# Patient Record
Sex: MALE | Race: WHITE | NOT HISPANIC OR LATINO | Employment: OTHER | URBAN - METROPOLITAN AREA
[De-identification: names, ages, dates, MRNs, and addresses within clinical notes are randomized per-mention and may not be internally consistent; named-entity substitution may affect disease eponyms.]

---

## 2017-10-30 ENCOUNTER — OFFICE VISIT (OUTPATIENT)
Dept: URGENT CARE | Facility: RETAIL CLINIC | Age: 62
End: 2017-10-30
Payer: COMMERCIAL

## 2017-10-30 VITALS
DIASTOLIC BLOOD PRESSURE: 77 MMHG | OXYGEN SATURATION: 97 % | SYSTOLIC BLOOD PRESSURE: 119 MMHG | TEMPERATURE: 97.6 F | HEART RATE: 67 BPM
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
## 2017-10-30 DIAGNOSIS — L08.9 LOCAL SKIN INFECTION: Primary | ICD-10-CM

## 2017-10-30 PROCEDURE — 99203 OFFICE O/P NEW LOW 30 MIN: CPT | Performed by: NURSE PRACTITIONER

## 2017-10-30 NOTE — MR AVS SNAPSHOT
"              After Visit Summary   10/30/2017    Kumar Payne    MRN: [id number]           Patient Information     Date Of Birth          1955        Visit Information        Provider Department      10/30/2017 3:40 PM Willy Brush APRN [facility]        Today's Diagnoses     Local skin infection    -  1    Bug bite, initial encounter           Follow-ups after your visit        Who to contact     You can reach your care team any time of the day by calling [phone].  Notification of test results:  If you have an abnormal lab result, we will notify you by phone as soon as possible.         Additional Information About Your Visit        MyChart Information     [employer] lets you send messages to your doctor, view your test results, renew your prescriptions, schedule appointments and more. To sign up, go to [email or web address] . Click on \"Log in\" on the left side of the screen, which will take you to the Welcome page. Then click on \"Sign up Now\" on the right side of the page.     You will be asked to enter the access code listed below, as well as some personal information. Please follow the directions to create your username and password.     Your access code is: [email or web address]  Expires: 2018  4:29 PM     Your access code will  in 90 days. If you need help or a new code, please call your [place] clinic or [phone].        Care EveryWhere ID     This is your [facility] EveryWhere ID. This could be used by other organizations to access your [place] medical records  [id number]        Your Vitals Were     Pulse Temperature Pulse Oximetry             67 97.6  F (36.4  C) (Oral) 97%          Blood Pressure from Last 3 Encounters:   10/30/17 119/77   16 112/42   14 114/67    Weight from Last 3 Encounters:   16 254 lb 14.4 oz (115.6 kg)   14 241 lb (109.3 kg)   14 247 lb 4.8 oz (112.2 kg)              Today, you had the following     No " orders found for display         Today's Medication Changes          These changes are accurate as of: 10/30/17  4:29 PM.  If you have any questions, ask your nurse or doctor.               Start taking these medicines.        Dose/Directions    cephalexin 250 MG capsule   Commonly known as:  KEFLEX   Used for:  Bug bite, initial encounter, Local skin infection   Started by:  Willy Brush APRN CNP        Dose:  250 mg   Take 1 capsule (250 mg) by mouth 4 times daily for 14 days   Quantity:  56 capsule   Refills:  0            Where to get your medicines      These medications were sent to [address]  [address], [place] [address]     Phone:  [phone]     cephalexin 250 MG capsule                Primary Care Provider Office Phone # Fax #    [facility] [phone]       [address] [facility] [address]        Equal Access to Services     KIRIT MUNOZ : Hadii aad ku hadasho Somitchel, waaxda luqadaha, qaybta kaalmada adeegyada, karley stephens . So [facility] [phone].    ATENCIÓN: Si habla español, tiene a de oliveira disposición servicios gratuitos de asistencia lingüística. Llame al [phone].    We comply with applicable federal civil rights laws and [place] laws. We do not discriminate on the basis of race, color, national origin, age, disability, sex, sexual orientation, or gender identity.            Thank you!     Thank you for choosing [facility]  for your care. Our goal is always to provide you with excellent care. Hearing back from our patients is one way we can continue to improve our services. Please take a few minutes to complete the written survey that you may receive in the mail after your visit with us. Thank you!             Your Updated Medication List - Protect others around you: Learn how to safely use, store and throw away your medicines at www.disposemymeds.org.           This list is accurate as of: 10/30/17  4:29 PM.  Always use your most recent med list.                   Brand Name Dispense Instructions for use Diagnosis    ASPIRIN PO      Unsure of dosage        cephalexin 250 MG capsule    KEFLEX    56 capsule    Take 1 capsule (250 mg) by mouth 4 times daily for 14 days    Bug bite, initial encounter, Local skin infection       divalproex 250 MG 24 hr tablet    DEPAKOTE ER     Take 500 mg by mouth daily.        HYDROcodone-acetaminophen 5-325 MG per tablet    NORCO    20 tablet    Take 1 tablet by mouth every 6 hours as needed for moderate to severe pain    Herpes zoster with ophthalmic complication, unspecified herpes zoster eye disease       IBUPROFEN PO           prednisoLONE acetate 1 % ophthalmic susp    PRED FORTE    1 Bottle    Place 2 drops into the right eye 4 times daily    Herpes zoster with ophthalmic complication, unspecified herpes zoster eye disease       valACYclovir 1000 mg tablet    VALTREX    20 tablet    Take 1 tablet (1,000 mg) by mouth 2 times daily    Herpes zoster with ophthalmic complication, unspecified herpes zoster eye disease

## 2017-10-30 NOTE — NURSING NOTE
"Chief Complaint   Patient presents with     Derm Problem     noticed it yesterday, looks like a bug bite       Initial /77  Pulse 67  Temp 97.6  F (36.4  C) (Oral)  SpO2 97% Estimated body mass index is 35.3 kg/(m^2) as calculated from the following:    Height as of 7/26/16: 5' 11.25\" (1.81 m).    Weight as of 7/26/16: 254 lb 14.4 oz (115.6 kg).  Medication Reconciliation: complete   Chantelle Echeverria      "

## 2017-10-30 NOTE — PROGRESS NOTES
[facility] clinic note    SUBJECTIVE:  Kumar Payne is a 62 year old male who presents to [facility] with chief complaint of a rash.  Onset of rash was 1 day(s) ago.   Rash is sudden onset.  Location of the rash: back.  Quality/symptoms of rash: itching and redness   Symptoms are mild and rash seems to be worsening.  Previous history of a similar rash? No  Recent exposure history: is in the woods a lot & suspects bug bite or tick.    Associated symptoms include: nothing.    Current Outpatient Prescriptions   Medication     cephalexin (KEFLEX) 250 MG capsule     divalproex (DEPAKOTE) 250 MG 24 hr tablet     prednisoLONE acetate (PRED FORTE) 1 % ophthalmic suspension     IBUPROFEN PO     ASPIRIN PO     valACYclovir (VALTREX) 1000 mg tablet     HYDROcodone-acetaminophen (NORCO) 5-325 MG per tablet     No current facility-administered medications for this visit.        PAST MEDICAL HISTORY:   Past Medical History:   Diagnosis Date     Bipolar disorder (H)     on Depakote       PAST SURGICAL HISTORY: No past surgical history on file.    FAMILY HISTORY: No family history on file.    SOCIAL HISTORY:   Social History   Substance Use Topics     Smoking status: Never Smoker     Smokeless tobacco: Never Used     Alcohol use 0.0 oz/week     0 Standard drinks or equivalent per week      Comment: socially         ROS:  Review of systems negative except as stated above.    EXAM:   Vitals:    10/30/17 1617   BP: 119/77   Pulse: 67   Temp: 97.6  F (36.4  C)   TempSrc: Oral   SpO2: 97%     GENERAL: alert, no acute distress.  SKIN: Rash description:    Distribution: localized  Location: back    Color: red,  Lesion type: macular, blotchy with a 3 mm hard scab.  GENERAL APPEARANCE: healthy, alert and no distress  EYES: EOMI,  PERRL, conjunctiva clear  NECK: supple, non-tender to palpation, no adenopathy noted  GENERAL APPEARANCE: healthy, alert and no distress,EYES: EOMI,  PERRL,  conjunctiva clear,NECK: supple, non-tender to palpation, no adenopathy noted,RESP: lungs clear to auscultation - no rales, rhonchi or wheezes,CV: regular rates and rhythm, normal S1 S2, no murmur noted    ASSESSMENT:     Local skin infection  Bug bite, initial encounter      PLAN:  Current Outpatient Prescriptions   Medication     cephalexin (KEFLEX) 250 MG capsule     divalproex (DEPAKOTE) 250 MG 24 hr tablet     prednisoLONE acetate (PRED FORTE) 1 % ophthalmic suspension     IBUPROFEN PO     ASPIRIN PO     valACYclovir (VALTREX) 1000 mg tablet     HYDROcodone-acetaminophen (NORCO) 5-325 MG per tablet     No current facility-administered medications for this visit.        Treatment of pruritus can be difficult and often frustrating. Specific treatments exist for some, but not all.  Antihistamines are the most widely utilized agents for pruritus. (such as Benadryl & Zyrtec).  Appropriate skin care is imperative.  Avoidance of scratching, and therefore secondary skin irritation and perpetuation of the itch-scratch cycle, is also important.    Avoidance of contact irritants such as wool clothing, cleansing agents, and pet dander may be helpful.    Many forms of pruritus can be worsened by dry skin and may improve with treatment for dry skin, including use of a humidifier, maintaining a cool environment, avoiding hot baths/showers, and using only mild soaps.      Lubrication options discussed.    Topical antipruritics such as a camphor-based lotion or oatmeal baths may offer temporary relief.  OTC Treatments were reviewed with Kumar Payne  Patient informed to F/U with PCP if symptoms worsen or do not resolve.    Aveeno baths  Information on the above diagnosis was given to the patient.  Observe for signs of superimposed infection and systemic symptoms  Watch for signs of fever or worsening of the rash.    If not improving Follow up at:  [facility] [phone]    Willy Brush MSN,  APRN, Family NP-C  [facility]

## 2018-08-27 ENCOUNTER — OFFICE VISIT (OUTPATIENT)
Dept: FAMILY MEDICINE | Facility: CLINIC | Age: 63
End: 2018-08-27
Payer: COMMERCIAL

## 2018-08-27 VITALS
TEMPERATURE: 97.3 F | DIASTOLIC BLOOD PRESSURE: 76 MMHG | RESPIRATION RATE: 14 BRPM | HEIGHT: 72 IN | HEART RATE: 85 BPM | OXYGEN SATURATION: 98 % | SYSTOLIC BLOOD PRESSURE: 122 MMHG | BODY MASS INDEX: 33.31 KG/M2 | WEIGHT: 245.9 LBS

## 2018-08-27 DIAGNOSIS — R51.9 SINUS HEADACHE: Primary | ICD-10-CM

## 2018-08-27 PROCEDURE — 99214 OFFICE O/P EST MOD 30 MIN: CPT | Performed by: FAMILY MEDICINE

## 2018-08-27 NOTE — MR AVS SNAPSHOT
"              After Visit Summary   2018    Kumar Payne    MRN: [id number]           Patient Information     Date Of Birth          1955        Visit Information        Provider Department      2018 11:00 AM Sebastián Downey MD [facility]        Today's Diagnoses     Sinus headache    -  1       Follow-ups after your visit        Who to contact     If you have questions or need follow up information about today's clinic visit or your schedule please contact [facility] directly at [phone].  Normal or non-critical lab and imaging results will be communicated to you by MyChart, letter or phone within 4 business days after the clinic has received the results. If you do not hear from us within 7 days, please contact the clinic through [employer] or phone. If you have a critical or abnormal lab result, we will notify you by phone as soon as possible.  Submit refill requests through [employer] or call your pharmacy and they will forward the refill request to us. Please allow 3 business days for your refill to be completed.          Additional Information About Your Visit        MyChart Information     [employer] lets you send messages to your doctor, view your test results, renew your prescriptions, schedule appointments and more. To sign up, go to [email or web address] . Click on \"Log in\" on the left side of the screen, which will take you to the Welcome page. Then click on \"Sign up Now\" on the right side of the page.     You will be asked to enter the access code listed below, as well as some personal information. Please follow the directions to create your username and password.     Your access code is: [email or web address]  Expires: 2018 11:43 AM     Your access code will  in 90 days. If you need help or a new code, please call your [facility] or [phone].        Care EveryWhere ID     This is your Care EveryWhere ID. This could be used by other " "organizations to access your [place] medical records  [id number]        Your Vitals Were     Pulse Temperature Respirations Height Pulse Oximetry BMI (Body Mass Index)    85 97.3  F (36.3  C) (Temporal) 14 6' 0.24\" (1.835 m) 98% 33.13 kg/m2       Blood Pressure from Last 3 Encounters:   08/27/18 122/76   10/30/17 119/77   07/26/16 112/42    Weight from Last 3 Encounters:   08/27/18 245 lb 14.4 oz (111.5 kg)   07/26/16 254 lb 14.4 oz (115.6 kg)   05/19/14 241 lb (109.3 kg)              Today, you had the following     No orders found for display         Today's Medication Changes          These changes are accurate as of 8/27/18 11:43 AM.  If you have any questions, ask your nurse or doctor.               Start taking these medicines.        Dose/Directions    amoxicillin-clavulanate 875-125 MG per tablet   Commonly known as:  AUGMENTIN   Used for:  Sinus headache   Started by:  Sebastián Downey MD        Dose:  1 tablet   Take 1 tablet by mouth 2 times daily   Quantity:  20 tablet   Refills:  0            Where to get your medicines      These medications were sent to [address]  [address] [facility] [address]     Phone:  [phone]     amoxicillin-clavulanate 875-125 MG per tablet                Primary Care Provider Office Phone # Fax #    [facility] [phone]       [address] [facility] [address]        Equal Access to Services     YVON MUNOZ AH: Hadii dalia ku hadasho Soomaali, waaxda luqadaha, qaybta kaalmada adeegyada, waxay christy romero. So [facility] [phone].    ATENCIÓN: Si habla español, tiene a de oliveira disposición servicios gratuitos de asistencia lingüística. Llbrandan al [phone].    We comply with applicable federal civil rights laws and [place] laws. We do not discriminate on the basis of race, color, national origin, age, disability, sex, sexual orientation, or gender identity.            Thank you!  "    Thank you for choosing [facility]  for your care. Our goal is always to provide you with excellent care. Hearing back from our patients is one way we can continue to improve our services. Please take a few minutes to complete the written survey that you may receive in the mail after your visit with us. Thank you!             Your Updated Medication List - Protect others around you: Learn how to safely use, store and throw away your medicines at www.disposemymeds.org.          This list is accurate as of 8/27/18 11:43 AM.  Always use your most recent med list.                   Brand Name Dispense Instructions for use Diagnosis    amoxicillin-clavulanate 875-125 MG per tablet    AUGMENTIN    20 tablet    Take 1 tablet by mouth 2 times daily    Sinus headache       ASPIRIN PO      Unsure of dosage        divalproex sodium extended-release 250 MG 24 hr tablet    DEPAKOTE ER     Take 500 mg by mouth daily.        IBUPROFEN PO

## 2018-08-27 NOTE — PROGRESS NOTES
SUBJECTIVE:   Kumar Payne is a 62 year old male who presents to clinic today for the following health issues:      Headache  Onset: x 2 weeks ago    Description:   Location: unilateral in the left temporal area   Character: feels like a migraine   Frequency:  Couple times a month  Duration:  day    Intensity: 7 /10    Progression of Symptoms:  same    Accompanying Signs & Symptoms:  Stiff neck: YES  Neck or upper back pain: YES  Fever: no  Sinus pressure: YES  Nausea or vomiting: no  Dizziness: no  Numbness: no  Weakness: YES  Visual changes: YES- left more blurry than usual     History:   Head trauma: no  Family history of migraines: YES  Previous tests for headaches: no  Neurologist evaluations: no  Able to do daily activities: YES  Wake with a headaches: YES  Do headaches wake you up: no   Daily pain medication use: no   Work/school stressors/changes: no     Precipitating factors:   Does light make it worse: no  Does sound make it worse: YES    Alleviating factors:  Does sleep help: YES    Therapies Tried and outcome: Ibuprofen (Advil, Motrin) and Tylenol        Problem list and histories reviewed & adjusted, as indicated.  Additional history: as documented        Reviewed and updated as needed this visit by clinical staff       Reviewed and updated as needed this visit by Provider        SUBJECTIVE:  Kumar  is a 62 year old male who presents for: Symptoms as noted above.  History of migraines which are usually couple times a month and he treats with over-the-counter medication successfully.  This headache has been with him for about 2 weeks.  Left side left forehead into the temporal area and some discomfort into his cheek and his teeth on the left as well.  Feels a little congested.  He has had no fever.  No injuries.  His eye has been a bit mattery sometimes in the morning.  And a little blurry but then it clears.  He has had no other neurological complaints.  No ataxia.  Over-the-counter medications do  "dull the headache.    Past Medical History:   Diagnosis Date     Bipolar disorder (H)     on Depakote     History reviewed. No pertinent surgical history.  Social History   Substance Use Topics     Smoking status: Never Smoker     Smokeless tobacco: Never Used     Alcohol use 0.0 oz/week     0 Standard drinks or equivalent per week      Comment: socially     Current Outpatient Prescriptions   Medication Sig Dispense Refill     amoxicillin-clavulanate (AUGMENTIN) 875-125 MG per tablet Take 1 tablet by mouth 2 times daily 20 tablet 0     ASPIRIN PO Unsure of dosage       divalproex (DEPAKOTE) 250 MG 24 hr tablet Take 500 mg by mouth daily.       IBUPROFEN PO          REVIEW OF SYSTEMS:   5 point ROS negative except as noted above in HPI, including Gen., Resp, CV, GI &  system review.     OBJECTIVE:  Vitals: /76  Pulse 85  Temp 97.3  F (36.3  C) (Temporal)  Resp 14  Ht 6' 0.24\" (1.835 m)  Wt 245 lb 14.4 oz (111.5 kg)  SpO2 98%  BMI 33.13 kg/m2  BMI= Body mass index is 33.13 kg/(m^2).  He is alert appears in no distress.  Eyes PERRLA.  EOMs full.  No double vision no nystagmus.  Head is normocephalic.  He is little tender over the temporal area on the left but not in the forehead .  Slightly tender to percussion over the sinuses on the left maxillary area.  Throat is clear.  Neck is supple good range of motion no adenopathy.  Speech is regular gait is regular.  Skin is clear of any rashes.    ASSESSMENT:  Headache  #2sinusitis    PLAN:  Discussed with him a differential here.  High and it would possibly be a sinus infection just exacerbating his migraines.  We will go with an Augmentin dose twice a day and some Mucinex D.  He will also continue on ibuprofen or Tylenol.  We will give it 36-48 hours if not showing some sign of improvement we will proceed with an MRI of the brain.  They are okay with that approach.        Sebastián Downey MD  [facility]            "

## 2020-01-17 ENCOUNTER — OFFICE VISIT (OUTPATIENT)
Dept: FAMILY MEDICINE | Facility: CLINIC | Age: 65
End: 2020-01-17
Payer: COMMERCIAL

## 2020-01-17 VITALS
SYSTOLIC BLOOD PRESSURE: 110 MMHG | HEIGHT: 72 IN | TEMPERATURE: 95 F | DIASTOLIC BLOOD PRESSURE: 68 MMHG | BODY MASS INDEX: 31.15 KG/M2 | WEIGHT: 230 LBS | HEART RATE: 70 BPM | OXYGEN SATURATION: 99 %

## 2020-01-17 DIAGNOSIS — R19.7 DIARRHEA, UNSPECIFIED TYPE: Primary | ICD-10-CM

## 2020-01-17 DIAGNOSIS — Z12.10 ENCOUNTER FOR SCREENING FOR MALIGNANT NEOPLASM OF INTESTINAL TRACT, UNSPECIFIED: ICD-10-CM

## 2020-01-17 DIAGNOSIS — R19.7 DIARRHEA, UNSPECIFIED TYPE: ICD-10-CM

## 2020-01-17 LAB
C DIFF TOX B STL QL: NEGATIVE
CRP SERPL-MCNC: 3.1 MG/L (ref 0–8)
ERYTHROCYTE [DISTWIDTH] IN BLOOD BY AUTOMATED COUNT: 12.5 % (ref 10–15)
HCT VFR BLD AUTO: 43.3 % (ref 40–53)
HGB BLD-MCNC: 14.8 G/DL (ref 13.3–17.7)
MCH RBC QN AUTO: 30 PG (ref 26.5–33)
MCHC RBC AUTO-ENTMCNC: 34.2 G/DL (ref 31.5–36.5)
MCV RBC AUTO: 88 FL (ref 78–100)
PLATELET # BLD AUTO: 262 10E9/L (ref 150–450)
RBC # BLD AUTO: 4.94 10E12/L (ref 4.4–5.9)
SPECIMEN SOURCE: NORMAL
WBC # BLD AUTO: 9 10E9/L (ref 4–11)

## 2020-01-17 PROCEDURE — 87177 OVA AND PARASITES SMEARS: CPT | Performed by: NURSE PRACTITIONER

## 2020-01-17 PROCEDURE — 36415 COLL VENOUS BLD VENIPUNCTURE: CPT | Performed by: NURSE PRACTITIONER

## 2020-01-17 PROCEDURE — 87209 SMEAR COMPLEX STAIN: CPT | Performed by: NURSE PRACTITIONER

## 2020-01-17 PROCEDURE — 87493 C DIFF AMPLIFIED PROBE: CPT | Performed by: NURSE PRACTITIONER

## 2020-01-17 PROCEDURE — 87506 IADNA-DNA/RNA PROBE TQ 6-11: CPT | Performed by: NURSE PRACTITIONER

## 2020-01-17 PROCEDURE — 86140 C-REACTIVE PROTEIN: CPT | Performed by: NURSE PRACTITIONER

## 2020-01-17 PROCEDURE — 85027 COMPLETE CBC AUTOMATED: CPT | Performed by: NURSE PRACTITIONER

## 2020-01-17 PROCEDURE — 99213 OFFICE O/P EST LOW 20 MIN: CPT | Performed by: NURSE PRACTITIONER

## 2020-01-17 ASSESSMENT — MIFFLIN-ST. JEOR: SCORE: 1861.27

## 2020-01-17 ASSESSMENT — PAIN SCALES - GENERAL: PAINLEVEL: MILD PAIN (2)

## 2020-01-17 NOTE — PROGRESS NOTES
Subjective     Kumar Payne is a 64 year old male who presents to clinic today for the following health issues:    Patient presents today for diarrhea that has been on going for the last 2 weeks. He is having 2-3 loose stools daily. No painful cramping, no hematochezia, no mucous or foul smelling diarrhea.  He is negative for fevers chills or body aches.  He does eat out at fast food restaurants occasionally and has in the last couple weeks.  He has not been out of the country or been exposed to anybody with any illness.  There is no abdominal pain bloating nausea or vomiting.  He has tried Imodium taken up to 4 tablets a day did not improve the diarrhea.  He did start eating bananas, and rice to help thicken his stool which has helped.  Energy is good he is eating and drinking well.  He has lost 10 pounds but still able to do his normal daily activities to include work.    HPI   Diarrhea  Onset: 3 weeks     Description:   Consistency of stool: watery and loose  Blood in stool: YES- little but on toilet paper  Number of loose stools in past 24 hours: 3    Progression of Symptoms:  same    Accompanying Signs & Symptoms:  Fever: no   Nausea or vomiting; no   Abdominal pain: YES  Episodes of constipation: YES- prior   Weight loss: YES    History:   Ill contacts: no   Recent use of antibiotics: no    Recent travels: no          Recent medication-new or changes(Rx or OTC): YES- Melatonin     Precipitating factors:   None     Alleviating factors:   None     Therapies Tried and outcome:  banana with rice; Outcome: no help      Patient Active Problem List   Diagnosis     Bipolar disorder (H)     No past surgical history on file.    Social History     Tobacco Use     Smoking status: Never Smoker     Smokeless tobacco: Never Used   Substance Use Topics     Alcohol use: Yes     Alcohol/week: 0.0 standard drinks     Comment: socially     No family history on file.      Current Outpatient Medications   Medication Sig Dispense  Refill     divalproex (DEPAKOTE) 250 MG 24 hr tablet Take 500 mg by mouth daily.       amoxicillin-clavulanate (AUGMENTIN) 875-125 MG per tablet Take 1 tablet by mouth 2 times daily (Patient not taking: Reported on 1/17/2020) 20 tablet 0     ASPIRIN PO Unsure of dosage       IBUPROFEN PO        No Known Allergies      Reviewed and updated as needed this visit by Provider         Review of Systems   ROS COMP: Constitutional, HEENT, cardiovascular, pulmonary, gi and gu systems are negative, except as otherwise noted.      Objective    /68   Pulse 70   Temp 95  F (35  C) (Temporal)   Ht 1.829 m (6')   Wt 104.3 kg (230 lb)   SpO2 99%   BMI 31.19 kg/m    Body mass index is 31.19 kg/m .  Physical Exam    GENERAL: healthy, alert and no distress  EYES: Eyes grossly normal to inspection  NECK: no adenopathy, no asymmetry, masses, or scars and thyroid normal to palpation  RESP: lungs clear to auscultation - no rales, rhonchi or wheezes  CV: regular rates and rhythm, normal S1 S2, no S3 or S4, no murmur, click or rub and no peripheral edema  ABDOMEN: soft, nontender and with hyperactive bowel sounds  MS: no gross musculoskeletal defects noted, no edema  SKIN: no suspicious lesions or rashes  NEURO: Normal strength and tone, mentation intact and speech normal  BACK: no CVA tenderness, no paralumbar tenderness    Diagnostic Test Results:  Labs reviewed in Epic        Assessment & Plan     1. Diarrhea, unspecified type  -Ongoing diarrhea for over 2 weeks he does not look infected, no fevers, no chills no body aches of any kind he continues to eat and drink with his normal diet.  He has switched to a bland diet however which is been helpful.  -Would hold off on instructing him to take any loperamide or drugs to decrease the motility of the bowels until I make sure there is no infective or parasitic infection in the bowels.  I do get a CBC to make sure his hemoglobin is not dropping we will get a CRP as well  -Been over  10 years since his last colonoscopy had some blood on the toilet paper most likely related to fissures or hemorrhoids secondary to the 2 weeks of ongoing diarrhea but I will try and get his occult stool sample done as well.  -He is willing to be set up for another colonoscopy but at this point with the diarrhea we need to wait till he get the diarrhea resolved.  - Clostridium difficile Toxin B PCR; Future  - Enteric Bacteria and Virus Panel by [facility] Stool; Future  - Ova and Parasite Exam Routine; Future  - CBC with platelets  - CRP inflammation    -Patient will be called with all lab results and will be notified of any further follow-up or change in plan of care is depending upon results.    -Patient was instructed to present to the ER with any worsening symptoms to include high fever, bright red blood in his stool, intractable diarrhea with 10-12 stools a day, or any severe weakness or fatigue.    -Patient verbalized understanding of plan of care and is in agreement with current plan of care.      Return if symptoms worsen or fail to improve.    Sergo Camp NP  [facility]

## 2020-01-17 NOTE — PATIENT INSTRUCTIONS
Patient Education     Treating Diarrhea    Diarrhea happens when you have loose, watery, or frequent bowel movements. It is a common problem with many causes. Most cases of diarrhea clear up on their own. But certain cases may need treatment. Be sure to see your healthcare provider if your symptoms do not improve within a few days.  Getting relief  Treatment of diarrhea depends on its cause. Diarrhea caused by bacterial or parasite infection is often treated with antibiotics. Diarrhea caused by other factors, such as a stomach virus, often improves with simple home treatment. The tips below may also help relieve your symptoms.    Drink plenty of fluids. This helps prevent too much fluid loss (dehydration). Water, clear soups, and electrolyte solutions are good choices. Avoid alcohol, coffee, tea, and milk. These can irritate your intestines and make symptoms worse.    Suck on ice chips if drinking makes you queasy.    Return to your normal diet slowly. You may want to eat bland foods at first, such as rice and toast. Also, you may need to avoid certain foods for a while, such as dairy products. These can make symptoms worse. Ask your healthcare provider if there are any other foods you should avoid.    If you were prescribed antibiotics, take them as directed.    Do not take anti-diarrhea medicines without asking your healthcare provider first.  Call your healthcare provider   Call your healthcare provider if you have any of the following:     A fever of 100.4 F (38.0 C) or higher, or as directed by your healthcare provider    Severe pain    Worsening diarrhea or diarrhea for more than 2 days    Bloody vomit or stool    Signs of dehydration (dizziness, dry mouth and tongue, rapid pulse, dark urine)  Date Last Reviewed: 7/1/2016 2000-2019 [employer]. [address] [place] [address], [place] [address]. All rights reserved. This information is not intended as a substitute for professional medical care.  Always follow your healthcare professional's instructions.           Patient Education     Low-Fiber Diet     Eggs are high in protein and easy to digest.   Eating a low-fiber diet means eating foods that don t have much fiber. These foods are easy to digest.  Most of the fiber that you eat passes undigested through your bowel. This is what forms stool. Low-fiber foods can help to slow down your bowel movements. When you eat a low-fiber diet, you have fewer stools. This lets your intestine rest.  Your healthcare provider will tell you how long you need to be on this diet. It may only be for a short time. Low-fiber foods often don t give you all the nutrients you need to stay healthy. Your healthcare provider may have you take certain vitamins while you are on this diet.  Reasons to eat a low-fiber diet  The goal of a low-fiber diet is to limit the size and number of your stools. It may be prescribed if you:    Are going through chemotherapy or radiation treatments    Have had intestinal surgery    Have a condition that affects your intestine, such as irritable bowel syndrome, Crohn s disease, ulcerative colitis, or diverticulitis  General guidelines for a low-fiber diet  In general, a low-fiber diet means having fewer than 13 grams of fiber a day. Your healthcare provider may give you a list of things you can and can t eat or drink. Read food labels. Choose foods and drinks that have as close to zero grams of fiber as possible. Here are general guidelines to follow:  Breads, pasta, cereal, rice, and other starches (6 to 11 servings daily)    What to choose: white bread, biscuits, muffins, and white rolls; plain crackers; waffles; white pasta; white rice; cream of wheat; grits; white pancakes; corn flakes; cooked potatoes without skin. Fiber content of these foods should be less than 0.5 (1/2) gram per serving.    What to avoid: whole-wheat or whole-grain breads, crackers, and pasta; breads with seeds or nuts; wheat  germ; fernanad crackers; cornbread; wild or brown rice; cereals with whole-grain, bran, and granola; cereals with seeds, nuts, coconut, or dried fruit; potatoes with skin  Milk and dairy (2 servings daily)    What to choose: milk, buttermilk; yogurt or ice cream without seeds or nuts; custard or pudding; sour cream; cheese and cottage cheese    What to avoid: ice cream and yogurt with seeds, nuts, or fruit chunks  Fruit (2 to 4 servings daily)    What to choose: ripe banana; ripe nectarine, peach, apricot, papaya, and plum; soft honeydew melon and cantaloupe; cooked or canned fruit without skin or seeds (not sweetened with sorbitol); applesauce; strained fruit juice (without pulp)    What to avoid: raw or dried fruit; all berries; raisins; canned and raw pineapple; prunes and prune juice; fruit juice with pulp  Vegetables (3 to 5 servings daily)    What to choose: well-cooked or canned vegetables without seeds, such as spinach, eggplant, green and wax beans, carrots, yellow squash, pumpkin; lettuce on a sandwich    What to avoid: all raw or steamed vegetables; vegetables with seeds, such as unstrained tomato sauce; green peas; lima beans; broccoli; corn; parsnips  Meats and protein (4 to 6 ounces daily)    What to choose: tender, well-cooked meat, including ground meat, poultry, and fish; eggs; tofu; creamy peanut butter    What to avoid: tough, chewy meat with gristle; peas, including split, yellow, and black-eyed; beans, including navy, lima, black, garbanzo, soy, marina, and lentil; peanuts and crunchy peanut butter   Fats, oils, sauces, condiments (fewer than 8 teaspoons daily)    What to choose: butter, margarine, oils, whipped cream, sour cream, mayonnaise, smooth dressings and sauces; plain gravy; smooth condiments    What to avoid: dressing with seeds or fruit chunks; pickles and relishes  Other foods and drinks    What to choose: water; plain gelatin; plain puddings; pretzels; plain cookies and cakes; honey,  syrup; decaffeinated drinks, including tea and coffee      What to avoid: popcorn; potato chips; spicy foods; fried, greasy foods; alcohol (ask your healthcare provider); marmalade, jam, and preserves; desserts that have seeds, nuts, coconut, dried fruit, whole grains, or bran; candy that has seeds or nuts; drinks sweetened with sorbitol or other sugar substitutes; caffeinated drinks, including tea, coffee, soda, and energy drinks    Date Last Reviewed: 6/1/2017 2000-2019 [employer]. [address] [place] [address]. All rights reserved. This information is not intended as a substitute for professional medical care. Always follow your healthcare professional's instructions.    Drink lots of fluid to include 1-2 Gatorade a day.     Hold off on the imodium for now.    I will call you Monday with lab results. We will determine any treatment necessary or further follow up required.   Present to ER with any blood in the stool, fever over 100.4, more than 10-12 stools a day, or significant pain or fatigue.     Sergo Camp CNP

## 2020-01-17 NOTE — LETTER
January 17, 2020      Kumar Payne  [address] [place] [id number]        Dear ,    We are writing to inform you of your test results.    Please ensure that the blood work we completed today was completely normal.  No signs of inflammation, white count which would be elevated for significant infection is within normal range, and hemoglobin is normal supporting that there is no significant bleeding.  Let patient know will notify him when we get the stool screening and testing back.     Resulted Orders   CBC with platelets   Result Value Ref Range    WBC 9.0 4.0 - 11.0 10e9/L    RBC Count 4.94 4.4 - 5.9 10e12/L    Hemoglobin 14.8 13.3 - 17.7 g/dL    Hematocrit 43.3 40.0 - 53.0 %    MCV 88 78 - 100 fl    MCH 30.0 26.5 - 33.0 pg    MCHC 34.2 31.5 - 36.5 g/dL    RDW 12.5 10.0 - 15.0 %    Platelet Count 262 150 - 450 10e9/L   CRP inflammation   Result Value Ref Range    CRP Inflammation 3.1 0.0 - 8.0 mg/L       If you have any questions or concerns, please call the clinic at the number listed above.       Sincerely,        Sergo Camp NP

## 2020-01-18 LAB
C COLI+JEJUNI+LARI FUSA STL QL NAA+PROBE: ABNORMAL
EC STX1 GENE STL QL NAA+PROBE: ABNORMAL
EC STX2 GENE STL QL NAA+PROBE: ABNORMAL
ENTERIC PATHOGEN COMMENT: ABNORMAL
NOROV GI+II ORF1-ORF2 JNC STL QL NAA+PR: ABNORMAL
RVA NSP5 STL QL NAA+PROBE: ABNORMAL
SALMONELLA SP RPOD STL QL NAA+PROBE: ABNORMAL
SHIGELLA SP+EIEC IPAH STL QL NAA+PROBE: ABNORMAL
V CHOL+PARA RFBL+TRKH+TNAA STL QL NAA+PR: ABNORMAL
Y ENTERO RECN STL QL NAA+PROBE: ABNORMAL

## 2020-01-20 LAB
O+P STL MICRO: NORMAL
SPECIMEN SOURCE: NORMAL

## 2020-01-21 ENCOUNTER — TELEPHONE (OUTPATIENT)
Dept: FAMILY MEDICINE | Facility: CLINIC | Age: 65
End: 2020-01-21

## 2020-01-21 DIAGNOSIS — R19.7 DIARRHEA, UNSPECIFIED TYPE: Primary | ICD-10-CM

## 2020-01-21 PROCEDURE — 82272 OCCULT BLD FECES 1-3 TESTS: CPT | Performed by: NURSE PRACTITIONER

## 2020-01-21 NOTE — TELEPHONE ENCOUNTER
Tried to reach patient, left message for patient to call the clinic back.  Patient can  test at the lab.     Tavo Coyne CMA

## 2020-01-21 NOTE — TELEPHONE ENCOUNTER
Tried to reach patient, left message for patient to call the clinic back.    Tavo Coyne, [facility]

## 2020-01-21 NOTE — TELEPHONE ENCOUNTER
Patient called back. Is still feeling systematic. Does he just come back in for the lab work? Please call patient back.

## 2020-01-21 NOTE — TELEPHONE ENCOUNTER
----- Message from Sergo Camp NP sent at 1/21/2020  7:03 AM CST -----  Please call with results. Please ensure that the parasite evaluation was negative. However the Bacteria and Virus panel was not interpretable, meaning they need a new sample. If he remains systematic please have him complete a new stool stample and repeat the Bacteria virus panel. Dx: Diarrhea.    Thank you,      Sergo Camp NP on 1/21/2020 at 7:01 AM

## 2020-01-22 DIAGNOSIS — R19.7 DIARRHEA, UNSPECIFIED TYPE: Primary | ICD-10-CM

## 2020-01-22 DIAGNOSIS — Z12.10 ENCOUNTER FOR SCREENING FOR MALIGNANT NEOPLASM OF INTESTINAL TRACT, UNSPECIFIED: ICD-10-CM

## 2020-01-22 LAB — HEMOCCULT STL QL: NEGATIVE

## 2020-01-22 NOTE — TELEPHONE ENCOUNTER
Tried to reach patient, left message for patient to call the clinic back.    Patient can  test from the lab.  Tavo Coyne CMA

## 2020-01-23 DIAGNOSIS — R19.7 DIARRHEA, UNSPECIFIED TYPE: ICD-10-CM

## 2020-01-23 PROCEDURE — 87506 IADNA-DNA/RNA PROBE TQ 6-11: CPT | Performed by: NURSE PRACTITIONER

## 2020-01-27 DIAGNOSIS — R19.7 DIARRHEA, UNSPECIFIED TYPE: ICD-10-CM

## 2020-01-27 DIAGNOSIS — Z12.10 ENCOUNTER FOR SCREENING FOR MALIGNANT NEOPLASM OF INTESTINAL TRACT, UNSPECIFIED: ICD-10-CM

## 2020-01-27 LAB
COLLECT DATE STL: NORMAL
HEMOCCULT SP1 STL QL: NEGATIVE

## 2020-01-27 PROCEDURE — 82270 OCCULT BLOOD FECES: CPT | Performed by: NURSE PRACTITIONER

## 2020-02-20 NOTE — PROGRESS NOTES
Subjective     Kumar Payne is a 64 year old male who presents to clinic today for the following health issues:    HPI   Diarrhea  Onset: After Matheus, resolved for a week and has been back for about 2 weeks    Description:   Consistency of stool: watery and loose  Blood in stool: no   Number of loose stools in past 24 hours: 2    Progression of Symptoms:  same    Accompanying Signs & Symptoms:  Fever: no   Nausea or vomiting; no   Abdominal pain: no   Episodes of constipation: YES- Prior to diarrhea starting in December  Weight loss: YES- Patient reports 10lbs    History:   Ill contacts: no   Recent use of antibiotics: no    Recent travels: no          Recent medication-new or changes(Rx or OTC): no     Precipitating factors:   Drinking milk    Alleviating factors:   none    Therapies Tried and outcome:  Imodium AD; Outcome: no immprovement    Patient Active Problem List   Diagnosis     Bipolar disorder (H)     Hyperlipidemia LDL goal <100     History reviewed. No pertinent surgical history.    Social History     Tobacco Use     Smoking status: Never Smoker     Smokeless tobacco: Never Used   Substance Use Topics     Alcohol use: Yes     Alcohol/week: 0.0 standard drinks     Comment: socially     History reviewed. No pertinent family history.      Current Outpatient Medications   Medication Sig Dispense Refill     ASPIRIN PO Unsure of dosage       divalproex (DEPAKOTE) 250 MG 24 hr tablet Take 500 mg by mouth daily.       IBUPROFEN PO        No Known Allergies  Recent Labs   Lab Test 03/12/13  1820   CR 0.88   GFRESTIMATED 89   GFRESTBLACK >90   POTASSIUM 3.9   TSH 1.42      BP Readings from Last 3 Encounters:   02/21/20 114/68   01/17/20 110/68   08/27/18 122/76    Wt Readings from Last 3 Encounters:   02/21/20 104.1 kg (229 lb 6.4 oz)   01/17/20 104.3 kg (230 lb)   08/27/18 111.5 kg (245 lb 14.4 oz)                    Reviewed and updated as needed this visit by Provider         Review of Systems   ROS COMP:  Constitutional, HEENT, cardiovascular, pulmonary, GI, , musculoskeletal, neuro, skin, endocrine and psych systems are negative, except as otherwise noted.      Objective    /68   Pulse 80   Temp 99.4  F (37.4  C) (Temporal)   Resp 16   Ht 1.829 m (6')   Wt 104.1 kg (229 lb 6.4 oz)   SpO2 99%   BMI 31.11 kg/m    Body mass index is 31.11 kg/m .  Physical Exam   GENERAL: healthy, alert and no distress  HENT: normal cephalic/atraumatic, ear canals and TM's normal, nose and mouth without ulcers or lesions, rhinorrhea clear, oropharynx clear, oral mucous membranes moist, tonsillar hypertrophy and tonsillar erythema  NECK: no adenopathy, no asymmetry, masses, or scars and trachea midline and normal to palpation  RESP: lungs clear to auscultation - no rales, rhonchi or wheezes  CV: regular rates and rhythm, normal S1 S2, no S3 or S4, grade 2/6 holosystolic murmur heard best over the left sternal border at approximately ICM 3, peripheral pulses strong and no peripheral edema  ABDOMEN: soft, nontender, no hepatosplenomegaly, no masses and bowel sounds normal  MS: no gross musculoskeletal defects noted, no edema  SKIN: no suspicious lesions or rashes to visible skin todayDiagnostic Test Results:  Labs reviewed in Epic  NEURO: Normal strength and tone, mentation intact and speech normal  PSYCH: mentation appears normal, affect normal/bright    Diagnostic Test Results:  Labs reviewed in Epic  No results found for this or any previous visit (from the past 24 hour(s)).        Assessment & Plan     1. Throat pain  Negative strep screen today we will call in antibiotics if it becomes positive on the culture.  Over-the-counter treatments encourage plenty of fluids and rest as well.  - Streptococcus A Rapid Scr w Reflx to PCR    2. Diarrhea, unspecified type  Uncertain etiology of this but one test that I can think of that may indeed be causing him some diarrhea is Giardia infection.  We will have him complete this ASAP  and follow-up with GI for colonoscopy and consult regarding what I would consider more of a chronic diarrhea.  We do discuss the fact that with a recent dental extraction there may be an underlying infection giving him his DR diarrhea from oral cavity as well.  - GASTROENTEROLOGY ADULT REF CONSULT ONLY  - Colonoscopy [[id number]]; Future  - Cryptosporidium/Giardia Immunoassay; Future  - Echocardiogram Complete; Future    3. Undiagnosed cardiac murmurs  Uncertain etiology of this at this point time I suspect more of a mitral valve insufficiency versus stress of being sick increasing cardiovascular output and thereby causing the valve to vibrate that we are hearing today.  I certainly think about oral cavity infectious process causing vegetation on heart valve and thereby causing the murmur as well.  - GASTROENTEROLOGY ADULT REF CONSULT ONLY  - Cryptosporidium/Giardia Immunoassay; Future  - Echocardiogram Complete; Future     BMI:   Estimated body mass index is 31.11 kg/m  as calculated from the following:    Height as of this encounter: 1.829 m (6').    Weight as of this encounter: 104.1 kg (229 lb 6.4 oz).   Weight management plan: Patient was referred to their PCP to discuss a diet and exercise plan.        Work on weight loss  Regular exercise  Return in about 2 weeks (around 3/6/2020) for recheck of current condition, if symptoms do not improve.    Noah Michaud PA-C  [facility]

## 2020-02-21 ENCOUNTER — OFFICE VISIT (OUTPATIENT)
Dept: FAMILY MEDICINE | Facility: OTHER | Age: 65
End: 2020-02-21
Payer: COMMERCIAL

## 2020-02-21 VITALS
OXYGEN SATURATION: 99 % | WEIGHT: 229.4 LBS | TEMPERATURE: 99.4 F | RESPIRATION RATE: 16 BRPM | HEIGHT: 72 IN | BODY MASS INDEX: 31.07 KG/M2 | HEART RATE: 80 BPM | SYSTOLIC BLOOD PRESSURE: 114 MMHG | DIASTOLIC BLOOD PRESSURE: 68 MMHG

## 2020-02-21 DIAGNOSIS — R07.0 THROAT PAIN: Primary | ICD-10-CM

## 2020-02-21 DIAGNOSIS — R01.1 UNDIAGNOSED CARDIAC MURMURS: ICD-10-CM

## 2020-02-21 DIAGNOSIS — R19.7 DIARRHEA, UNSPECIFIED TYPE: ICD-10-CM

## 2020-02-21 PROBLEM — E78.5 HYPERLIPIDEMIA LDL GOAL <100: Status: ACTIVE | Noted: 2020-02-21

## 2020-02-21 LAB
DEPRECATED S PYO AG THROAT QL EIA: NEGATIVE
SPECIMEN SOURCE: NORMAL
SPECIMEN SOURCE: NORMAL
STREP GROUP A PCR: NOT DETECTED

## 2020-02-21 PROCEDURE — 87329 GIARDIA AG IA: CPT | Performed by: PHYSICIAN ASSISTANT

## 2020-02-21 PROCEDURE — 87328 CRYPTOSPORIDIUM AG IA: CPT | Performed by: PHYSICIAN ASSISTANT

## 2020-02-21 PROCEDURE — 99214 OFFICE O/P EST MOD 30 MIN: CPT | Performed by: PHYSICIAN ASSISTANT

## 2020-02-21 PROCEDURE — 36415 COLL VENOUS BLD VENIPUNCTURE: CPT | Performed by: PHYSICIAN ASSISTANT

## 2020-02-21 PROCEDURE — 87651 STREP A DNA AMP PROBE: CPT | Performed by: PHYSICIAN ASSISTANT

## 2020-02-21 PROCEDURE — 40001204 ZZHCL STATISTIC STREP A RAPID: Performed by: PHYSICIAN ASSISTANT

## 2020-02-21 ASSESSMENT — MIFFLIN-ST. JEOR: SCORE: 1858.55

## 2020-02-21 ASSESSMENT — PAIN SCALES - GENERAL: PAINLEVEL: SEVERE PAIN (7)

## 2020-02-24 ENCOUNTER — TELEPHONE (OUTPATIENT)
Dept: FAMILY MEDICINE | Facility: OTHER | Age: 65
End: 2020-02-24

## 2020-02-24 LAB
C PARVUM AG STL QL IA: NEGATIVE
G LAMBLIA AG STL QL IA: NEGATIVE
SPECIMEN SOURCE: NORMAL

## 2020-02-25 ENCOUNTER — TELEPHONE (OUTPATIENT)
Dept: FAMILY MEDICINE | Facility: OTHER | Age: 65
End: 2020-02-25

## 2020-02-25 ENCOUNTER — HOSPITAL ENCOUNTER (OUTPATIENT)
Dept: CARDIOLOGY | Facility: CLINIC | Age: 65
Discharge: HOME | End: 2020-02-25
Attending: PHYSICIAN ASSISTANT | Admitting: PHYSICIAN ASSISTANT
Payer: COMMERCIAL

## 2020-02-25 DIAGNOSIS — I38 HEART VALVE DISEASE: Primary | ICD-10-CM

## 2020-02-25 DIAGNOSIS — R19.7 DIARRHEA, UNSPECIFIED TYPE: ICD-10-CM

## 2020-02-25 DIAGNOSIS — R01.1 UNDIAGNOSED CARDIAC MURMURS: ICD-10-CM

## 2020-02-25 PROCEDURE — 93306 TTE W/DOPPLER COMPLETE: CPT

## 2020-02-25 PROCEDURE — 93306 TTE W/DOPPLER COMPLETE: CPT | Mod: 26 | Performed by: INTERNAL MEDICINE

## 2020-02-25 NOTE — TELEPHONE ENCOUNTER
With C2C patients wife was given the message below. Transferred to speciality for scheduling.   Jennifer Alexander [facility] ([facility])    Notes recorded by Naoh Rangel PA-C on 2/25/2020 at 12:56 PM CST  I will place a referral for the patient to see cardiology.  I would like them to review his echocardiogram with him and formulate a plan for going forward.  It would appear that he has some valvular abnormalities that need to be further evaluated prior to giving better advice.  Electronically signed:    Noah Rangel PA-C

## 2020-02-26 ENCOUNTER — APPOINTMENT (OUTPATIENT)
Dept: LAB | Facility: CLINIC | Age: 65
End: 2020-02-26
Payer: COMMERCIAL

## 2020-02-26 ENCOUNTER — OFFICE VISIT (OUTPATIENT)
Dept: CARDIOLOGY | Facility: CLINIC | Age: 65
End: 2020-02-26
Payer: COMMERCIAL

## 2020-02-26 ENCOUNTER — HOSPITAL ENCOUNTER (OUTPATIENT)
Dept: CARDIOLOGY | Facility: CLINIC | Age: 65
Discharge: HOME | End: 2020-02-26
Admitting: INTERNAL MEDICINE
Payer: COMMERCIAL

## 2020-02-26 VITALS
WEIGHT: 229.9 LBS | HEIGHT: 72 IN | HEART RATE: 60 BPM | DIASTOLIC BLOOD PRESSURE: 76 MMHG | BODY MASS INDEX: 31.14 KG/M2 | SYSTOLIC BLOOD PRESSURE: 116 MMHG | OXYGEN SATURATION: 96 %

## 2020-02-26 DIAGNOSIS — I38 HEART VALVE DISEASE: ICD-10-CM

## 2020-02-26 LAB — CRP SERPL-MCNC: 7.7 MG/L (ref 0–8)

## 2020-02-26 PROCEDURE — 93010 ELECTROCARDIOGRAM REPORT: CPT | Performed by: INTERNAL MEDICINE

## 2020-02-26 PROCEDURE — 86140 C-REACTIVE PROTEIN: CPT | Performed by: INTERNAL MEDICINE

## 2020-02-26 PROCEDURE — 99205 OFFICE O/P NEW HI 60 MIN: CPT | Performed by: INTERNAL MEDICINE

## 2020-02-26 PROCEDURE — 36415 COLL VENOUS BLD VENIPUNCTURE: CPT | Performed by: INTERNAL MEDICINE

## 2020-02-26 PROCEDURE — 93005 ELECTROCARDIOGRAM TRACING: CPT | Performed by: REHABILITATION PRACTITIONER

## 2020-02-26 PROCEDURE — 87040 BLOOD CULTURE FOR BACTERIA: CPT | Performed by: INTERNAL MEDICINE

## 2020-02-26 ASSESSMENT — MIFFLIN-ST. JEOR: SCORE: 1860.82

## 2020-02-26 NOTE — LETTER
2/26/2020    Physician No Ref-Primary  No address on file    RE: Kumar Payne       Dear Colleague,    I had the pleasure of seeing Kumar Payne in the [facility].    CARDIOLOGY CONSULT    REASON FOR CONSULT: Mitral regurgitation    PRIMARY CARE PHYSICIAN:  Physician No Ref-Primary    HISTORY OF PRESENT ILLNESS: Mr. Payne is a pleasant 64-year-old gentleman with past medical history significant for a bipolar disorder who presents for the evaluation of mitral regurgitation noted on recent echocardiogram obtained for new heart murmur.    K[facility] and [facility].  Starting about 5 months ago, he noted significant tooth pain.  Around Matheus time, he began developing diffuse watery diarrhea.  He had frequent episodes throughout the day.  He denied any other infectious  symptoms including fevers chills nausea vomiting or abdominal pain.  There was no blood in his stools.  He was eating and drinking per usual.  The diarrhea resolved for about a week, however Kumar states that that has subsequently returned.  He is going to be seen by GI however not until March.  Kumar was also seen by the dentist last week and the tooth pain he had been experiencing and was in fact an infected tooth.  This was subsequently pulled.  Following his dental work, Kumar states that he developed a cold with upper respiratory symptoms including congestion and cough and this has persisted, although improving.  Kumar otherwise reports  Feeling well.  He is active at his job as a .  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  He denies any exertional chest pain, chest discomfort or shortness of breath.  He denies any orthopnea, PND or lower extremity edema.  He denies any heart palpitations or racing heart.  He is otherwise without cardiovascular complaints.  When he was seen by his primary care physician for diarrhea, heart murmur was noted.  An  echocardiogram was obtained which demonstrated thickened mitral valve leaflets with very eccentric mitral regurgitation at least moderate if not more in severity.      PAST MEDICAL HISTORY:  1.  Bipolar disorder: On Depakote      MEDICATIONS:  Current Outpatient Medications   Medication     divalproex (DEPAKOTE) 250 MG 24 hr tablet     ASPIRIN PO     IBUPROFEN PO     No current facility-administered medications for this visit.        ALLERGIES:  No Known Allergies    SOCIAL HISTORY:  Still works as a .  He is a non-smoker.  No significant alcohol.    FAMILY HISTORY:  I have reviewed this patient's family history and updated it with pertinent information if needed.   No family history on file.    REVIEW OF SYSTEMS:  A complete ROS was obtained and the pertinent positives are outlined in the history of present illness above.  The remainder of systems is negative.    PHYSICAL EXAM:      BP: 116/76 Pulse: 60     SpO2: 96 %      Vital Signs with Ranges  Pulse:  [60] 60  BP: (116)/(76) 116/76  SpO2:  [96 %] 96 %  229 lbs 14.4 oz    Constitutional: awake, alert, no distress  Eyes: PERRL, sclera nonicteric  ENT: trachea midline  Respiratory: CTAB  Cardiovascular: RRR, II/VI holosystolic murmur heard best at the base  GI: nondistended, nontender, bowel sounds present  Lymph/Hematologic: no lymphadenopathy  Skin: dry, no rash  Musculoskeletal: good muscle tone, no edema bilaterally  Neurologic: no focal deficits  Neuropsychiatric: appropriate affact    DATA:  Labs:   Reviewed in EPIC    EKG:  Dated 2/26/2020 reviewed personally.  Normal sinus rhythm without ST segment changes    TTE:  The mitral valve leaflets are mildly thickened.  Probable prolapse of the middle scallop of the posterior mitral leaflet.  The mitral regurgitant jet is eccentrically directed.  Evaluation of regurgitation is inadequate.  There is at least moderate to mod-severe (2-3+) mitral regurgitation.  Consider PHILLIP for further evaluation if  clinically appropriate. There is no  comparison study available.    ASSESSMENT:  1.  Mitral regurgitation: I reviewed the transthoracic images personally.  He has normal left ventricular size and function.  The mitral valve leaflet appears thickened, possibly myxomatous.  There is very eccentric mitral regurgitation that is difficult to quantify, at least moderate in severity.  No evidence evidence for vegetation identified.  He is not symptomatic related to valve disease.    2.  Diarrheal illness occurring since December: Infectio he does not demonstrate us work-up to date has been negative.  He is scheduled to follow-up with GI for further evaluation.  3.  Recent tooth infection: Status post tooth extraction.  At high risk for bacteremia and seeding of abnormal mitral valve.  However, no clear evidence for infectious symptoms suggesting endocarditis.    RECOMMENDATIONS:  1. Reviewed the pathophysiology of mitral regurgitation, mitral valve disease and indications for intervention.  Transthoracic imaging is somewhat difficult and the severity of MR is difficult to assess.  He has had a several month history of tooth pain along with more recent diarrheal illness and we will need to make sure that bacteremia and vegetation is not .  This was discussed with Kumar and he is agreeable to the following:  -CRP, blood cultures today  -Schedule a PHILLIP for further evaluation of the mitral valve anatomy and severity of regurgitation  -Follow-up with me after PHILLIP to discuss results.    Sonia Kim MD  Cardiology - [facility]  Pager:  [phone]  February 26, 2020    Thank you for allowing me to participate in the care of your patient.    Sincerely,     Sonia Kim MD     [facility]

## 2020-02-26 NOTE — PROGRESS NOTES
CARDIOLOGY CONSULT    REASON FOR CONSULT: Mitral regurgitation    PRIMARY CARE PHYSICIAN:  Physician No Ref-Primary    HISTORY OF PRESENT ILLNESS: Mr. Payne is a pleasant 64-year-old gentleman with past medical history significant for a bipolar disorder who presents for the evaluation of mitral regurgitation noted on recent echocardiogram obtained for new heart murmur.    Kumar [facility] and overall has been [facility].  Starting about 5 months ago, he noted significant tooth pain.  Around Matheus time, he began developing diffuse watery diarrhea.  He had frequent episodes throughout the day.  He denied any other infectious  symptoms including fevers chills nausea vomiting or abdominal pain.  There was no blood in his stools.  He was eating and drinking per usual.  The diarrhea resolved for about a week, however Kumar states that that has subsequently returned.  He is going to be seen by GI however not until March.  Kumar was also seen by the dentist last week and the tooth pain he had been experiencing and was in fact an infected tooth.  This was subsequently pulled.  Following his dental work, Kumar states that he developed a cold with upper respiratory symptoms including congestion and cough and this has persisted, although improving.  Kumar otherwise reports  Feeling well.  He is active at his job as a .  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  He denies any exertional chest pain, chest discomfort or shortness of breath.  He denies any orthopnea, PND or lower extremity edema.  He denies any heart palpitations or racing heart.  He is otherwise without cardiovascular complaints.  When he was seen by his primary care physician for diarrhea, heart murmur was noted.  An echocardiogram was obtained which demonstrated thickened mitral valve leaflets with very eccentric mitral regurgitation at least moderate if not more in severity.      PAST MEDICAL HISTORY:  1.  Bipolar  disorder: On Depakote      MEDICATIONS:  Current Outpatient Medications   Medication     divalproex (DEPAKOTE) 250 MG 24 hr tablet     ASPIRIN PO     IBUPROFEN PO     No current facility-administered medications for this visit.        ALLERGIES:  No Known Allergies    SOCIAL HISTORY:  Still works as a .  He is a non-smoker.  No significant alcohol.    FAMILY HISTORY:  I have reviewed this patient's family history and updated it with pertinent information if needed.   No family history on file.    REVIEW OF SYSTEMS:  A complete ROS was obtained and the pertinent positives are outlined in the history of present illness above.  The remainder of systems is negative.    PHYSICAL EXAM:      BP: 116/76 Pulse: 60     SpO2: 96 %      Vital Signs with Ranges  Pulse:  [60] 60  BP: (116)/(76) 116/76  SpO2:  [96 %] 96 %  229 lbs 14.4 oz    Constitutional: awake, alert, no distress  Eyes: PERRL, sclera nonicteric  ENT: trachea midline  Respiratory: CTAB  Cardiovascular: RRR, II/VI holosystolic murmur heard best at the base  GI: nondistended, nontender, bowel sounds present  Lymph/Hematologic: no lymphadenopathy  Skin: dry, no rash  Musculoskeletal: good muscle tone, no edema bilaterally  Neurologic: no focal deficits  Neuropsychiatric: appropriate affact    DATA:  Labs:   Reviewed in EPIC    EKG:  Dated 2/26/2020 reviewed personally.  Normal sinus rhythm without ST segment changes    TTE:  The mitral valve leaflets are mildly thickened.  Probable prolapse of the middle scallop of the posterior mitral leaflet.  The mitral regurgitant jet is eccentrically directed.  Evaluation of regurgitation is inadequate.  There is at least moderate to mod-severe (2-3+) mitral regurgitation.  Consider PHILLIP for further evaluation if clinically appropriate. There is no  comparison study available.    ASSESSMENT:  1.  Mitral regurgitation: I reviewed the transthoracic images personally.  He has normal left ventricular size and function.   The mitral valve leaflet appears thickened, possibly myxomatous.  There is very eccentric mitral regurgitation that is difficult to quantify, at least moderate in severity.  No evidence evidence for vegetation identified.  He is not symptomatic related to valve disease.    2.  Diarrheal illness occurring since December: Infectio he does not demonstrate us work-up to date has been negative.  He is scheduled to follow-up with GI for further evaluation.  3.  Recent tooth infection: Status post tooth extraction.  At high risk for bacteremia and seeding of abnormal mitral valve.  However, no clear evidence for infectious symptoms suggesting endocarditis.    RECOMMENDATIONS:  1. Reviewed the pathophysiology of mitral regurgitation, mitral valve disease and indications for intervention.  Transthoracic imaging is somewhat difficult and the severity of MR is difficult to assess.  He has had a several month history of tooth pain along with more recent diarrheal illness and we will need to make sure that bacteremia and vegetation is not .  This was discussed with Kumar and he is agreeable to the following:  -CRP, blood cultures today  -Schedule a PHILLIP for further evaluation of the mitral valve anatomy and severity of regurgitation  -Follow-up with me after PHILLIP to discuss results.    Sonia Kim MD  Cardiology - [facility]  Pager:  [phone]  February 26, 2020

## 2020-02-26 NOTE — PATIENT INSTRUCTIONS
-Blood work today   -Schedule PHILLIP (transesophageal echocardiogram) at [facility]   -Follow up after PHILLIP with Dr. Kim in [place]

## 2020-02-27 ENCOUNTER — TELEPHONE (OUTPATIENT)
Dept: CARDIOLOGY | Facility: CLINIC | Age: 65
End: 2020-02-27

## 2020-02-27 NOTE — TELEPHONE ENCOUNTER
Called patient to review PHILLIP Check List:      Therapeutic INR >2.0 x 4 weeks:N/A  Insulin/metformin/glipazide: NO  Digoxin/Lanoxin: NO  Patient aware to be NPO x 6hr except for medications.  Transportation home: Yes  H&P + consent + risk&benefit within 30 days: yes 2/26/20  Labwork ordered DAY OF procedure only: n/a    Patient aware of time, date, and location.

## 2020-02-28 ENCOUNTER — HOSPITAL ENCOUNTER (OUTPATIENT)
Facility: CLINIC | Age: 65
Discharge: HOME | End: 2020-02-28
Attending: INTERNAL MEDICINE | Admitting: INTERNAL MEDICINE
Payer: COMMERCIAL

## 2020-02-28 ENCOUNTER — HOSPITAL ENCOUNTER (OUTPATIENT)
Dept: CARDIOLOGY | Facility: CLINIC | Age: 65
End: 2020-02-28
Attending: INTERNAL MEDICINE | Admitting: INTERNAL MEDICINE
Payer: COMMERCIAL

## 2020-02-28 ENCOUNTER — CARE COORDINATION (OUTPATIENT)
Dept: CARDIOLOGY | Facility: CLINIC | Age: 65
End: 2020-02-28

## 2020-02-28 VITALS
HEIGHT: 72 IN | BODY MASS INDEX: 30.75 KG/M2 | OXYGEN SATURATION: 96 % | WEIGHT: 227 LBS | DIASTOLIC BLOOD PRESSURE: 78 MMHG | RESPIRATION RATE: 11 BRPM | SYSTOLIC BLOOD PRESSURE: 114 MMHG | TEMPERATURE: 98.4 F | HEART RATE: 60 BPM

## 2020-02-28 DIAGNOSIS — I38 HEART VALVE DISEASE: ICD-10-CM

## 2020-02-28 PROCEDURE — 93320 DOPPLER ECHO COMPLETE: CPT

## 2020-02-28 PROCEDURE — 93325 DOPPLER ECHO COLOR FLOW MAPG: CPT | Mod: 26 | Performed by: INTERNAL MEDICINE

## 2020-02-28 PROCEDURE — 93320 DOPPLER ECHO COMPLETE: CPT | Mod: 26 | Performed by: INTERNAL MEDICINE

## 2020-02-28 PROCEDURE — 25800030 ZZH RX IP 258 OP 636: Performed by: INTERNAL MEDICINE

## 2020-02-28 PROCEDURE — 40000235 ZZH STATISTIC TELEMETRY

## 2020-02-28 PROCEDURE — 40000857 ZZH STATISTIC TEE INCLUDES SEDATION

## 2020-02-28 PROCEDURE — 93312 ECHO TRANSESOPHAGEAL: CPT | Mod: 26 | Performed by: INTERNAL MEDICINE

## 2020-02-28 PROCEDURE — 25000125 ZZHC RX 250: Performed by: INTERNAL MEDICINE

## 2020-02-28 PROCEDURE — 99152 MOD SED SAME PHYS/QHP 5/>YRS: CPT | Performed by: INTERNAL MEDICINE

## 2020-02-28 PROCEDURE — 25000128 H RX IP 250 OP 636: Performed by: INTERNAL MEDICINE

## 2020-02-28 RX ORDER — GLYCOPYRROLATE 0.2 MG/ML
0.1 INJECTION, SOLUTION INTRAMUSCULAR; INTRAVENOUS ONCE
Status: COMPLETED | OUTPATIENT
Start: 2020-02-28 | End: 2020-02-28

## 2020-02-28 RX ORDER — LIDOCAINE HYDROCHLORIDE 40 MG/ML
1.5 SOLUTION TOPICAL ONCE
Status: COMPLETED | OUTPATIENT
Start: 2020-02-28 | End: 2020-02-28

## 2020-02-28 RX ORDER — LIDOCAINE 50 MG/G
OINTMENT TOPICAL ONCE
Status: COMPLETED | OUTPATIENT
Start: 2020-02-28 | End: 2020-02-28

## 2020-02-28 RX ORDER — NALOXONE HYDROCHLORIDE 0.4 MG/ML
.1-.4 INJECTION, SOLUTION INTRAMUSCULAR; INTRAVENOUS; SUBCUTANEOUS
Status: DISCONTINUED | OUTPATIENT
Start: 2020-02-28 | End: 2020-02-28 | Stop reason: HOSPADM

## 2020-02-28 RX ORDER — FLUMAZENIL 0.1 MG/ML
0.2 INJECTION, SOLUTION INTRAVENOUS
Status: DISCONTINUED | OUTPATIENT
Start: 2020-02-28 | End: 2020-02-28 | Stop reason: HOSPADM

## 2020-02-28 RX ORDER — LIDOCAINE 40 MG/G
CREAM TOPICAL
Status: DISCONTINUED | OUTPATIENT
Start: 2020-02-28 | End: 2020-02-28 | Stop reason: HOSPADM

## 2020-02-28 RX ORDER — SODIUM CHLORIDE 9 MG/ML
INJECTION, SOLUTION INTRAVENOUS CONTINUOUS PRN
Status: DISCONTINUED | OUTPATIENT
Start: 2020-02-28 | End: 2020-02-28 | Stop reason: HOSPADM

## 2020-02-28 RX ORDER — FENTANYL CITRATE 50 UG/ML
25 INJECTION, SOLUTION INTRAMUSCULAR; INTRAVENOUS
Status: DISCONTINUED | OUTPATIENT
Start: 2020-02-28 | End: 2020-02-28 | Stop reason: HOSPADM

## 2020-02-28 RX ORDER — DEXTROSE MONOHYDRATE 25 G/50ML
9.5 INJECTION, SOLUTION INTRAVENOUS
Status: DISCONTINUED | OUTPATIENT
Start: 2020-02-28 | End: 2020-02-28 | Stop reason: HOSPADM

## 2020-02-28 RX ADMIN — MIDAZOLAM HYDROCHLORIDE 0.5 MG: 1 INJECTION, SOLUTION INTRAMUSCULAR; INTRAVENOUS at 13:58

## 2020-02-28 RX ADMIN — FENTANYL CITRATE 25 MCG: 50 INJECTION, SOLUTION INTRAMUSCULAR; INTRAVENOUS at 13:31

## 2020-02-28 RX ADMIN — LIDOCAINE HYDROCHLORIDE 1.5 ML: 40 SOLUTION TOPICAL at 13:14

## 2020-02-28 RX ADMIN — MIDAZOLAM HYDROCHLORIDE 0.5 MG: 1 INJECTION, SOLUTION INTRAMUSCULAR; INTRAVENOUS at 13:42

## 2020-02-28 RX ADMIN — FENTANYL CITRATE 25 MCG: 50 INJECTION, SOLUTION INTRAMUSCULAR; INTRAVENOUS at 13:57

## 2020-02-28 RX ADMIN — MIDAZOLAM HYDROCHLORIDE 0.5 MG: 1 INJECTION, SOLUTION INTRAMUSCULAR; INTRAVENOUS at 13:34

## 2020-02-28 RX ADMIN — TOPICAL ANESTHETIC 0.5 ML: 200 SPRAY DENTAL; PERIODONTAL at 13:18

## 2020-02-28 RX ADMIN — GLYCOPYRROLATE 0.1 MG: 0.2 INJECTION, SOLUTION INTRAMUSCULAR; INTRAVENOUS at 13:11

## 2020-02-28 RX ADMIN — FENTANYL CITRATE 50 MCG: 50 INJECTION, SOLUTION INTRAMUSCULAR; INTRAVENOUS at 13:29

## 2020-02-28 RX ADMIN — SODIUM CHLORIDE: 9 INJECTION, SOLUTION INTRAVENOUS at 13:08

## 2020-02-28 RX ADMIN — MIDAZOLAM HYDROCHLORIDE 0.5 MG: 1 INJECTION, SOLUTION INTRAMUSCULAR; INTRAVENOUS at 13:31

## 2020-02-28 RX ADMIN — MIDAZOLAM HYDROCHLORIDE 1 MG: 1 INJECTION, SOLUTION INTRAMUSCULAR; INTRAVENOUS at 13:28

## 2020-02-28 RX ADMIN — FENTANYL CITRATE 25 MCG: 50 INJECTION, SOLUTION INTRAMUSCULAR; INTRAVENOUS at 13:34

## 2020-02-28 ASSESSMENT — MIFFLIN-ST. JEOR: SCORE: 1847.67

## 2020-02-28 NOTE — DISCHARGE INSTRUCTIONS
PHILLIP  (Transesophageal Echocardiogram)  Discharge Instructions    After you go home:      Have an adult stay with you for 6 hours.       For 24 hours - due to the sedation you received:    Relax and take it easy.    Do NOT make any important or legal decisions.    Do NOT drive or operate machines at home or at work.    Do NOT drink alcohol.    Diet:    You may resume your normal diet, but no scratchy foods for two days.    If your throat is sore, eat cold, bland or soft foods.    You may have heartburn if the tube used in the exam entered your stomach.  If so:   - Do not eat acidic and spicy foods.   - Do not eat three hours before bedtime. Clear liquids are okay.   - When lying down, use two pillows to raise your head.    Medicines:      Take your medications, including blood thinners, unless your provider tells you not to.    If you have stopped any medicines, check with your provider about when to restart them.    You may take Tylenol (Acetaminophen) if your throat is sore.    You may take antacids if you have heartburn.      Follow Up Appointments:      Follow up with your cardiologist at [facility] of patient preference as instructed.    Follow up with your primary care provider as needed.    Call the clinic if:      You have heartburn that is severe or lasts more than 72 hours.    You have a sore throat that feels worse after 72 hours.    You have shortness of breath, neck pain, chest pain, fever, chills, coughing up blood, or other unusual signs.    Questions or concerns      [facility] [place]:    [phone] [facility] (7 days a week)

## 2020-02-28 NOTE — PROGRESS NOTES
Care Suites Discharge Nursing Note    Patient Information  Name: Kumar Payne  Age: 64 year old    Discharge Education:  Discharge instructions reviewed: Yes    Patient/patient representative verbalizes understanding: Yes  Patient discharging on new medications: No  Medication education completed: N/A    Discharge Plans:   Discharge location: home  Discharge ride contacted: Yes  Approximate discharge time: 1500      Discharge Criteria:  Discharge criteria met and vital signs stable: Yes    Patient Belongs:  Patient belongings returned to patient: Yes    Lili Hernandez RN

## 2020-02-28 NOTE — PROGRESS NOTES
Labs reviewed; within normal limits.    Sonia Kim MD      Called patient to review lab work. Spoke with wife as patient was on his way to Echo/PHILLIP appointment currently.     DEDE Sands February 28, 2020 10:36 AM

## 2020-02-28 NOTE — PROGRESS NOTES
"PHILLIP images reviewed.  Demonstrates mitral regurgitation (\"leaky\" heart valve) is in the severe range.  Please have him follow up with me in [place] to discuss (I believe I have openings next week).     Thanks,   Sonia Kim MD       Called patient to discuss results from today. Patient confirmed understanding. Patient is scheduled for a follow up appointment this upcoming Monday with Dr. Kim.     Future Appointments   Date Time Provider Department Center   3/2/2020 11:00 AM Sonia Kim MD [facility]   3/17/2020  9:00 AM Sebastián Nam MD [facility]          DEDE Sands February 28, 2020 4:19 PM     "

## 2020-02-28 NOTE — PRE-PROCEDURE
GENERAL PRE-PROCEDURE:   Procedure:  PHILLIP  Date/Time:  2/28/2020 1:27 PM    Written consent obtained?: Yes    Risks and benefits: Risks, benefits and alternatives were discussed    Consent given by:  Patient  Patient states understanding of procedure being performed: Yes    Patient's understanding of procedure matches consent: Yes    Procedure consent matches procedure scheduled: Yes    Expected level of sedation:  Moderate  Appropriately NPO:  Yes  ASA Class:  Class 2- mild systemic disease, no acute problems, no functional limitations  Mallampati  :  Grade 2- soft palate, base of uvula, tonsillar pillars, and portion of posterior pharyngeal wall visible  Lungs:  Lungs clear with good breath sounds bilaterally  Heart:  Normal heart sounds and rate  History & Physical reviewed:  History and physical reviewed and no updates needed  Statement of review:  I have reviewed the lab findings, diagnostic data, medications, and the plan for sedation    The risks and benefits of PHILLIP have been discussed with the patient and/or relatives/ family in detail including the risks of serious complications including rare risk of death, esophageal tear or trauma, emergent surgery, pharyngeal hematoma/ trauma, methemoglobinemia, gastrointestinal bleeding etc. Patient denies any active upper GI issues or bleeding or difficult swallowing. Denies prior sedation related problems. The patient understands the above mentioned risks and is willing to proceed with the PHILLIP.

## 2020-02-28 NOTE — PROGRESS NOTES
Care Suites Admission Nursing Note    Patient Information  Name: Kumar Payne  Age: 64 year old  Reason for admission: PHILLIP.  Explained pre procedure to pt and daughter and gave discharge instructions with verbalized understanding.  Resting on cart with call light in reach.  Care Suites arrival time: ~1140    Patient Admission/Assessment   Pre-procedure assessment complete: Yes  If abnormal assessment/labs, provider notified: N/A  NPO: Yes  Medications held per instructions/orders: N/A  Consent: deferred  Patient oriented to room: Yes  Education/questions answered: Yes  Plan/other: Plan PHILLIP at 1330    Discharge Planning  Accompanied by: daughter  Discharge name/phone number: Christina [phone]  6 hr post sedation caregiver: yes  Discharge location: home    Lili Hernandez RN

## 2020-02-28 NOTE — PROGRESS NOTES
Care Suites Procedure Nursing Note    Patient Information  Name: Kumar Payne  Age: 64 year old    Procedure  Procedure: PHILLIP by Dr Farr.  Couple attempts to place probe and then down with IV sedation of Versed 3 mg and Fentanyl 125 mcg.  Awoke quickly. Daughter is here.  See flow sheet  Procedure start time: 1328  Procedure complete time: 1410  Concerns/abnormal assessment: no  If abnormal assessment, provider notified: N/A  Plan/Other: Home about 1500    Lili Hernandez RN

## 2020-03-02 ENCOUNTER — OFFICE VISIT (OUTPATIENT)
Dept: CARDIOLOGY | Facility: CLINIC | Age: 65
End: 2020-03-02
Payer: COMMERCIAL

## 2020-03-02 VITALS
SYSTOLIC BLOOD PRESSURE: 102 MMHG | OXYGEN SATURATION: 96 % | DIASTOLIC BLOOD PRESSURE: 70 MMHG | HEART RATE: 90 BPM | BODY MASS INDEX: 30.48 KG/M2 | HEIGHT: 72 IN | WEIGHT: 225 LBS

## 2020-03-02 DIAGNOSIS — I38 HEART VALVE DISEASE: ICD-10-CM

## 2020-03-02 PROCEDURE — 99214 OFFICE O/P EST MOD 30 MIN: CPT | Performed by: INTERNAL MEDICINE

## 2020-03-02 ASSESSMENT — MIFFLIN-ST. JEOR: SCORE: 1838.59

## 2020-03-02 NOTE — PATIENT INSTRUCTIONS
-GI work up as scheduled  -Coronary angiogram  -CV surgery consult with Dr. Coronel             Coronary Angiogram    Scheduled: TBD  Location: [facility]  Check-in time: TBD  Procedure time: TBD      Prep instructions were reviewed with patient in clinic. Patient had no questions.    See instructions below    If procedure is before 12 noon  NPO after midnight, the night before the procedure.     If procedure is after 12 noon   Clear liquid breakfast before 8:00 am. NPO after 8:00 am.       Patient will take 325 mg of Aspirin on the day before the procedure, and 325 mg of Aspirin on the morning of the procedure.     All other medications can be taken on the morning of the procedure (with small sips of water).      Patient is aware they will need a ride home, and a person to stay with him for 24 hours after the procedure.       Eveline Pierre RN

## 2020-03-02 NOTE — LETTER
3/2/2020    Physician No Ref-Primary  No address on file    RE: Kumar Payne       Dear Colleague,    I had the pleasure of seeing Kumar Payne in the [facility].    CARDIOLOGY CLINIC VISIT  DATE OF SERVICE:  March 2, 2020    PRIMARY CARE PHYSICIAN:  Physician No Ref-Primary    HISTORY OF PRESENT ILLNESS: Mr. Payne is a pleasant 64-year-old gentleman with past medical history significant for a bipolar disorder, recent issues with diarrhea and weight loss of unclear etiology, severe mitral regurgitation who presents to clinic today for follow up. He was last seen by me in February 2020.  In brief review, around Matheus time, he began developing diffuse watery diarrhea.  He had frequent episodes throughout the day.  He denied any other infectious  symptoms including fevers chills nausea vomiting or abdominal pain.  There was no blood in his stools.  He was eating and drinking per usual.  The diarrhea resolved for about a week, however Kumar states that that has subsequently returned.  He is going to be seen by GI however not until March.  He has had a 20 pound weight loss as a result.  Kumar also noted tooth pain about 5 months ago.  He was  seen by the dentist in mid February and the tooth pain he had been experiencing and was in fact an infected tooth.  This was subsequently pulled.  Following his dental work, Kumar states that he developed a cold with upper respiratory symptoms including congestion and cough and this has persisted, although improving.    He was seen by his primary care physician for diarrhea, heart murmur was noted.  An echocardiogram was obtained which demonstrated thickened mitral valve leaflets with very eccentric mitral regurgitation at least moderate if not more in severity.  A PHILLIP was subsequently performed which demonstrates severe eccentric MR secondary to partial flail of the P2 segment.  LV function was normal.  There was no evidence for vegetation.   Blood cultures were also drawn which were negative.  A CRP was not elevated.  In follow up today, Kumar continues to have issues with diarrhea.  Stool analysis has been negative for infectious source.  He will be seeing GI later this month.  He otherwise denies chest pain, chest discomfort or shortness of breath.  He denies orthopnea, PND or lower extremity edema.  He denies any heart palpitations        PAST MEDICAL HISTORY:  1.  Bipolar disorder: On Depakote  2.  Chronic diarrhea  3.  Severe mitral regurgitation:  Partial flail of P2 segment    MEDICATIONS:  Current Outpatient Medications   Medication     ASPIRIN PO     divalproex (DEPAKOTE) 250 MG 24 hr tablet     IBUPROFEN PO     No current facility-administered medications for this visit.        ALLERGIES:  No Known Allergies    SOCIAL HISTORY:  Still works as a .  He is a non-smoker.  No significant alcohol.    FAMILY HISTORY:  I have reviewed this patient's family history and updated it with pertinent information if needed.   No family history on file.    REVIEW OF SYSTEMS:  A complete ROS was obtained and the pertinent positives are outlined in the history of present illness above.  The remainder of systems is negative.    PHYSICAL EXAM:                     Vital Signs with Ranges     225 lbs 0 oz    Constitutional: awake, alert, no distress  Eyes: PERRL, sclera nonicteric  ENT: trachea midline  Respiratory: CTAB  Cardiovascular: RRR, II/VI holosystolic murmur heard best at the base  GI: nondistended, nontender, bowel sounds present  Lymph/Hematologic: no lymphadenopathy  Skin: dry, no rash  Musculoskeletal: good muscle tone, no edema bilaterally  Neurologic: no focal deficits  Neuropsychiatric: appropriate affact    DATA:  Labs:   Reviewed in EPIC    EKG:  Dated 2/26/2020 reviewed personally.  Normal sinus rhythm without ST segment changes    TTE:  The mitral valve leaflets are mildly thickened.  Probable prolapse of the middle scallop of the  posterior mitral leaflet.  The mitral regurgitant jet is eccentrically directed.  Evaluation of regurgitation is inadequate.  There is at least moderate to mod-severe (2-3+) mitral regurgitation.  Consider PHILLIP for further evaluation if clinically appropriate. There is no  comparison study available.    ASSESSMENT:  1.  Severe mitral regurgitation:  Secondary to partial flail of P2 segment.  Normal LV size and function.  No overt symptoms.    2.  Diarrheal illness occurring since December: Infectious work up to date has been negative.  .  He is scheduled to follow-up with GI for further evaluation.  3.  Recent tooth infection: Status post tooth extraction.  Blood cultures negative, CRP not elevated.  No evidence for vegetation on recent HPILLIP.      RECOMMENDATIONS:  1. Reviewed the pathophysiology of severe mitral regurgitation secondary to partial flail P2 segment.  While he is not overtly symptomatic, posterior leaflet shows partial flail and could likely be addressed with mitral valve repair (rather than replacement) and proceeding with early surgery would be reasonable.  Discussed with patient and his wife and he is also agreeable.  Will plan for diagnostic coronary angiogram followed by a CV surgery consult with Dr. Coronel for consideration of mitral valve repair.  2.  Of note, patient has been dealing with chronic diarrhea since December and has had a 20 pound weight loss.  He is seeing GI later this month.  His GI issues will need to be sorted out before considering valve surgery.      Sonia Kim MD  Cardiology - [facility]  Pager:  [phone]  March 2, 2020      Thank you for allowing me to participate in the care of your patient.      Sincerely,     Sonia Kim MD     [facility]    cc:   Sonia Kim MD  [facility] [place]  [address] [place] [id number]

## 2020-03-03 LAB
BACTERIA SPEC CULT: NO GROWTH
SPECIMEN SOURCE: NORMAL

## 2020-03-04 NOTE — PROGRESS NOTES
CARDIOLOGY CLINIC VISIT  DATE OF SERVICE:  March 2, 2020    PRIMARY CARE PHYSICIAN:  Physician No Ref-Primary    HISTORY OF PRESENT ILLNESS: Mr. Payne is a pleasant 64-year-old gentleman with past medical history significant for a bipolar disorder, recent issues with diarrhea and weight loss of unclear etiology, severe mitral regurgitation who presents to clinic today for follow up. He was last seen by me in February 2020.  In brief review, around Matheus time, he began developing diffuse watery diarrhea.  He had frequent episodes throughout the day.  He denied any other infectious  symptoms including fevers chills nausea vomiting or abdominal pain.  There was no blood in his stools.  He was eating and drinking per usual.  The diarrhea resolved for about a week, however Kumar states that that has subsequently returned.  He is going to be seen by GI however not until March.  He has had a 20 pound weight loss as a result.  Kumar also noted tooth pain about 5 months ago.  He was  seen by the dentist in mid February and the tooth pain he had been experiencing and was in fact an infected tooth.  This was subsequently pulled.  Following his dental work, Kumar states that he developed a cold with upper respiratory symptoms including congestion and cough and this has persisted, although improving.    He was seen by his primary care physician for diarrhea, heart murmur was noted.  An echocardiogram was obtained which demonstrated thickened mitral valve leaflets with very eccentric mitral regurgitation at least moderate if not more in severity.  A PHILLIP was subsequently performed which demonstrates severe eccentric MR secondary to partial flail of the P2 segment.  LV function was normal.  There was no evidence for vegetation.  Blood cultures were also drawn which were negative.  A CRP was not elevated.  In follow up today, Kumar continues to have issues with diarrhea.  Stool analysis has been negative for infectious source.  He  will be seeing GI later this month.  He otherwise denies chest pain, chest discomfort or shortness of breath.  He denies orthopnea, PND or lower extremity edema.  He denies any heart palpitations        PAST MEDICAL HISTORY:  1.  Bipolar disorder: On Depakote  2.  Chronic diarrhea  3.  Severe mitral regurgitation:  Partial flail of P2 segment    MEDICATIONS:  Current Outpatient Medications   Medication     ASPIRIN PO     divalproex (DEPAKOTE) 250 MG 24 hr tablet     IBUPROFEN PO     No current facility-administered medications for this visit.        ALLERGIES:  No Known Allergies    SOCIAL HISTORY:  Still works as a .  He is a non-smoker.  No significant alcohol.    FAMILY HISTORY:  I have reviewed this patient's family history and updated it with pertinent information if needed.   No family history on file.    REVIEW OF SYSTEMS:  A complete ROS was obtained and the pertinent positives are outlined in the history of present illness above.  The remainder of systems is negative.    PHYSICAL EXAM:                     Vital Signs with Ranges     225 lbs 0 oz    Constitutional: awake, alert, no distress  Eyes: PERRL, sclera nonicteric  ENT: trachea midline  Respiratory: CTAB  Cardiovascular: RRR, II/VI holosystolic murmur heard best at the base  GI: nondistended, nontender, bowel sounds present  Lymph/Hematologic: no lymphadenopathy  Skin: dry, no rash  Musculoskeletal: good muscle tone, no edema bilaterally  Neurologic: no focal deficits  Neuropsychiatric: appropriate affact    DATA:  Labs:   Reviewed in EPIC    EKG:  Dated 2/26/2020 reviewed personally.  Normal sinus rhythm without ST segment changes    TTE:  The mitral valve leaflets are mildly thickened.  Probable prolapse of the middle scallop of the posterior mitral leaflet.  The mitral regurgitant jet is eccentrically directed.  Evaluation of regurgitation is inadequate.  There is at least moderate to mod-severe (2-3+) mitral regurgitation.  Consider PHILLIP  for further evaluation if clinically appropriate. There is no  comparison study available.    ASSESSMENT:  1.  Severe mitral regurgitation:  Secondary to partial flail of P2 segment.  Normal LV size and function.  No overt symptoms.    2.  Diarrheal illness occurring since December: Infectious work up to date has been negative.  .  He is scheduled to follow-up with GI for further evaluation.  3.  Recent tooth infection: Status post tooth extraction.  Blood cultures negative, CRP not elevated.  No evidence for vegetation on recent PHILLIP.      RECOMMENDATIONS:  1. Reviewed the pathophysiology of severe mitral regurgitation secondary to partial flail P2 segment.  While he is not overtly symptomatic, posterior leaflet shows partial flail and could likely be addressed with mitral valve repair (rather than replacement) and proceeding with early surgery would be reasonable.  Discussed with patient and his wife and he is also agreeable.  Will plan for diagnostic coronary angiogram followed by a CV surgery consult with Dr. Coronel for consideration of mitral valve repair.  2.  Of note, patient has been dealing with chronic diarrhea since December and has had a 20 pound weight loss.  He is seeing GI later this month.  His GI issues will need to be sorted out before considering valve surgery.      Sonia Kim MD  Cardiology - [facility]  Pager:  [phone]  March 2, 2020

## 2020-03-13 ENCOUNTER — TELEPHONE (OUTPATIENT)
Dept: CARDIOLOGY | Facility: CLINIC | Age: 65
End: 2020-03-13

## 2020-03-13 DIAGNOSIS — I38 HEART VALVE DISEASE: Primary | ICD-10-CM

## 2020-03-13 RX ORDER — POTASSIUM CHLORIDE 1500 MG/1
20 TABLET, EXTENDED RELEASE ORAL
Status: CANCELLED | OUTPATIENT
Start: 2020-03-13

## 2020-03-13 RX ORDER — SODIUM CHLORIDE 9 MG/ML
INJECTION, SOLUTION INTRAVENOUS CONTINUOUS
Status: CANCELLED | OUTPATIENT
Start: 2020-03-13

## 2020-03-13 RX ORDER — LIDOCAINE 40 MG/G
CREAM TOPICAL
Status: CANCELLED | OUTPATIENT
Start: 2020-03-13

## 2020-03-13 NOTE — TELEPHONE ENCOUNTER
Called patient with Pre cath instructions:     Contrast allergy: no  Anticoagulation: no   Metformin: no  Oral DM meds: no  Insulin: no  Diuretic: no  Use of phosphodiesterase type 5 inhibitor: no  Aspirin: yes 325mg   Pt informed to be NPO at midnight  Renal issues no  Pt has transportation and 24 hours post procedure monitoring set up.   Pt aware of no driving for 24 hours post procedure.     Pt aware of arrival time and location. Pt verbalized understanding of instructions.         Travel Screen:  Travel out of the country within the last 30 days-no  Fever-no

## 2020-03-16 ENCOUNTER — OFFICE VISIT (OUTPATIENT)
Dept: CARDIOLOGY | Facility: CLINIC | Age: 65
End: 2020-03-16
Attending: INTERNAL MEDICINE
Payer: COMMERCIAL

## 2020-03-16 ENCOUNTER — HOSPITAL ENCOUNTER (OUTPATIENT)
Facility: CLINIC | Age: 65
Discharge: HOME | End: 2020-03-16
Admitting: INTERNAL MEDICINE
Payer: COMMERCIAL

## 2020-03-16 ENCOUNTER — SURGERY (OUTPATIENT)
Age: 65
End: 2020-03-16
Payer: COMMERCIAL

## 2020-03-16 VITALS
HEART RATE: 64 BPM | BODY MASS INDEX: 30.61 KG/M2 | DIASTOLIC BLOOD PRESSURE: 75 MMHG | HEIGHT: 72 IN | WEIGHT: 226 LBS | SYSTOLIC BLOOD PRESSURE: 119 MMHG

## 2020-03-16 VITALS
HEIGHT: 72 IN | HEART RATE: 68 BPM | TEMPERATURE: 97.8 F | BODY MASS INDEX: 30.61 KG/M2 | WEIGHT: 226 LBS | SYSTOLIC BLOOD PRESSURE: 113 MMHG | DIASTOLIC BLOOD PRESSURE: 71 MMHG | RESPIRATION RATE: 16 BRPM | OXYGEN SATURATION: 97 %

## 2020-03-16 DIAGNOSIS — I38 HEART VALVE DISEASE: ICD-10-CM

## 2020-03-16 PROBLEM — Z98.890 STATUS POST CORONARY ANGIOGRAM: Status: ACTIVE | Noted: 2020-03-16

## 2020-03-16 LAB
ANION GAP SERPL CALCULATED.3IONS-SCNC: 5 MMOL/L (ref 3–14)
APTT PPP: 27 SEC (ref 22–37)
BUN SERPL-MCNC: 10 MG/DL (ref 7–30)
CALCIUM SERPL-MCNC: 8.8 MG/DL (ref 8.5–10.1)
CHLORIDE SERPL-SCNC: 110 MMOL/L (ref 94–109)
CO2 SERPL-SCNC: 25 MMOL/L (ref 20–32)
CREAT SERPL-MCNC: 0.83 MG/DL (ref 0.66–1.25)
ERYTHROCYTE [DISTWIDTH] IN BLOOD BY AUTOMATED COUNT: 13.3 % (ref 10–15)
GFR SERPL CREATININE-BSD FRML MDRD: >90 ML/MIN/{1.73_M2}
GLUCOSE SERPL-MCNC: 102 MG/DL (ref 70–99)
HCT VFR BLD AUTO: 43.1 % (ref 40–53)
HGB BLD-MCNC: 15 G/DL (ref 13.3–17.7)
INR PPP: 1.09 (ref 0.86–1.14)
MCH RBC QN AUTO: 29.8 PG (ref 26.5–33)
MCHC RBC AUTO-ENTMCNC: 34.8 G/DL (ref 31.5–36.5)
MCV RBC AUTO: 86 FL (ref 78–100)
PLATELET # BLD AUTO: 291 10E9/L (ref 150–450)
POTASSIUM SERPL-SCNC: 4 MMOL/L (ref 3.4–5.3)
RBC # BLD AUTO: 5.04 10E12/L (ref 4.4–5.9)
SODIUM SERPL-SCNC: 140 MMOL/L (ref 133–144)
WBC # BLD AUTO: 10.3 10E9/L (ref 4–11)

## 2020-03-16 PROCEDURE — 25000125 ZZHC RX 250: Performed by: INTERNAL MEDICINE

## 2020-03-16 PROCEDURE — 99152 MOD SED SAME PHYS/QHP 5/>YRS: CPT | Performed by: INTERNAL MEDICINE

## 2020-03-16 PROCEDURE — 40000235 ZZH STATISTIC TELEMETRY

## 2020-03-16 PROCEDURE — 27210794 ZZH OR GENERAL SUPPLY STERILE: Performed by: INTERNAL MEDICINE

## 2020-03-16 PROCEDURE — C1894 INTRO/SHEATH, NON-LASER: HCPCS | Performed by: INTERNAL MEDICINE

## 2020-03-16 PROCEDURE — 93010 ELECTROCARDIOGRAM REPORT: CPT | Performed by: INTERNAL MEDICINE

## 2020-03-16 PROCEDURE — C1769 GUIDE WIRE: HCPCS | Performed by: INTERNAL MEDICINE

## 2020-03-16 PROCEDURE — 85730 THROMBOPLASTIN TIME PARTIAL: CPT

## 2020-03-16 PROCEDURE — 36415 COLL VENOUS BLD VENIPUNCTURE: CPT

## 2020-03-16 PROCEDURE — 99153 MOD SED SAME PHYS/QHP EA: CPT | Performed by: INTERNAL MEDICINE

## 2020-03-16 PROCEDURE — 85027 COMPLETE CBC AUTOMATED: CPT

## 2020-03-16 PROCEDURE — 93005 ELECTROCARDIOGRAM TRACING: CPT

## 2020-03-16 PROCEDURE — C1887 CATHETER, GUIDING: HCPCS | Performed by: INTERNAL MEDICINE

## 2020-03-16 PROCEDURE — 93454 CORONARY ARTERY ANGIO S&I: CPT | Performed by: INTERNAL MEDICINE

## 2020-03-16 PROCEDURE — 25000128 H RX IP 250 OP 636: Performed by: INTERNAL MEDICINE

## 2020-03-16 PROCEDURE — 40000065 ZZH STATISTIC EKG NON-CHARGEABLE

## 2020-03-16 PROCEDURE — 40000852 ZZH STATISTIC HEART CATH LAB OR EP LAB

## 2020-03-16 PROCEDURE — 80048 BASIC METABOLIC PNL TOTAL CA: CPT

## 2020-03-16 PROCEDURE — 25000132 ZZH RX MED GY IP 250 OP 250 PS 637: Performed by: INTERNAL MEDICINE

## 2020-03-16 PROCEDURE — 25800030 ZZH RX IP 258 OP 636: Performed by: INTERNAL MEDICINE

## 2020-03-16 PROCEDURE — 93454 CORONARY ARTERY ANGIO S&I: CPT | Mod: 26 | Performed by: INTERNAL MEDICINE

## 2020-03-16 PROCEDURE — 85610 PROTHROMBIN TIME: CPT

## 2020-03-16 RX ORDER — ARGATROBAN 1 MG/ML
150 INJECTION, SOLUTION INTRAVENOUS
Status: DISCONTINUED | OUTPATIENT
Start: 2020-03-16 | End: 2020-03-16 | Stop reason: HOSPADM

## 2020-03-16 RX ORDER — FENTANYL CITRATE 50 UG/ML
25-50 INJECTION, SOLUTION INTRAMUSCULAR; INTRAVENOUS
Status: DISCONTINUED | OUTPATIENT
Start: 2020-03-16 | End: 2020-03-16 | Stop reason: HOSPADM

## 2020-03-16 RX ORDER — NITROGLYCERIN 5 MG/ML
VIAL (ML) INTRAVENOUS
Status: DISCONTINUED | OUTPATIENT
Start: 2020-03-16 | End: 2020-03-16 | Stop reason: HOSPADM

## 2020-03-16 RX ORDER — IOPAMIDOL 755 MG/ML
INJECTION, SOLUTION INTRAVASCULAR
Status: DISCONTINUED | OUTPATIENT
Start: 2020-03-16 | End: 2020-03-16 | Stop reason: HOSPADM

## 2020-03-16 RX ORDER — DOPAMINE HYDROCHLORIDE 160 MG/100ML
2-20 INJECTION, SOLUTION INTRAVENOUS CONTINUOUS PRN
Status: DISCONTINUED | OUTPATIENT
Start: 2020-03-16 | End: 2020-03-16 | Stop reason: HOSPADM

## 2020-03-16 RX ORDER — FLUMAZENIL 0.1 MG/ML
0.2 INJECTION, SOLUTION INTRAVENOUS
Status: DISCONTINUED | OUTPATIENT
Start: 2020-03-16 | End: 2020-03-16 | Stop reason: HOSPADM

## 2020-03-16 RX ORDER — SODIUM CHLORIDE 9 MG/ML
INJECTION, SOLUTION INTRAVENOUS CONTINUOUS
Status: DISCONTINUED | OUTPATIENT
Start: 2020-03-16 | End: 2020-03-16 | Stop reason: HOSPADM

## 2020-03-16 RX ORDER — LIDOCAINE 40 MG/G
CREAM TOPICAL
Status: DISCONTINUED | OUTPATIENT
Start: 2020-03-16 | End: 2020-03-16 | Stop reason: HOSPADM

## 2020-03-16 RX ORDER — ARGATROBAN 1 MG/ML
350 INJECTION, SOLUTION INTRAVENOUS
Status: DISCONTINUED | OUTPATIENT
Start: 2020-03-16 | End: 2020-03-16 | Stop reason: HOSPADM

## 2020-03-16 RX ORDER — NALOXONE HYDROCHLORIDE 0.4 MG/ML
.1-.4 INJECTION, SOLUTION INTRAMUSCULAR; INTRAVENOUS; SUBCUTANEOUS
Status: DISCONTINUED | OUTPATIENT
Start: 2020-03-16 | End: 2020-03-16 | Stop reason: HOSPADM

## 2020-03-16 RX ORDER — POTASSIUM CHLORIDE 1500 MG/1
20 TABLET, EXTENDED RELEASE ORAL
Status: DISCONTINUED | OUTPATIENT
Start: 2020-03-16 | End: 2020-03-16 | Stop reason: HOSPADM

## 2020-03-16 RX ORDER — EPTIFIBATIDE 2 MG/ML
2 INJECTION, SOLUTION INTRAVENOUS CONTINUOUS PRN
Status: DISCONTINUED | OUTPATIENT
Start: 2020-03-16 | End: 2020-03-16 | Stop reason: HOSPADM

## 2020-03-16 RX ORDER — NITROGLYCERIN 20 MG/100ML
.07-2 INJECTION INTRAVENOUS CONTINUOUS PRN
Status: DISCONTINUED | OUTPATIENT
Start: 2020-03-16 | End: 2020-03-16 | Stop reason: HOSPADM

## 2020-03-16 RX ORDER — EPTIFIBATIDE 2 MG/ML
180 INJECTION, SOLUTION INTRAVENOUS EVERY 10 MIN PRN
Status: DISCONTINUED | OUTPATIENT
Start: 2020-03-16 | End: 2020-03-16 | Stop reason: HOSPADM

## 2020-03-16 RX ORDER — VERAPAMIL HYDROCHLORIDE 2.5 MG/ML
INJECTION, SOLUTION INTRAVENOUS
Status: DISCONTINUED | OUTPATIENT
Start: 2020-03-16 | End: 2020-03-16 | Stop reason: HOSPADM

## 2020-03-16 RX ORDER — HEPARIN SODIUM 10000 [USP'U]/100ML
100-1000 INJECTION, SOLUTION INTRAVENOUS CONTINUOUS PRN
Status: DISCONTINUED | OUTPATIENT
Start: 2020-03-16 | End: 2020-03-16 | Stop reason: HOSPADM

## 2020-03-16 RX ORDER — DOBUTAMINE HYDROCHLORIDE 200 MG/100ML
2-20 INJECTION INTRAVENOUS CONTINUOUS PRN
Status: DISCONTINUED | OUTPATIENT
Start: 2020-03-16 | End: 2020-03-16 | Stop reason: HOSPADM

## 2020-03-16 RX ORDER — NALOXONE HYDROCHLORIDE 0.4 MG/ML
.2-.4 INJECTION, SOLUTION INTRAMUSCULAR; INTRAVENOUS; SUBCUTANEOUS
Status: DISCONTINUED | OUTPATIENT
Start: 2020-03-16 | End: 2020-03-16 | Stop reason: HOSPADM

## 2020-03-16 RX ORDER — FENTANYL CITRATE 50 UG/ML
INJECTION, SOLUTION INTRAMUSCULAR; INTRAVENOUS
Status: DISCONTINUED | OUTPATIENT
Start: 2020-03-16 | End: 2020-03-16 | Stop reason: HOSPADM

## 2020-03-16 RX ORDER — ACETAMINOPHEN 325 MG/1
650 TABLET ORAL EVERY 4 HOURS PRN
Status: DISCONTINUED | OUTPATIENT
Start: 2020-03-16 | End: 2020-03-16 | Stop reason: HOSPADM

## 2020-03-16 RX ORDER — ATROPINE SULFATE 0.1 MG/ML
0.5 INJECTION INTRAVENOUS EVERY 5 MIN PRN
Status: DISCONTINUED | OUTPATIENT
Start: 2020-03-16 | End: 2020-03-16 | Stop reason: HOSPADM

## 2020-03-16 RX ADMIN — ASPIRIN 325 MG: 325 TABLET, DELAYED RELEASE ORAL at 08:11

## 2020-03-16 RX ADMIN — NITROGLYCERIN 200 MCG: 5 INJECTION, SOLUTION INTRAVENOUS at 11:27

## 2020-03-16 RX ADMIN — MIDAZOLAM 1 MG: 1 INJECTION INTRAMUSCULAR; INTRAVENOUS at 11:13

## 2020-03-16 RX ADMIN — SODIUM CHLORIDE: 9 INJECTION, SOLUTION INTRAVENOUS at 08:13

## 2020-03-16 RX ADMIN — FENTANYL CITRATE 50 MCG: 50 INJECTION, SOLUTION INTRAMUSCULAR; INTRAVENOUS at 11:12

## 2020-03-16 RX ADMIN — IOPAMIDOL 45 ML: 755 INJECTION, SOLUTION INTRAVENOUS at 11:35

## 2020-03-16 RX ADMIN — MIDAZOLAM 1 MG: 1 INJECTION INTRAMUSCULAR; INTRAVENOUS at 11:26

## 2020-03-16 RX ADMIN — LIDOCAINE HYDROCHLORIDE 1 ML: 10 INJECTION, SOLUTION EPIDURAL; INFILTRATION; INTRACAUDAL; PERINEURAL at 11:26

## 2020-03-16 RX ADMIN — MIDAZOLAM 1 MG: 1 INJECTION INTRAMUSCULAR; INTRAVENOUS at 11:08

## 2020-03-16 RX ADMIN — FENTANYL CITRATE 50 MCG: 50 INJECTION, SOLUTION INTRAMUSCULAR; INTRAVENOUS at 11:08

## 2020-03-16 RX ADMIN — VERAPAMIL HYDROCHLORIDE 2.5 MG: 2.5 INJECTION, SOLUTION INTRAVENOUS at 11:27

## 2020-03-16 ASSESSMENT — MIFFLIN-ST. JEOR
SCORE: 1843.13
SCORE: 1843.13

## 2020-03-16 NOTE — DISCHARGE INSTRUCTIONS
Cardiac Angiogram Discharge Instructions - Radial    After you go home:      Have an adult stay with you until tomorrow.    Drink extra fluids for 2 days.    You may resume your normal diet.    No smoking       For 24 hours - due to the sedation you received:    Relax and take it easy.    Do NOT make any important or legal decisions.    Do NOT drive or operate machines at home or at work.    Do NOT drink alcohol.    Care of Wrist Puncture Site:      For the first 24 hrs - check the puncture site every 1-2 hours while awake.    It is normal to have soreness at the puncture site and mild tingling in your hand for up to 3 days.    Remove the bandaid after 24 hours. If there is minor oozing, apply another bandaid and remove it after 12 hours.    You may shower tomorrow.  Do NOT take a bath, or use a hot tub or pool for at least 3 days. Do NOT scrub the site. Do not use lotion or powder near the puncture site.           Activity:        For 2 days:     do not use your hand or arm to support your weight (such as rising from a chair)     do not bend your wrist (such as lifting a garage door).    do not lift more than 5 pounds or exercise your arm (such as tennis, golf or bowling).    Do NOT do any heavy activity such as exercise, lifting, or straining.     Bleeding:      If you start bleeding from the site in your wrist, sit down and press firmly on/above the site for 10 minutes.     Once bleeding stops, keep arm still for 2 hours.     Call [facility] as soon as you can.       Call 911 right away if you have heavy bleeding or bleeding that does not stop.      Medicines:      If you are taking an antiplatelet medication such as Plavix, Brilinta or Effient, do not stop taking it until you talk to your cardiologist.        If you are on Metformin (Glucophage), do not restart it until you have blood tests (within 2 to 3 days after discharge).  After you have your blood drawn, you may restart the Metformin.     Take your  medications, including blood thinners, unless your provider tells you not to.  If you take Coumadin (Warfarin), have your INR checked by your provider in  3-5 days. Call your clinic to schedule this.    If you have stopped any medicines, check with your provider about when to restart them.    Follow Up Appointments:      Follow up with [facility] Nurse Practitioner at [facility] of patient preference in 7-10 days.    Call the clinic if:      You have a large or growing hard lump around the site.    The site is red, swollen, hot or tender.    Blood or fluid is draining from the site.    You have chills or a fever greater than 101 F (38 C).    Your arm feels numb, cool or changes color.    You have hives, a rash or unusual itching.    Any questions or concerns.          [facility] [place]:    [phone] [facility] (7 days a week)

## 2020-03-16 NOTE — LETTER
RE: Kumar Payne  [address] [facility] [address]     CV Surgery    Patient seen, clinic note dictated #[id number].  Will await GI consult. TAVR protocol CT scan, will ask all PHILLIP images to be uploaded to review.  Evelin Coronel MD    Service Date: 03/16/2020      REFERRING CARDIOLOGIST:  Sonia Kim MD      REASON FOR CONSULTATION:  Evaluation for severe mitral valve regurgitation.      HISTORY OF PRESENT ILLNESS:  Mr. Payne is a very pleasant 64-year-old previously healthy gentleman who was recently discovered to have severe mitral valve regurgitation.  His pertinent medical history dates back to several months ago when he started having unexplained GI symptoms with diffuse watery diarrhea.  This finally resolved but he does have a GI Clinic visit this week.  During a workup, he had an echocardiogram that demonstrated significant mitral valve regurgitation.  This was confirmed on PHILLIP.  The PHILLIP also demonstrated a very eccentric anteriorly directed MR with a flail P2 segment.  His LV function was preserved without any other valvular abnormalities.  Given this finding, the patient was referred to me for evaluation for mitral valve repair.  He also had a coronary angiogram performed this morning that demonstrated normal coronary arteries.      PAST MEDICAL/SURGICAL HISTORY:  Recent chronic diarrhea that is resolving, bipolar disorder, on Depakote.      ALLERGIES:  No known drug allergies.      CURRENT OUTPATIENT MEDICATIONS:   1.  Aspirin.   2.  Depakote.   3.  Ibuprofen as needed.      FAMILY HISTORY:  Noncontributory.      SOCIAL HISTORY:  He works as a .  He is a nonsmoker, does not drink alcohol either.      REVIEW OF SYSTEMS:  As per HPI.  All other 10-point review of systems are completed and were otherwise negative unless stated above.      PHYSICAL EXAMINATION:   VITAL SIGNS:  Blood pressure is 119/75, pulse is 64.  He is 102 kg, 6 feet tall, BMI of 30, BSA 2.28 m2.   GENERAL:  He  appears well, in no acute distress.   HEENT:  Within normal limits.   NECK:  Supple, no lymphadenopathy.   CARDIOVASCULAR:  Regular rate and rhythm, normal S1, S2.  Grade 3/6 systolic murmur at the apex.   LUNGS:  Clear bilaterally.   ABDOMEN:  Soft, nontender, nondistended.   EXTREMITIES:  Negative for edema, cyanosis or clubbing.   NEUROLOGIC:  A&O x3 with no focal deficits.      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  There is trivial TR, normal aortic valve, normal LV systolic function.  No PFO.  Coronary angiogram performed today by Dr. Fournier demonstrates no coronary artery disease.      IMPRESSION AND PLAN:  Mr. Payne is a very pleasant 64-year-old previously healthy gentleman with a newly diagnosed severe mitral valve regurgitation from what appears to be a flail P2 segment.  I reviewed the PHILLIP images, but unfortunately, not all the images are uploaded and I cannot review all the windows including the 3D imaging.  We are trying to obtain this actively.  Even with limited PHILLIP views, however, the mitral valve appears to be from a flail P2 segment and this would most likely be a repairable valve.  I explained to the patient the mitral valve pathology and the reason for recommending a mitral valve repair.  I also think that he would be an excellent minimally invasive mitral valve repair candidate via right mini thoracotomy approach.  There is a greater than 95% chance that the valve could be repaired successfully.  In the unlikely event, I did discuss prosthetic mitral valve replacement with bioprosthetic versus mechanical and explained the pros and cons of both types.  He will think about this and decide prior to surgery which prosthetic valve he would like in the very unlikely event of the valve not being able to be repaired.  At age  64, I think we could go either way.  We will also await until he gets cleared from GI.  We will get a TAVR protocol CT scan in the interim to make sure that he is a right mini thoracotomy mitral valve repair candidate.  I explained to the patient the diagnosis in detail, the reason for recommending the operation and the potential complications associated with the surgery.  These complications include but are not strictly limited to infection, bleeding, stroke, postop MI, postop ___, pulmonary or renal complications.  I think overall he is an excellent surgical candidate and I quoted an operative mortality risk of around 1%.  The patient understands and agrees to proceed.  We will wait for the TAVR protocol CT scan, GI consult and reviewing all the PHILLIP windows and schedule for a right mini thoracotomy mitral valve repair and possible replacement in the near future.  Thank you for this referral.      THERON ABEBE MD     cc:   Sonia Kim MD   [facility]   [address]   [place]  [address]      D: 2020   T: 2020   MT: aldo    Name:     MUSHTAQ CLARKE   MRN:      [id number]        Account:      [id number]   :      1955           Service Date: 2020    Document: [id number]

## 2020-03-16 NOTE — LETTER
3/16/2020       RE: Kumar Payne  [address] [facility] [address]     Dear Colleague,    Thank you for referring your patient, Kumar Payne, to the [facility] CARDIOTHORACIC at [facility]. Please see a copy of my visit note below.    CV Surgery    Will await GI consult. TAVR protocol CT scan, will ask all PHILLIP images to be uploaded to review.    Evelin Coronel MD    Service Date: 03/16/2020      REFERRING CARDIOLOGIST:  Sonia Kim MD      REASON FOR CONSULTATION:  Evaluation for severe mitral valve regurgitation.      HISTORY OF PRESENT ILLNESS:  Mr. Payne is a very pleasant 64-year-old previously healthy gentleman who was recently discovered to have severe mitral valve regurgitation.  His pertinent medical history dates back to several months ago when he started having unexplained GI symptoms with diffuse watery diarrhea.  This finally resolved but he does have a GI Clinic visit this week.  During a workup, he had an echocardiogram that demonstrated significant mitral valve regurgitation.  This was confirmed on PHILLIP.  The PHILLIP also demonstrated a very eccentric anteriorly directed MR with a flail P2 segment.  His LV function was preserved without any other valvular abnormalities.  Given this finding, the patient was referred to me for evaluation for mitral valve repair.  He also had a coronary angiogram performed this morning that demonstrated normal coronary arteries.      PAST MEDICAL/SURGICAL HISTORY:  Recent chronic diarrhea that is resolving, bipolar disorder, on Depakote.      ALLERGIES:  No known drug allergies.      CURRENT OUTPATIENT MEDICATIONS:   1.  Aspirin.   2.  Depakote.   3.  Ibuprofen as needed.      FAMILY HISTORY:  Noncontributory.      SOCIAL HISTORY:  He works as a .  He is a nonsmoker, does not drink alcohol either.      REVIEW OF SYSTEMS:  As per HPI.  All other 10-point review of systems are completed and were otherwise negative unless stated  above.      PHYSICAL EXAMINATION:   VITAL SIGNS:  Blood pressure is 119/75, pulse is 64.  He is 102 kg, 6 feet tall, BMI of 30, BSA 2.28 m2.   GENERAL:  He appears well, in no acute distress.   HEENT:  Within normal limits.   NECK:  Supple, no lymphadenopathy.   CARDIOVASCULAR:  Regular rate and rhythm, normal S1, S2.  Grade 3/6 systolic murmur at the apex.   LUNGS:  Clear bilaterally.   ABDOMEN:  Soft, nontender, nondistended.   EXTREMITIES:  Negative for edema, cyanosis or clubbing.   NEUROLOGIC:  A&O x3 with no focal deficits.      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  There is trivial TR, normal aortic valve, normal LV systolic function.  No PFO.  Coronary angiogram performed today by Dr. Fournier demonstrates no coronary artery disease.      IMPRESSION AND PLAN:  Mr. Payne is a very pleasant 64-year-old previously healthy gentleman with a newly diagnosed severe mitral valve regurgitation from what appears to be a flail P2 segment.  I reviewed the PHILLIP images, but unfortunately, not all the images are uploaded and I cannot review all the windows including the 3D imaging.  We are trying to obtain this actively.  Even with limited PHILLIP views, however, the mitral valve appears to be from a flail P2 segment and this would most likely be a repairable valve.  I explained to the patient the mitral valve pathology and the reason for recommending a mitral valve repair.  I also think that he would be an excellent minimally invasive mitral valve repair candidate via right mini thoracotomy approach.  There is a greater than 95% chance that the valve could be repaired successfully.  In the unlikely event, I did discuss prosthetic mitral valve replacement with bioprosthetic versus mechanical and explained the pros and cons of both types.  He will think  about this and decide prior to surgery which prosthetic valve he would like in the very unlikely event of the valve not being able to be repaired.  At age 64, I think we could go either way.  We will also await until he gets cleared from GI.  We will get a TAVR protocol CT scan in the interim to make sure that he is a right mini thoracotomy mitral valve repair candidate.  I explained to the patient the diagnosis in detail, the reason for recommending the operation and the potential complications associated with the surgery.  These complications include but are not strictly limited to infection, bleeding, stroke, postop MI, postop ***, pulmonary or renal complications.  I think overall he is an excellent surgical candidate and I quoted an operative mortality risk of around 1%.  The patient understands and agrees to proceed.  We will wait for the TAVR protocol CT scan, GI consult and reviewing all the PHILLIP windows and schedule for a right mini thoracotomy mitral valve repair and possible replacement in the near future.  Thank you for this referral.      cc:   Sonia Kim MD   [address]   [place]  [address]         THERON ABEBE MD             D: 2020   T: 2020   MT: aldo      Name:     MUSHTAQ CLARKE   MRN:      [id number]        Account:      [id number]   :      1955           Service Date: 2020      Document: [id number]

## 2020-03-16 NOTE — PROGRESS NOTES
PATIENT WELLNESS SCREENING    Step 1: Answer all screening questions 1-3.    1. In the last month, have you been in contact with someone who was confirmed or suspected to have Coronavirus/COVID-19? No,      2. Do you have the following symptoms?   Fever? No   Cough? No   Shortness of breath? No   Skin rash? No    3. Have you traveled internationally in the last month? No, explain     If so, where?     [place]  (Level 3, avoid all non-essential travel)  [place]  (Level 3, avoid all non-essential travel)  [place]  (Level 3, avoid all non-essential travel)  [place]  (Level 3, avoid all non-essential travel)    [place], [place], [place], [place], [place], [place], [place], [place], [place], [place], [place], [place], [place], [place], [place], [place], [place], [place], [place], [place], [place], [place], [place], [place], [place], [place], [facility], [place], [facility]    Step 2: Refer to logic grid below for actions    SYMPTOM(S) ONLY  (no travel or exposure)  *Fever  *Rash  *Cough  *Shortness of Breath    ACTIONS  1. Mask patient  2. Standard rooming process  3. Provider to assess per normal protocol    SUSPECT  (symptoms with travel and/or exposure)  *At least 1 symptom AND travel OR exposure  *At least 1 symptom AND travel AND exposure    ACTIONS  1. Mask patient  2. Room patient as soon as possible  3. Provider evaluation  4. Consult Infection Prevention as needed

## 2020-03-16 NOTE — PROGRESS NOTES
Care Suites Discharge Nursing Note    Patient Information  Name: Kumar Payne  Age: 64 year old    Discharge Education:  Discharge instructions reviewed: Yes  Additional education/resources provided:   Patient/patient representative verbalizes understanding: Yes  Patient discharging on new medications: No  Medication education completed: Yes    Discharge Plans:   Discharge location: home  Discharge ride contacted: N/A  Approximate discharge time: 1330 to the heart clinic for an appointment    Discharge Criteria:  Discharge criteria met and vital signs stable: Yes    Patient Belongs:  Patient belongings returned to patient: Yes    Suresh Coronel RN

## 2020-03-16 NOTE — PROCEDURES
[facility]    Procedure: *Cath without PCI    Date/Time: 3/16/2020 11:38 AM  Performed by: Tyrone Fournier MD  Authorized by: Tyrone Fournier MD     UNIVERSAL PROTOCOL   Site Marked: Yes  Prior Images Obtained and Reviewed:  Yes  Required items: Required blood products, implants, devices and special equipment available    Patient identity confirmed:  Verbally with patient  Patient was reevaluated immediately before administering moderate or deep sedation or anesthesia  Confirmation Checklist:  Patient's identity using two indicators  Time out: Immediately prior to the procedure a time out was called    Universal Protocol: the Joint Commission Universal Protocol was followed    Preparation: Patient was prepped and draped in usual sterile fashion           ANESTHESIA    Local Anesthetic: Lidocaine 1% without epinephrine      SEDATION    Patient Sedated: Yes    Vital signs: Vital signs monitored during sedation    PROCEDURE   Patient Tolerance:  Patient tolerated the procedure well with no immediate complications    Length of time physician/provider present for 1:1 monitoring during sedation: 25      Procedure  CAG  Approach RTR  Findings   Normal coronaries RCA dominant

## 2020-03-16 NOTE — LETTER
3/16/2020      RE: Kumar Payne  [address] [facility] [address]       CV Surgery    Patient seen, clinic note dictated #[id number].  Will await GI consult. TAVR protocol CT scan, will ask all PHILLIP images to be uploaded to review.    Evelin Coronel MD    Service Date: 03/16/2020      REFERRING CARDIOLOGIST:  Sonia Kim MD      REASON FOR CONSULTATION:  Evaluation for severe mitral valve regurgitation.      HISTORY OF PRESENT ILLNESS:  Mr. Payne is a very pleasant 64-year-old previously healthy gentleman who was recently discovered to have severe mitral valve regurgitation.  His pertinent medical history dates back to several months ago when he started having unexplained GI symptoms with diffuse watery diarrhea.  This finally resolved but he does have a GI Clinic visit this week.  During a workup, he had an echocardiogram that demonstrated significant mitral valve regurgitation.  This was confirmed on PHILLIP.  The PHILLIP also demonstrated a very eccentric anteriorly directed MR with a flail P2 segment.  His LV function was preserved without any other valvular abnormalities.  Given this finding, the patient was referred to me for evaluation for mitral valve repair.  He also had a coronary angiogram performed this morning that demonstrated normal coronary arteries.      PAST MEDICAL/SURGICAL HISTORY:  Recent chronic diarrhea that is resolving, bipolar disorder, on Depakote.      ALLERGIES:  No known drug allergies.      CURRENT OUTPATIENT MEDICATIONS:   1.  Aspirin.   2.  Depakote.   3.  Ibuprofen as needed.      FAMILY HISTORY:  Noncontributory.      SOCIAL HISTORY:  He works as a .  He is a nonsmoker, does not drink alcohol either.      REVIEW OF SYSTEMS:  As per HPI.  All other 10-point review of systems are completed and were otherwise negative unless stated above.      PHYSICAL EXAMINATION:   VITAL SIGNS:  Blood pressure is 119/75, pulse is 64.  He is 102 kg, 6 feet tall, BMI of 30, BSA 2.28 m2.    GENERAL:  He appears well, in no acute distress.   HEENT:  Within normal limits.   NECK:  Supple, no lymphadenopathy.   CARDIOVASCULAR:  Regular rate and rhythm, normal S1, S2.  Grade 3/6 systolic murmur at the apex.   LUNGS:  Clear bilaterally.   ABDOMEN:  Soft, nontender, nondistended.   EXTREMITIES:  Negative for edema, cyanosis or clubbing.   NEUROLOGIC:  A&O x3 with no focal deficits.      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  There is trivial TR, normal aortic valve, normal LV systolic function.  No PFO.  Coronary angiogram performed today by Dr. Fournier demonstrates no coronary artery disease.      IMPRESSION AND PLAN:  Mr. Payne is a very pleasant 64-year-old previously healthy gentleman with a newly diagnosed severe mitral valve regurgitation from what appears to be a flail P2 segment.  I reviewed the PHILLIP images, but unfortunately, not all the images are uploaded and I cannot review all the windows including the 3D imaging.  We are trying to obtain this actively.  Even with limited PHILLIP views, however, the mitral valve appears to be from a flail P2 segment and this would most likely be a repairable valve.  I explained to the patient the mitral valve pathology and the reason for recommending a mitral valve repair.  I also think that he would be an excellent minimally invasive mitral valve repair candidate via right mini thoracotomy approach.  There is a greater than 95% chance that the valve could be repaired successfully.  In the unlikely event, I did discuss prosthetic mitral valve replacement with bioprosthetic versus mechanical and explained the pros and cons of both types.  He will think about this and decide prior to surgery which prosthetic valve he would like in the very unlikely event of the valve not being able to be  repaired.  At age 64, I think we could go either way.  We will also await until he gets cleared from GI.  We will get a TAVR protocol CT scan in the interim to make sure that he is a right mini thoracotomy mitral valve repair candidate.  I explained to the patient the diagnosis in detail, the reason for recommending the operation and the potential complications associated with the surgery.  These complications include but are not strictly limited to infection, bleeding, stroke, postop MI, postop ***, pulmonary or renal complications.  I think overall he is an excellent surgical candidate and I quoted an operative mortality risk of around 1%.  The patient understands and agrees to proceed.  We will wait for the TAVR protocol CT scan, GI consult and reviewing all the PHILLIP windows and schedule for a right mini thoracotomy mitral valve repair and possible replacement in the near future.  Thank you for this referral.      cc:   Sonia Kim MD   [address] [facility] [address]   [place]  [address]         THERON ABEBE MD             D: 2020   T: 2020   MT: aldo      Name:     MUSHTAQ CLARKE   MRN:      [id number]        Account:      [id number]   :      1955           Service Date: 2020      Document: [id number]

## 2020-03-16 NOTE — PRE-PROCEDURE
GENERAL PRE-PROCEDURE:   Procedure:  Coronary angiogram  Date/Time:  3/16/2020 10:57 AM    Verbal consent obtained?: Yes    Written consent obtained?: Yes    Risks and benefits: Risks, benefits and alternatives were discussed    DC Plan: Appropriate discharge home plan in place for patients who are going home after procedure   Consent given by:  Patient  Patient states understanding of procedure being performed: Yes    Patient's understanding of procedure matches consent: Yes    Procedure consent matches procedure scheduled: Yes    Expected level of sedation:  Moderate  Appropriately NPO:  Yes  ASA Class:  Class 3- Severe systemic disease, definite functional limitations  Mallampati  :  Grade 2- soft palate, base of uvula, tonsillar pillars, and portion of posterior pharyngeal wall visible  Lungs:  Lungs clear with good breath sounds bilaterally  Heart:  Systolic murmur  History & Physical reviewed:  History and physical reviewed and no updates needed  I have examined the patient, reviewed the history, medications and pre procedural tests. He has severe MR secondary to P2 rupture and is felt to be a candidate for MV surgery.  I have explained to the patient the risks of death, MI, stroke, hematoma, possible peripheral vascular complications, arrhythmia, the use of FFR in clinical decision-making and alternative of medical therapy alone in regards to  coronary angiography. The patient voiced understanding and wishes to proceed. The patient has a good right radial pulse, normal ulnar pulse and a normal Bakari's sign.

## 2020-03-16 NOTE — LETTER
3/16/2020      RE: Kumar Payne  [address] [facility] [address]       CV Surgery      Will await GI consult. TAVR protocol CT scan, will ask all PHILLIP images to be uploaded to review.    Evelin Coronel MD    Service Date: 03/16/2020      REFERRING CARDIOLOGIST:  Sonia Kim MD      REASON FOR CONSULTATION:  Evaluation for severe mitral valve regurgitation.      HISTORY OF PRESENT ILLNESS:  Mr. Payne is a very pleasant 64-year-old previously healthy gentleman who was recently discovered to have severe mitral valve regurgitation.  His pertinent medical history dates back to several months ago when he started having unexplained GI symptoms with diffuse watery diarrhea.  This finally resolved but he does have a GI Clinic visit this week.  During a workup, he had an echocardiogram that demonstrated significant mitral valve regurgitation.  This was confirmed on PHILLIP.  The PHILLIP also demonstrated a very eccentric anteriorly directed MR with a flail P2 segment.  His LV function was preserved without any other valvular abnormalities.  Given this finding, the patient was referred to me for evaluation for mitral valve repair.  He also had a coronary angiogram performed this morning that demonstrated normal coronary arteries.      PAST MEDICAL/SURGICAL HISTORY:  Recent chronic diarrhea that is resolving, bipolar disorder, on Depakote.      ALLERGIES:  No known drug allergies.      CURRENT OUTPATIENT MEDICATIONS:   1.  Aspirin.   2.  Depakote.   3.  Ibuprofen as needed.      FAMILY HISTORY:  Noncontributory.      SOCIAL HISTORY:  He works as a .  He is a nonsmoker, does not drink alcohol either.      REVIEW OF SYSTEMS:  As per HPI.  All other 10-point review of systems are completed and were otherwise negative unless stated above.      PHYSICAL EXAMINATION:   VITAL SIGNS:  Blood pressure is 119/75, pulse is 64.  He is 102 kg, 6 feet tall, BMI of 30, BSA 2.28 m2.   GENERAL:  He appears well, in no acute distress.    HEENT:  Within normal limits.   NECK:  Supple, no lymphadenopathy.   CARDIOVASCULAR:  Regular rate and rhythm, normal S1, S2.  Grade 3/6 systolic murmur at the apex.   LUNGS:  Clear bilaterally.   ABDOMEN:  Soft, nontender, nondistended.   EXTREMITIES:  Negative for edema, cyanosis or clubbing.   NEUROLOGIC:  A&O x3 with no focal deficits.      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  There is trivial TR, normal aortic valve, normal LV systolic function.  No PFO.  Coronary angiogram performed today by Dr. Fournier demonstrates no coronary artery disease.      IMPRESSION AND PLAN:  Mr. Payne is a very pleasant 64-year-old previously healthy gentleman with a newly diagnosed severe mitral valve regurgitation from what appears to be a flail P2 segment.  I reviewed the PHILLIP images, but unfortunately, not all the images are uploaded and I cannot review all the windows including the 3D imaging.  We are trying to obtain this actively.  Even with limited PHILLIP views, however, the mitral valve appears to be from a flail P2 segment and this would most likely be a repairable valve.  I explained to the patient the mitral valve pathology and the reason for recommending a mitral valve repair.  I also think that he would be an excellent minimally invasive mitral valve repair candidate via right mini thoracotomy approach.  There is a greater than 95% chance that the valve could be repaired successfully.  In the unlikely event, I did discuss prosthetic mitral valve replacement with bioprosthetic versus mechanical and explained the pros and cons of both types.  He will think about this and decide prior to surgery which prosthetic valve he would like in the very unlikely event of the valve not being able to be repaired.  At age 64, I think we could go either way.   We will also await until he gets cleared from GI.  We will get a TAVR protocol CT scan in the interim to make sure that he is a right mini thoracotomy mitral valve repair candidate.  I explained to the patient the diagnosis in detail, the reason for recommending the operation and the potential complications associated with the surgery.  These complications include but are not strictly limited to infection, bleeding, stroke, postop MI, postop ***, pulmonary or renal complications.  I think overall he is an excellent surgical candidate and I quoted an operative mortality risk of around 1%.  The patient understands and agrees to proceed.  We will wait for the TAVR protocol CT scan, GI consult and reviewing all the PHILLIP windows and schedule for a right mini thoracotomy mitral valve repair and possible replacement in the near future.  Thank you for this referral.      cc:   Sonia Kim MD   [address]   [place]  [address]         THERON ABEBE MD             D: 2020   T: 2020   MT: aldo      Name:     MUSHTAQ CLARKE   MRN:      [id number]        Account:      [id number]   :      1955           Service Date: 2020      Document: [id number]

## 2020-03-16 NOTE — LETTER
3/16/2020      RE: Kumar Payne  [address] [place] [id number]       Dear Colleague,    Thank you for the opportunity to participate in the care of your patient, Kumar Payne, at the [facility] CARDIOTHORACIC at [facility]. Please see a copy of my visit note below.    CV Surgery    Patient seen, clinic note dictated #[id number].  Will await GI consult. TAVR protocol CT scan, will ask all PHILLIP images to be uploaded to review.    Evelin Coronel MD    Please do not hesitate to contact me if you have any questions/concerns.     Sincerely,     Evelin Coronel MD

## 2020-03-17 ENCOUNTER — OFFICE VISIT (OUTPATIENT)
Dept: GASTROENTEROLOGY | Facility: CLINIC | Age: 65
End: 2020-03-17
Payer: COMMERCIAL

## 2020-03-17 VITALS
SYSTOLIC BLOOD PRESSURE: 119 MMHG | OXYGEN SATURATION: 99 % | DIASTOLIC BLOOD PRESSURE: 75 MMHG | WEIGHT: 230.5 LBS | HEART RATE: 66 BPM | BODY MASS INDEX: 31.22 KG/M2 | HEIGHT: 72 IN | TEMPERATURE: 96.9 F

## 2020-03-17 DIAGNOSIS — R63.4 WEIGHT LOSS: ICD-10-CM

## 2020-03-17 DIAGNOSIS — K52.9 CHRONIC DIARRHEA: ICD-10-CM

## 2020-03-17 DIAGNOSIS — K52.9 CHRONIC DIARRHEA: Primary | ICD-10-CM

## 2020-03-17 LAB
ALBUMIN SERPL-MCNC: 3.8 G/DL (ref 3.4–5)
ALP SERPL-CCNC: 104 U/L (ref 40–150)
ALT SERPL W P-5'-P-CCNC: 52 U/L (ref 0–70)
AST SERPL W P-5'-P-CCNC: 26 U/L (ref 0–45)
BASOPHILS # BLD AUTO: 0.1 10E9/L (ref 0–0.2)
BASOPHILS NFR BLD AUTO: 0.7 %
BILIRUB DIRECT SERPL-MCNC: 0.2 MG/DL (ref 0–0.2)
BILIRUB SERPL-MCNC: 0.5 MG/DL (ref 0.2–1.3)
DIFFERENTIAL METHOD BLD: NORMAL
EOSINOPHIL NFR BLD AUTO: 4.1 %
ERYTHROCYTE [DISTWIDTH] IN BLOOD BY AUTOMATED COUNT: 12.9 % (ref 10–15)
HCT VFR BLD AUTO: 44 % (ref 40–53)
HGB BLD-MCNC: 15 G/DL (ref 13.3–17.7)
IMM GRANULOCYTES # BLD: 0 10E9/L (ref 0–0.4)
IMM GRANULOCYTES NFR BLD: 0.3 %
LYMPHOCYTES # BLD AUTO: 2.6 10E9/L (ref 0.8–5.3)
LYMPHOCYTES NFR BLD AUTO: 27.2 %
MCH RBC QN AUTO: 30.1 PG (ref 26.5–33)
MCHC RBC AUTO-ENTMCNC: 34.1 G/DL (ref 31.5–36.5)
MCV RBC AUTO: 88 FL (ref 78–100)
MONOCYTES # BLD AUTO: 1 10E9/L (ref 0–1.3)
MONOCYTES NFR BLD AUTO: 10.6 %
NEUTROPHILS # BLD AUTO: 5.5 10E9/L (ref 1.6–8.3)
NEUTROPHILS NFR BLD AUTO: 57.1 %
NRBC # BLD AUTO: 0 10*3/UL
NRBC BLD AUTO-RTO: 0 /100
PLATELET # BLD AUTO: 291 10E9/L (ref 150–450)
PROT SERPL-MCNC: 7 G/DL (ref 6.8–8.8)
RBC # BLD AUTO: 4.99 10E12/L (ref 4.4–5.9)
TSH SERPL DL<=0.005 MIU/L-ACNC: 0.24 MU/L (ref 0.4–4)
WBC # BLD AUTO: 9.7 10E9/L (ref 4–11)

## 2020-03-17 PROCEDURE — 83516 IMMUNOASSAY NONANTIBODY: CPT | Performed by: INTERNAL MEDICINE

## 2020-03-17 PROCEDURE — 80076 HEPATIC FUNCTION PANEL: CPT | Performed by: INTERNAL MEDICINE

## 2020-03-17 PROCEDURE — 85025 COMPLETE CBC W/AUTO DIFF WBC: CPT | Performed by: INTERNAL MEDICINE

## 2020-03-17 PROCEDURE — 84443 ASSAY THYROID STIM HORMONE: CPT | Performed by: INTERNAL MEDICINE

## 2020-03-17 PROCEDURE — 36415 COLL VENOUS BLD VENIPUNCTURE: CPT | Performed by: INTERNAL MEDICINE

## 2020-03-17 PROCEDURE — 82784 ASSAY IGA/IGD/IGG/IGM EACH: CPT | Performed by: INTERNAL MEDICINE

## 2020-03-17 PROCEDURE — 99203 OFFICE O/P NEW LOW 30 MIN: CPT | Performed by: INTERNAL MEDICINE

## 2020-03-17 ASSESSMENT — MIFFLIN-ST. JEOR: SCORE: 1863.54

## 2020-03-17 NOTE — PROGRESS NOTES
Visit Date:   03/17/2020      Consult requested by Noah Michaud regarding chronic diarrhea.      HISTORY OF PRESENT ILLNESS:  The patient is a 64-year-old man who states that he has had diarrhea since late 12/2019.  The diarrhea tends to occur whenever he eats.  Specifically, within an hour or 2 of finishing a meal he will have the need to have a liquid bowel movement.  Sometimes, the bowel movement is urgent.  He has had 1 or 2 episodes of minor fecal incontinence while asleep.  There is no significant abdominal pain with the diarrhea.  He denies melena, hematochezia, acholic stools or oily stools.  He has tried eliminating dairy from his diet.  This has not seemed to change the situation very much.  When the diarrhea first began in December, he had not traveled anywhere nor tried any new medications, either prescription or over-the-counter.  There are no pets in the home that are ill nor any other household members ill with diarrhea.      The patient reports a good appetite, but despite this has lost about 20 pounds.  He denies nausea, vomiting, heartburn or dysphagia.  He denies fevers, sweats or chills.  There is no history of jaundice or liver disease.      The patient tried Imodium without much improvement in his diarrhea.      The patient has had stool studies done over the course of the last 2 months.  Giardia and Cryptosporidium antigen was negative on 02/21, stool was negative for occult blood on 01/27, a routine stool culture on 01/23 was negative.  Ova and parasite analysis, stool culture and C. difficile testing on 1/17/2020 were all negative.  The patient has not been on any antibiotics since that C. difficile test in mid 01/2020.      Of interest, the patient's daughter does have Crohn's disease.  The patient is not aware of any family history of celiac disease.      PAST MEDICAL HISTORY:  Negative colonoscopy 11 years ago, done for screening purposes, bipolar disorder, mitral regurgitation with  cardiac valve surgery anticipated in the very near future, hyperlipidemia, tension headaches.      CURRENT MEDICATIONS:  Depakote 250 mg, 2 p.o. daily, ibuprofen 2-3 p.r.n. headaches (used infrequently), aspirin 325 mg daily.      MEDICATION ALLERGIES:  NONE.      FAMILY HISTORY:  Father  at age 73 with complications of liver disease related to alpha-1 antitrypsin deficiency.  Mother is still living at age 87.  She is a breast cancer survivor.  As mentioned, the patient's daughter has Crohn's disease.      SOCIAL HISTORY:  The patient is .  He works as a .  He served in the [employer] and was stationed in [place] as an .  The patient does not smoke or drink.  Besides the daughter with Crohn's disease, he has 2 other daughters.      PHYSICAL EXAMINATION:   GENERAL:  He is a robust, soft spoken, middle-aged man in no distress.   VITAL SIGNS:  Blood pressure 119/75, BMI 31.26.   HEENT:  Oropharynx is hydrated.  No aphthous ulcers or candidiasis.  No scleral icterus.  No cervical or supraclavicular adenopathy.   LUNGS:  Clear.   HEART:  Regular with a soft systolic murmur.     ABDOMEN:  Obese.  Bowel sounds present.  No bruits or masses.  No distention or tenderness.   EXTREMITIES:  No ankle edema.      LABORATORY DATA:  Reviewed.  Besides the stool studies already mentioned, CBC and INR yesterday were normal.  CRP in 2020 was normal at 7.7.  TSH was last checked in  and was normal.      ASSESSMENT:  Chronic diarrhea, etiology uncertain.  Differential diagnosis at this time would include inflammatory bowel disease, including microscopic colitis, celiac disease, hyperthyroidism, or irritable bowel syndrome.  Pancreatic insufficiency would also be a consideration, concurrent with his weight loss.      PLAN:   1.  Discussed differential diagnosis with the patient.   2.  Labs will be done today, including TTG with IgA level, CBC, and TSH.   3.  Colonoscopy will be scheduled at  his earliest convenience.  We discussed the technique, information to be gained, complications, etc.  He understands and is willing to proceed.  This will be scheduled accordingly.  Further recommendations will follow.         LEFTY BROWN MD             D: 2020   T: 2020   MT: SHAJI      Name:     MUSHTAQ CLARKE   MRN:      [id number]        Account:      [id number]   :      1955           Visit Date:   2020      Document: [id number]       cc: Noah Michaud PA-C

## 2020-03-18 ENCOUNTER — TELEPHONE (OUTPATIENT)
Dept: CARDIOLOGY | Facility: CLINIC | Age: 65
End: 2020-03-18

## 2020-03-18 LAB
IGA SERPL-MCNC: 207 MG/DL (ref 84–499)
INTERPRETATION ECG - MUSE: NORMAL
TTG IGA SER-ACNC: 1 U/ML
TTG IGG SER-ACNC: <1 U/ML

## 2020-03-19 ENCOUNTER — PREP FOR PROCEDURE (OUTPATIENT)
Dept: CARDIOLOGY | Facility: CLINIC | Age: 65
End: 2020-03-19

## 2020-03-19 DIAGNOSIS — I34.1 MITRAL VALVE PROLAPSE: Primary | ICD-10-CM

## 2020-03-19 NOTE — TELEPHONE ENCOUNTER
Talked to pt's wife/Janessa about scheduling pt's surgery. Scheduled surgery for 5/4. Pt is aware that things may change due to COVID-19. Will call if anything changes

## 2020-03-21 NOTE — PROGRESS NOTES
CV Surgery    Patient seen, clinic note dictated #[id number].  Will await GI consult. TAVR protocol CT scan, will ask all PHILLIP images to be uploaded to review.    Evelin Coronel MD

## 2020-03-23 NOTE — PROGRESS NOTES
Service Date: 03/16/2020      REFERRING CARDIOLOGIST:  Sonia Kim MD      REASON FOR CONSULTATION:  Evaluation for severe mitral valve regurgitation.      HISTORY OF PRESENT ILLNESS:  Mr. Payne is a very pleasant 64-year-old previously healthy gentleman who was recently discovered to have severe mitral valve regurgitation.  His pertinent medical history dates back to several months ago when he started having unexplained GI symptoms with diffuse watery diarrhea.  This finally resolved but he does have a GI Clinic visit this week.  During a workup, he had an echocardiogram that demonstrated significant mitral valve regurgitation.  This was confirmed on PHILLIP.  The PHILLIP also demonstrated a very eccentric anteriorly directed MR with a flail P2 segment.  His LV function was preserved without any other valvular abnormalities.  Given this finding, the patient was referred to me for evaluation for mitral valve repair.  He also had a coronary angiogram performed this morning that demonstrated normal coronary arteries.      PAST MEDICAL/SURGICAL HISTORY:  Recent chronic diarrhea that is resolving, bipolar disorder, on Depakote.      ALLERGIES:  No known drug allergies.      CURRENT OUTPATIENT MEDICATIONS:   1.  Aspirin.   2.  Depakote.   3.  Ibuprofen as needed.      FAMILY HISTORY:  Noncontributory.      SOCIAL HISTORY:  He works as a .  He is a nonsmoker, does not drink alcohol either.      REVIEW OF SYSTEMS:  As per HPI.  All other 10-point review of systems are completed and were otherwise negative unless stated above.      PHYSICAL EXAMINATION:   VITAL SIGNS:  Blood pressure is 119/75, pulse is 64.  He is 102 kg, 6 feet tall, BMI of 30, BSA 2.28 m2.   GENERAL:  He appears well, in no acute distress.   HEENT:  Within normal limits.   NECK:  Supple, no lymphadenopathy.   CARDIOVASCULAR:  Regular rate and rhythm, normal S1, S2.  Grade 3/6 systolic murmur at the apex.   LUNGS:  Clear bilaterally.   ABDOMEN:   Soft, nontender, nondistended.   EXTREMITIES:  Negative for edema, cyanosis or clubbing.   NEUROLOGIC:  A&O x3 with no focal deficits.      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  There is trivial TR, normal aortic valve, normal LV systolic function.  No PFO.  Coronary angiogram performed today by Dr. Fournier demonstrates no coronary artery disease.      IMPRESSION AND PLAN:  Mr. Payne is a very pleasant 64-year-old previously healthy gentleman with a newly diagnosed severe mitral valve regurgitation from what appears to be a flail P2 segment.  I reviewed the PHILLIP images, but unfortunately, not all the images are uploaded and I cannot review all the windows including the 3D imaging.  We are trying to obtain this actively.  Even with limited PHILLIP views, however, the mitral valve appears to be from a flail P2 segment and this would most likely be a repairable valve.  I explained to the patient the mitral valve pathology and the reason for recommending a mitral valve repair.  I also think that he would be an excellent minimally invasive mitral valve repair candidate via right mini thoracotomy approach.  There is a greater than 95% chance that the valve could be repaired successfully.  In the unlikely event, I did discuss prosthetic mitral valve replacement with bioprosthetic versus mechanical and explained the pros and cons of both types.  He will think about this and decide prior to surgery which prosthetic valve he would like in the very unlikely event of the valve not being able to be repaired.  At age 64, I think we could go either way.  We will also await until he gets cleared from GI.  We will get a TAVR protocol CT scan in the interim to make sure that he is a right mini thoracotomy mitral valve repair candidate.  I explained to the patient  the diagnosis in detail, the reason for recommending the operation and the potential complications associated with the surgery.  These complications include but are not strictly limited to infection, bleeding, stroke, postop MI, postop arrhythmias, pulmonary or renal complications.  I think overall he is an excellent surgical candidate and I quoted an operative mortality risk of around 1%.  The patient understands and agrees to proceed.  We will wait for the TAVR protocol CT scan, GI consult and reviewing all the PHILLIP windows and schedule for a right mini thoracotomy mitral valve repair and possible replacement in the near future.  Thank you for this referral.      cc:   Sonia Kim MD   [facility]   [address] [place] [address] [facility] [address]   [place]  [address]         THERON ABEBE MD             D: 2020   T: 2020   MT: aldo      Name:     MUSHTAQ CLARKE   MRN:      [id number]        Account:      [id number]   :      1955           Service Date: 2020      Document: [id number]

## 2020-03-25 ENCOUNTER — VIRTUAL VISIT (OUTPATIENT)
Dept: CARDIOLOGY | Facility: CLINIC | Age: 65
End: 2020-03-25
Payer: COMMERCIAL

## 2020-03-25 DIAGNOSIS — I34.1 MITRAL VALVE PROLAPSE: Primary | ICD-10-CM

## 2020-03-25 PROCEDURE — 99213 OFFICE O/P EST LOW 20 MIN: CPT | Mod: 95 | Performed by: NURSE PRACTITIONER

## 2020-03-25 NOTE — PROGRESS NOTES
"Kumar Payne is a 64 year old male who is being evaluated via a billable telephone visit.      The patient has been notified of following:     \"This telephone visit will be conducted via a call between you and your physician/provider. We have found that certain health care needs can be provided without the need for a physical exam.  This service lets us provide the care you need with a short phone conversation.  If a prescription is necessary we can send it directly to your pharmacy.  If lab work is needed we can place an order for that and you can then stop by our lab to have the test done at a later time.    If during the course of the call the physician/provider feels a telephone visit is not appropriate, you will not be charged for this service.\"     Kumar Payne complains of    Chief Complaint   Patient presents with     Follow Up       I have reviewed and updated the patient's Past Medical History, Social History, Family History and Medication List.    ALLERGIES  Patient has no known allergies.        "

## 2020-03-25 NOTE — PROGRESS NOTES
"Kumar Payne is a 64 year old male who is being evaluated via a billable telephone visit.      The patient has been notified of following:     \"This telephone visit will be conducted via a call between you and your physician/provider. We have found that certain health care needs can be provided without the need for a physical exam.  This service lets us provide the care you need with a short phone conversation.  If a prescription is necessary we can send it directly to your pharmacy.  If lab work is needed we can place an order for that and you can then stop by our lab to have the test done at a later time.    If during the course of the call the physician/provider feels a telephone visit is not appropriate, you will not be charged for this service.\"     Kumar Payne complains of    Chief Complaint   Patient presents with     Follow Up       I have reviewed and updated the patient's Past Medical History, Social History, Family History and Medication List.    ALLERGIES  Patient has no known allergies.      Reason for visit: Post coronary angiogram    Primary cardiologist: Dr. Kim    History of presenting illness:    Kumar Payne, a pleasant 64 year old patient who has a past medical history significant for mitral valve regurgitation.     Several months ago he had unexplained GI symptoms and diffuse watery diarrhea.  During his work-up he was noted to have a murmur and underwent a echocardiogram that demonstrated a very eccentric anteriorly directed MR with fail P2 segment.  His LV function is preserved and there are no other valvular abnormalities.  He underwent a coronary angiogram that demonstrated normal coronaries.  He was evaluated by Dr. Coronel and the recommendation was that he undergo a CT TAVR to guide the choice of a minimally invasive mitral valve repair versus a mitral valve replacement. The patient was also evaluated by GI and the plan is to undergo a colonoscopy that is not yet " "scheduled.    Today he states that his right radial access site from his coronary angiogram is clean dry and intact with minor bruising and tenderness.  He denies any chest discomfort or shortness of breath on exertio.  He does endorse intermittent palpitations that have been but he notes have been \"sporadic\", but occur approximately once a week while at rest.  He does not have associated lightheadedness.  He does report that he has not had diarrhea in approximately 1 week.         Assessment and Plan:     ASSESSMENT:    1. Mitral valve regurgitation, severe    Awaiting CT TAVR to determine whether he has a minimally invasive mitral valve repair versus a mitral valve replacement candidate    Asymptomatic    Preserved LVEF    2. Intermittent palpitations    Occur approximately once a week and not associated with any other symptoms    3. Diffuse diarrhea    No symptoms in the last week    He was evaluated by GI and the plan is for him to undergo a colonoscopy    PLAN:     1. Proceed with mitral valve surgery depending on GI work-up  2. 7-day ZIO Patch for evaluation of palpitations     MARGIE Flores, CNP      Phone call duration:  8 minutes      "

## 2020-03-26 ENCOUNTER — TELEPHONE (OUTPATIENT)
Dept: OTHER | Facility: CLINIC | Age: 65
End: 2020-03-26

## 2020-03-26 NOTE — TELEPHONE ENCOUNTER
Updated patient on need for CTA prior to Minimally Invasive Mitral Valve Replacement. Will schedule pre op imaging and labs prior to 5/4 surgery when Covid 19 restrictions lifted. Patient states understanding of plan.

## 2020-03-27 ENCOUNTER — TELEPHONE (OUTPATIENT)
Dept: GASTROENTEROLOGY | Facility: CLINIC | Age: 65
End: 2020-03-27

## 2020-03-27 DIAGNOSIS — R00.2 PALPITATIONS: Primary | ICD-10-CM

## 2020-03-27 NOTE — LETTER

## 2020-03-27 NOTE — TELEPHONE ENCOUNTER
Kumar called stating he was told to have his colonoscopy prior to his scheduled 5/5 heart  surgery.  Is this scope urgent?    Please let Giulia or I know- we will call patient to inform

## 2020-03-30 PROBLEM — K52.9 CHRONIC DIARRHEA: Status: ACTIVE | Noted: 2020-03-30

## 2020-03-30 NOTE — TELEPHONE ENCOUNTER
Per Viv, schedule 4/1, or sometime before May surgery.        Date of colonoscopy/EGD: 4/1  Surgeon: Dr. Nam  Prep:Miralax  Packet:Colonoscopy/EGD instructions mailed to patient's home address.   Date: 3/30/2020    Per pt, has not taken [facility]    Surgery Scheduler

## 2020-03-31 ENCOUNTER — HOSPITAL ENCOUNTER (OUTPATIENT)
Dept: CARDIOLOGY | Facility: CLINIC | Age: 65
Discharge: HOME | End: 2020-03-31
Attending: NURSE PRACTITIONER | Admitting: NURSE PRACTITIONER
Payer: COMMERCIAL

## 2020-03-31 DIAGNOSIS — R00.2 PALPITATIONS: ICD-10-CM

## 2020-03-31 PROCEDURE — 0298T ZZC EXT ECG > 48HR TO 21 DAY REVIEW AND INTERPRETATN: CPT | Performed by: INTERNAL MEDICINE

## 2020-03-31 PROCEDURE — 0296T ZIO PATCH HOLTER ADULT PEDIATRIC GREATER THAN 48 HRS: CPT

## 2020-04-01 ENCOUNTER — SURGERY (OUTPATIENT)
Age: 65
End: 2020-04-01
Payer: COMMERCIAL

## 2020-04-01 ENCOUNTER — HOSPITAL ENCOUNTER (OUTPATIENT)
Facility: CLINIC | Age: 65
Discharge: HOME | End: 2020-04-01
Attending: INTERNAL MEDICINE | Admitting: INTERNAL MEDICINE
Payer: COMMERCIAL

## 2020-04-01 VITALS
WEIGHT: 230 LBS | TEMPERATURE: 97.7 F | HEART RATE: 68 BPM | HEIGHT: 72 IN | DIASTOLIC BLOOD PRESSURE: 76 MMHG | OXYGEN SATURATION: 95 % | BODY MASS INDEX: 31.15 KG/M2 | SYSTOLIC BLOOD PRESSURE: 126 MMHG | RESPIRATION RATE: 16 BRPM

## 2020-04-01 DIAGNOSIS — K52.9 CHRONIC DIARRHEA: ICD-10-CM

## 2020-04-01 LAB — COLONOSCOPY: NORMAL

## 2020-04-01 PROCEDURE — G0500 MOD SEDAT ENDO SERVICE >5YRS: HCPCS | Performed by: INTERNAL MEDICINE

## 2020-04-01 PROCEDURE — 88305 TISSUE EXAM BY PATHOLOGIST: CPT | Mod: 26 | Performed by: INTERNAL MEDICINE

## 2020-04-01 PROCEDURE — 45378 DIAGNOSTIC COLONOSCOPY: CPT | Performed by: INTERNAL MEDICINE

## 2020-04-01 PROCEDURE — 25000128 H RX IP 250 OP 636: Performed by: INTERNAL MEDICINE

## 2020-04-01 PROCEDURE — 45380 COLONOSCOPY AND BIOPSY: CPT | Performed by: INTERNAL MEDICINE

## 2020-04-01 PROCEDURE — 88305 TISSUE EXAM BY PATHOLOGIST: CPT | Performed by: INTERNAL MEDICINE

## 2020-04-01 RX ORDER — LIDOCAINE 40 MG/G
CREAM TOPICAL
Status: DISCONTINUED | OUTPATIENT
Start: 2020-04-01 | End: 2020-04-01 | Stop reason: HOSPADM

## 2020-04-01 RX ORDER — ONDANSETRON 2 MG/ML
4 INJECTION INTRAMUSCULAR; INTRAVENOUS EVERY 6 HOURS PRN
Status: DISCONTINUED | OUTPATIENT
Start: 2020-04-01 | End: 2020-04-01 | Stop reason: HOSPADM

## 2020-04-01 RX ORDER — ONDANSETRON 4 MG/1
4 TABLET, ORALLY DISINTEGRATING ORAL EVERY 6 HOURS PRN
Status: DISCONTINUED | OUTPATIENT
Start: 2020-04-01 | End: 2020-04-01 | Stop reason: HOSPADM

## 2020-04-01 RX ORDER — FLUMAZENIL 0.1 MG/ML
0.2 INJECTION, SOLUTION INTRAVENOUS
Status: DISCONTINUED | OUTPATIENT
Start: 2020-04-01 | End: 2020-04-01 | Stop reason: HOSPADM

## 2020-04-01 RX ORDER — NALOXONE HYDROCHLORIDE 0.4 MG/ML
.1-.4 INJECTION, SOLUTION INTRAMUSCULAR; INTRAVENOUS; SUBCUTANEOUS
Status: DISCONTINUED | OUTPATIENT
Start: 2020-04-01 | End: 2020-04-01 | Stop reason: HOSPADM

## 2020-04-01 RX ORDER — FENTANYL CITRATE 50 UG/ML
INJECTION, SOLUTION INTRAMUSCULAR; INTRAVENOUS PRN
Status: DISCONTINUED | OUTPATIENT
Start: 2020-04-01 | End: 2020-04-01 | Stop reason: HOSPADM

## 2020-04-01 RX ORDER — ONDANSETRON 2 MG/ML
4 INJECTION INTRAMUSCULAR; INTRAVENOUS
Status: DISCONTINUED | OUTPATIENT
Start: 2020-04-01 | End: 2020-04-01 | Stop reason: HOSPADM

## 2020-04-01 RX ADMIN — MIDAZOLAM 2 MG: 1 INJECTION INTRAMUSCULAR; INTRAVENOUS at 09:15

## 2020-04-01 RX ADMIN — FENTANYL CITRATE 50 MCG: 50 INJECTION, SOLUTION INTRAMUSCULAR; INTRAVENOUS at 09:15

## 2020-04-01 RX ADMIN — MIDAZOLAM 1 MG: 1 INJECTION INTRAMUSCULAR; INTRAVENOUS at 09:17

## 2020-04-01 RX ADMIN — MIDAZOLAM 1 MG: 1 INJECTION INTRAMUSCULAR; INTRAVENOUS at 09:16

## 2020-04-01 RX ADMIN — MIDAZOLAM 1 MG: 1 INJECTION INTRAMUSCULAR; INTRAVENOUS at 09:23

## 2020-04-01 RX ADMIN — FENTANYL CITRATE 25 MCG: 50 INJECTION, SOLUTION INTRAMUSCULAR; INTRAVENOUS at 09:23

## 2020-04-01 ASSESSMENT — MIFFLIN-ST. JEOR: SCORE: 1861.27

## 2020-04-01 NOTE — H&P
[facility] GI Pre-Procedure Physical Assessment    Kumar Payne MRN# [id number]   Age: 64 year old YOB: 1955      Date of Surgery: 4/1/2020  Location [facility]      Date of Exam 4/1/2020 Facility (Same day)       Home clinic: [facility]  Primary care provider: Noah Rangel         Active problem list:   Patient Active Problem List   Diagnosis     Bipolar disorder (H)     Hyperlipidemia LDL goal <100     Heart valve disease     Status post coronary angiogram     Mitral valve prolapse     Chronic diarrhea            Medications (include herbals and vitamins):   Any Plavix use in the last 7 days?  No     Current Facility-Administered Medications   Medication     lidocaine (LMX4) kit     lidocaine 1 % 0.1-1 mL     ondansetron (ZOFRAN) injection 4 mg     sodium chloride (PF) 0.9% PF flush 3 mL     sodium chloride (PF) 0.9% PF flush 3 mL             Allergies:    No Known Allergies  Allergy to Latex?  No  Allergy to tape?    No          Social History:     Social History     Tobacco Use     Smoking status: Never Smoker     Smokeless tobacco: Never Used   Substance Use Topics     Alcohol use: Yes     Alcohol/week: 0.0 standard drinks     Comment: 1-2 times a year            Physical Exam:   All vitals have been reviewed  Blood pressure 136/80, temperature 97.7  F (36.5  C), temperature source Oral, resp. rate 16, height 1.829 m (6'), weight 104.3 kg (230 lb), SpO2 99 %.  Airway assessment:   Patient is able to open mouth wide  Patient is able to stick out tongue      Lungs:   No increased work of breathing, good air exchange, clear to auscultation bilaterally, no crackles or wheezing      Cardiovascular:   Normal apical impulse, regular rate and rhythm, normal S1 and S2, no S3 or S4, and no murmur noted           Lab / Radiology Results:   All laboratory data reviewed          Assessment:   Appropriately NPO  Chief complaint or anatomic assessment of involved area:  chronic diarrhea         Plan:   Moderate (conscious) sedation     Patient's active problems diagnostically and therapeutically optimized for the planned procedure  Risks, benefits, alternatives to sedation and blood explained and consent obtained  Risks, benefits, alternatives to procedure explained and consent obtained  Orders and progress notes are in the chart  Discharge from Phase 1 and / or Phase 2 recovery when patient meets criteria    I have reviewed the history and physical, lab finding(s), diagnostic data, medicaitons, and the plan for sedation.  I have determined this patient to be an appropriate candidate for the planned sedation / procedure and have reassessed the patient immediately prior to sedation / procedure.    I have personally and medically directed the administration of medications used.    Sebastián Nam MD

## 2020-04-01 NOTE — DISCHARGE INSTRUCTIONS
[facility]    Home Care Following Endoscopy    Dr. Nam      Activity:    You have just undergone an endoscopic procedure usually performed with conscious sedation.  Do not work or operate machinery (including a car) for at least 12 hours.      I encourage you to walk and attempt to pass this air as soon as possible.    Diet:    Return to the diet you were on before your procedure but eat lightly for the first 12-24 hours.    Drink plenty of water.    Resume any regular medications unless otherwise advised by your physician.  Please begin any new medication prescribed as a result of your procedure as directed by your physician.     If you had any biopsy or polyp removed please refrain from aspirin or aspirin products for 2 days.  If on Coumadin please restart as instructed by your physician.   Pain:    You may take Tylenol as needed for pain.  Expected Recovery:    You can expect some mild abdominal fullness and/or discomfort due to the air used to inflate your intestinal tract. It is also normal to have a mild sore throat after upper endoscopy.    Call Your Physician if You Have:    After Colonoscopy:  o Worsening persisting abdominal pain which is worse with activity.  o Fevers (>101 degrees F), chills or shakes.  o Passage of continued blood with bowel movements.   Any questions or concerns about your recovery, please call [phone] or after hours [phone] Nurse Advice Line.    Follow-up Care:    You should receive a call or letter with your results within 1 week. Please call if you have not received a notification of your results.  If asked to return to clinic please make an appointment 1 week after your procedure.  Call [phone].

## 2020-04-03 LAB — COPATH REPORT: NORMAL

## 2020-04-23 ENCOUNTER — TELEPHONE (OUTPATIENT)
Dept: OTHER | Facility: CLINIC | Age: 65
End: 2020-04-23

## 2020-04-23 NOTE — TELEPHONE ENCOUNTER
Pre op imaging and labs scheduled. Pre op instructions reviewed over phone due to Covid 19 restrictions. Plan minimally invasive Mitral valve repair/ replacement 5/4. All questions addressed.

## 2020-04-27 ENCOUNTER — TELEPHONE (OUTPATIENT)
Dept: CARDIOLOGY | Facility: CLINIC | Age: 65
End: 2020-04-27

## 2020-04-28 ENCOUNTER — HOSPITAL ENCOUNTER (OUTPATIENT)
Dept: LAB | Facility: CLINIC | Age: 65
End: 2020-04-28
Attending: SURGERY
Payer: COMMERCIAL

## 2020-04-28 ENCOUNTER — HOSPITAL ENCOUNTER (OUTPATIENT)
Dept: GENERAL RADIOLOGY | Facility: CLINIC | Age: 65
End: 2020-04-28
Attending: SURGERY
Payer: COMMERCIAL

## 2020-04-28 ENCOUNTER — VIRTUAL VISIT (OUTPATIENT)
Dept: CARDIOLOGY | Facility: CLINIC | Age: 65
End: 2020-04-28
Payer: COMMERCIAL

## 2020-04-28 ENCOUNTER — HOSPITAL ENCOUNTER (OUTPATIENT)
Dept: CARDIOLOGY | Facility: CLINIC | Age: 65
End: 2020-04-28
Attending: SURGERY
Payer: COMMERCIAL

## 2020-04-28 VITALS — WEIGHT: 225 LBS | BODY MASS INDEX: 30.52 KG/M2

## 2020-04-28 DIAGNOSIS — I38 HEART VALVE DISEASE: ICD-10-CM

## 2020-04-28 DIAGNOSIS — I34.1 MITRAL VALVE PROLAPSE: Primary | ICD-10-CM

## 2020-04-28 DIAGNOSIS — R00.2 PALPITATIONS: ICD-10-CM

## 2020-04-28 LAB
ALBUMIN SERPL-MCNC: 3.8 G/DL (ref 3.4–5)
ALBUMIN UR-MCNC: NEGATIVE MG/DL
ALP SERPL-CCNC: 85 U/L (ref 40–150)
ALT SERPL W P-5'-P-CCNC: 36 U/L (ref 0–70)
ANION GAP SERPL CALCULATED.3IONS-SCNC: 7 MMOL/L (ref 3–14)
APPEARANCE UR: CLEAR
AST SERPL W P-5'-P-CCNC: 20 U/L (ref 0–45)
BILIRUB SERPL-MCNC: 0.5 MG/DL (ref 0.2–1.3)
BILIRUB UR QL STRIP: NEGATIVE
BUN SERPL-MCNC: 12 MG/DL (ref 7–30)
CALCIUM SERPL-MCNC: 8.7 MG/DL (ref 8.5–10.1)
CHLORIDE SERPL-SCNC: 111 MMOL/L (ref 94–109)
CO2 SERPL-SCNC: 23 MMOL/L (ref 20–32)
COLOR UR AUTO: YELLOW
CREAT SERPL-MCNC: 0.85 MG/DL (ref 0.66–1.25)
ERYTHROCYTE [DISTWIDTH] IN BLOOD BY AUTOMATED COUNT: 13.1 % (ref 10–15)
GFR SERPL CREATININE-BSD FRML MDRD: >90 ML/MIN/{1.73_M2}
GLUCOSE SERPL-MCNC: 84 MG/DL (ref 70–99)
GLUCOSE UR STRIP-MCNC: NEGATIVE MG/DL
HBA1C MFR BLD: 5.5 % (ref 0–5.6)
HCT VFR BLD AUTO: 42.9 % (ref 40–53)
HGB BLD-MCNC: 14.3 G/DL (ref 13.3–17.7)
HGB UR QL STRIP: NEGATIVE
KETONES UR STRIP-MCNC: NEGATIVE MG/DL
LEUKOCYTE ESTERASE UR QL STRIP: NEGATIVE
MCH RBC QN AUTO: 29.9 PG (ref 26.5–33)
MCHC RBC AUTO-ENTMCNC: 33.3 G/DL (ref 31.5–36.5)
MCV RBC AUTO: 90 FL (ref 78–100)
NITRATE UR QL: NEGATIVE
PH UR STRIP: 7 PH (ref 5–7)
PLATELET # BLD AUTO: 268 10E9/L (ref 150–450)
POTASSIUM SERPL-SCNC: 4.1 MMOL/L (ref 3.4–5.3)
PROT SERPL-MCNC: 6.9 G/DL (ref 6.8–8.8)
RBC # BLD AUTO: 4.79 10E12/L (ref 4.4–5.9)
SODIUM SERPL-SCNC: 141 MMOL/L (ref 133–144)
SOURCE: ABNORMAL
SP GR UR STRIP: >1.035 (ref 1–1.03)
UROBILINOGEN UR STRIP-MCNC: NORMAL MG/DL (ref 0–2)
WBC # BLD AUTO: 8.3 10E9/L (ref 4–11)

## 2020-04-28 PROCEDURE — 25000128 H RX IP 250 OP 636: Performed by: SURGERY

## 2020-04-28 PROCEDURE — 99213 OFFICE O/P EST LOW 20 MIN: CPT | Mod: 95 | Performed by: NURSE PRACTITIONER

## 2020-04-28 PROCEDURE — 86923 COMPATIBILITY TEST ELECTRIC: CPT | Performed by: SURGERY

## 2020-04-28 PROCEDURE — 86850 RBC ANTIBODY SCREEN: CPT | Performed by: SURGERY

## 2020-04-28 PROCEDURE — 85027 COMPLETE CBC AUTOMATED: CPT | Performed by: SURGERY

## 2020-04-28 PROCEDURE — 81003 URINALYSIS AUTO W/O SCOPE: CPT | Performed by: SURGERY

## 2020-04-28 PROCEDURE — 71275 CT ANGIOGRAPHY CHEST: CPT | Mod: 26 | Performed by: INTERNAL MEDICINE

## 2020-04-28 PROCEDURE — 80053 COMPREHEN METABOLIC PANEL: CPT | Performed by: SURGERY

## 2020-04-28 PROCEDURE — 83036 HEMOGLOBIN GLYCOSYLATED A1C: CPT | Performed by: SURGERY

## 2020-04-28 PROCEDURE — 71275 CT ANGIOGRAPHY CHEST: CPT

## 2020-04-28 PROCEDURE — 36415 COLL VENOUS BLD VENIPUNCTURE: CPT | Performed by: SURGERY

## 2020-04-28 PROCEDURE — 86900 BLOOD TYPING SEROLOGIC ABO: CPT | Performed by: SURGERY

## 2020-04-28 PROCEDURE — 86901 BLOOD TYPING SEROLOGIC RH(D): CPT | Performed by: SURGERY

## 2020-04-28 PROCEDURE — 74174 CTA ABD&PLVS W/CONTRAST: CPT | Mod: 26 | Performed by: INTERNAL MEDICINE

## 2020-04-28 PROCEDURE — 71046 X-RAY EXAM CHEST 2 VIEWS: CPT

## 2020-04-28 RX ORDER — ONDANSETRON 2 MG/ML
4 INJECTION INTRAMUSCULAR; INTRAVENOUS
Status: DISCONTINUED | OUTPATIENT
Start: 2020-04-28 | End: 2020-04-29 | Stop reason: HOSPADM

## 2020-04-28 RX ORDER — DIPHENHYDRAMINE HCL 25 MG
25 CAPSULE ORAL
Status: DISCONTINUED | OUTPATIENT
Start: 2020-04-28 | End: 2020-04-29 | Stop reason: HOSPADM

## 2020-04-28 RX ORDER — IOPAMIDOL 755 MG/ML
500 INJECTION, SOLUTION INTRAVASCULAR ONCE
Status: COMPLETED | OUTPATIENT
Start: 2020-04-28 | End: 2020-04-28

## 2020-04-28 RX ORDER — ACYCLOVIR 200 MG/1
0-1 CAPSULE ORAL
Status: DISCONTINUED | OUTPATIENT
Start: 2020-04-28 | End: 2020-04-29 | Stop reason: HOSPADM

## 2020-04-28 RX ORDER — DIPHENHYDRAMINE HYDROCHLORIDE 50 MG/ML
25-50 INJECTION INTRAMUSCULAR; INTRAVENOUS
Status: DISCONTINUED | OUTPATIENT
Start: 2020-04-28 | End: 2020-04-29 | Stop reason: HOSPADM

## 2020-04-28 RX ORDER — METHYLPREDNISOLONE SODIUM SUCCINATE 125 MG/2ML
125 INJECTION, POWDER, LYOPHILIZED, FOR SOLUTION INTRAMUSCULAR; INTRAVENOUS
Status: DISCONTINUED | OUTPATIENT
Start: 2020-04-28 | End: 2020-04-29 | Stop reason: HOSPADM

## 2020-04-28 RX ADMIN — IOPAMIDOL 100 ML: 755 INJECTION, SOLUTION INTRAVENOUS at 13:43

## 2020-04-28 NOTE — PROGRESS NOTES
"Kumar Payne is a 64 year old male who is being evaluated via a billable telephone visit.      The patient has been notified of following:     \"This telephone visit will be conducted via a call between you and your physician/provider. We have found that certain health care needs can be provided without the need for a physical exam.  This service lets us provide the care you need with a short phone conversation.  If a prescription is necessary we can send it directly to your pharmacy.  If lab work is needed we can place an order for that and you can then stop by our lab to have the test done at a later time.    If during the course of the call the physician/provider feels a telephone visit is not appropriate, you will not be charged for this service.\"     Physician has received verbal consent for a Telephone Visit from the patient? Yes    Blood pressure: no blood pressure cuff at home   Pulse:  Weight: 225 #      Kumar Payne complains of  No chief complaint on file.      I have reviewed and updated the patient's Past Medical History, Social History, Family History and Medication List.    ALLERGIES  Patient has no known allergies.    Reason for visit: Follow up STACEY Parisi    Primary cardiologist: Dr. Kim    History of presenting illness:    Kumar Payne, a pleasant 64 year old patient who has a past medical history significant for mitral valve regurgitation.      Several months ago he had unexplained GI symptoms and diffuse watery diarrhea.  During his work-up he was noted to have a murmur and underwent a echocardiogram that demonstrated a very eccentric anteriorly directed MR with fail P2 segment.  His LV function is preserved and there are no other valvular abnormalities.  He underwent a coronary angiogram that demonstrated normal coronaries.  He was evaluated by Dr. Coronel and the recommendation was that he undergo a CT TAVR to guide the choice of a minimally invasive mitral valve repair versus a mitral " "valve replacement. The patient was also evaluated by GI and he also has undergone a GI evaluation and was found to have a normal colonoscopy.  His symptoms have since resolved.    At the post procedural angiogram visit 1 month ago the patient noted intermittent palpitations that were \"sporadic\" and occurred approximately once a week at rest.  He underwent a ZIO Patch heart monitor.  The monitor revealed 19 runs of SVT with the longest lasting 13 seconds at a max heart rate of 169 bpm with rare PVCs and PACs.     Today he reports that his symptoms have slightly increased in frequency, but are overall stable.  He does not have any associated lightheadedness, dizziness, chest discomfort or shortness of breath.  His heart rate does run on the low end of normal in the 60s and occasion.  Therefore, I am hesitant to initiate any beta blockade as it may cause intra-or postoperative bradycardia.         Assessment and Plan:     ASSESSMENT:    1. Mitral valve regurgitation, severe    Scheduled for surgery on Monday, 5/4/2020 with Dr. Coronel    Preoperative imaging was scheduled earlier today results pending    Continues to be asymptomatic    Preserved LVEF    2. Intermittent palpitations    Found to be SVT on recent ZIO Patch with 19 runs and rare PVCs and PACs    Symptoms are stable and if has recurrence postoperatively could not initiate low-dose beta-blocker.    PLAN:     1. Follow-up postoperatively as dictated by CV surgery       MARGIE Flores, CNP    Phone call duration: 5 minutes      "

## 2020-04-29 ENCOUNTER — TELEPHONE (OUTPATIENT)
Dept: FAMILY MEDICINE | Facility: OTHER | Age: 65
End: 2020-04-29

## 2020-04-29 ENCOUNTER — OFFICE VISIT (OUTPATIENT)
Dept: FAMILY MEDICINE | Facility: CLINIC | Age: 65
End: 2020-04-29
Payer: COMMERCIAL

## 2020-04-29 VITALS
HEART RATE: 68 BPM | WEIGHT: 230.2 LBS | HEIGHT: 71 IN | OXYGEN SATURATION: 96 % | TEMPERATURE: 97 F | SYSTOLIC BLOOD PRESSURE: 116 MMHG | BODY MASS INDEX: 32.23 KG/M2 | RESPIRATION RATE: 20 BRPM | DIASTOLIC BLOOD PRESSURE: 68 MMHG

## 2020-04-29 DIAGNOSIS — Z01.818 PRE-OP EXAM: Primary | ICD-10-CM

## 2020-04-29 DIAGNOSIS — Z11.59 ENCOUNTER FOR SCREENING FOR OTHER VIRAL DISEASES: Primary | ICD-10-CM

## 2020-04-29 DIAGNOSIS — I38 HEART VALVE DISEASE: ICD-10-CM

## 2020-04-29 PROCEDURE — 99214 OFFICE O/P EST MOD 30 MIN: CPT | Performed by: PHYSICIAN ASSISTANT

## 2020-04-29 RX ORDER — ACETAMINOPHEN 325 MG/1
325 TABLET ORAL 4 TIMES DAILY PRN
Status: ON HOLD | COMMUNITY
End: 2020-05-09

## 2020-04-29 ASSESSMENT — MIFFLIN-ST. JEOR: SCORE: 1846.31

## 2020-04-29 ASSESSMENT — PAIN SCALES - GENERAL: PAINLEVEL: MILD PAIN (2)

## 2020-04-29 NOTE — PATIENT INSTRUCTIONS
Before Your Surgery      Call your surgeon if there is any change in your health. This includes signs of a cold or flu (such as a sore throat, runny nose, cough, rash or fever).    Do not smoke, drink alcohol or take over the counter medicine (unless your surgeon or primary care doctor tells you to) for the 24 hours before and after surgery.  o No smoking 2 weeks prior and 2 weeks after surgery to improve healing  o No alcohol 24 hours prior to surgery to reduce bleed risk  o No NSAIDs (ibuprofen, advil, motrin, aleve, naproxen, aspirin) 7 days prior to surgery to reduce bleed risk    If you take prescribed drugs: Follow your doctor s orders about which medicines to take and which to stop until after surgery.  o No medications day of surgery    Eating and drinking prior to surgery: follow the instructions from your surgeon  o Fast 8-12 hours prior    Take a shower or bath the night before surgery. Use the soap your surgeon gave you to gently clean your skin. If you do not have soap from your surgeon, use your regular soap. Do not shave or scrub the surgery site.  Wear clean pajamas and have clean sheets on your bed.

## 2020-04-29 NOTE — NURSING NOTE
Health Maintenance Due   Topic Date Due     HEPATITIS C SCREENING  1955     ADVANCE CARE PLANNING  1955     DTAP/TDAP/TD IMMUNIZATION (1 - Tdap) 09/12/1978     LIPID  09/12/1988     ZOSTER IMMUNIZATION (1 of 2) 09/12/2003     MEDICARE ANNUAL WELLNESS VISIT  09/12/2018     FALL RISK ASSESSMENT  09/12/2018     PNEUMOCOCCAL IMMUNIZATION 65+ LOW/MEDIUM RISK (1 of 2 - PCV13) 09/12/2018     AORTIC ANEURYSM SCREENING (SYSTEM ASSIGNED)  09/12/2018     INFLUENZA VACCINE (1) 09/01/2019     PHQ-2  01/01/2020     Mily MASON LPN

## 2020-04-29 NOTE — PROGRESS NOTES
[address] [address]  [phone]  Dept: [phone]    PRE-OP EVALUATION:  Today's date: 2020    Kumar Payne (: 1955) presents for pre-operative evaluation assessment as requested by Dr. Coronel.  He requires evaluation and anesthesia risk assessment prior to undergoing surgery/procedure for treatment of Heart problem .    Fax number for surgical facility:   Primary Physician: Noah Rangel  Type of Anesthesia Anticipated: General    Patient has a Health Care Directive or Living Will:  NO    Preop Questions 2020   Who is doing your surgery? Dr Coronel   What are you having done? MINIMALLY INVASIVE MITRAL VALVE REPAIR POSSIBLE REPLACEMENT   Date of Surgery/Procedure: May 4 2020   Facility or Hospital where procedure/surgery will be performed: [facility]   1.  Do you have a history of Heart attack, stroke, stent, coronary bypass surgery, or other heart surgery? No   2.  Do you ever have any pain or discomfort in your chest? YES - mild chest pain   3.  Do you have a history of  Heart Failure? No   4.   Are you troubled by shortness of breath when:  walking on a level surface, or up a slight hill, or at night? No   5.  Do you currently have a cold, bronchitis or other respiratory infection? No   6.  Do you have a cough, shortness of breath, or wheezing? No   7.  Do you sometimes get pains in the calves of your legs when you walk? No   8. Do you or anyone in your family have previous history of blood clots? No   9.  Do you or does anyone in your family have a serious bleeding problem such as prolonged bleeding following surgeries or cuts? No   10. Have you ever had problems with anemia or been told to take iron pills? No   11. Have you had any abnormal blood loss such as black, tarry or bloody stools? No   12. Have you ever had a blood transfusion? No   13. Have you or any of your relatives ever had problems with anesthesia? No   14. Do you have  sleep apnea, excessive snoring or daytime drowsiness? No   15. Do you have any prosthetic heart valves? No   16. Do you have prosthetic joints? No         HPI:     HPI related to upcoming procedure:   Patient is a 64 year old male who presents today for pre-operative evaluation for upcoming mitral valve repair and possible replacement. He was found to have valve disease in Dec 2019 during an evaluation at the ED for GI symptoms. He has since been followed by cardiology and undergone PHILLIP, holter, CXR as well as EKG. Most recent visit was yesterday, 04/28/2020, at which holter results were reviewed with patient. It was decided to wait on oral beta blockers as symptoms are stable. Today patient reports on/off chest discomfort, not associated with activity, that lasts 1-2 minutes at most. Denies pressure, pain, dizziness, syncope, trouble breathing, nausea, changes to bladder. Continues to have GI symptoms, but is working with gastroenterology to manage this.       See problem list for active medical problems.  Problems all longstanding and stable, except as noted/documented.  See ROS for pertinent symptoms related to these conditions.      MEDICAL HISTORY:     Patient Active Problem List    Diagnosis Date Noted     Chronic diarrhea 03/30/2020     Priority: Medium     Added automatically from request for surgery [id number]       Mitral valve prolapse 03/19/2020     Priority: Medium     Added automatically from request for surgery [id number]       Status post coronary angiogram 03/16/2020     Priority: Medium     Heart valve disease 03/02/2020     Priority: Medium     Added automatically from request for surgery [id number]       Hyperlipidemia LDL goal <100 02/21/2020     Priority: Medium     Bipolar disorder (H)      Priority: Medium     on Depakote        Past Medical History:   Diagnosis Date     Bipolar 1 disorder (H)      Bipolar disorder (H)     on Depakote     Mitral valve regurgitation      Past Surgical History:  "  Procedure Laterality Date     COLONOSCOPY N/A 4/1/2020    Procedure: Colonoscopy with biopsy;  Surgeon: Sebastián Nam MD;  Location:  GI     CV CORONARY ANGIOGRAM N/A 3/16/2020    Procedure: Coronary Angiogram;  Surgeon: Tyrone Fournier MD;  Location:  HEART CARDIAC CATH LAB     Current Outpatient Medications   Medication Sig Dispense Refill     acetaminophen (TYLENOL) 325 MG tablet As needed       divalproex (DEPAKOTE) 250 MG 24 hr tablet Take 500 mg by mouth daily.       ASPIRIN PO Take 325 mg by mouth daily as needed Unsure of dosage        IBUPROFEN PO        OTC products: no recent use of OTC ASA, NSAIDS or Steroids    No Known Allergies   Latex Allergy: NO    Social History     Tobacco Use     Smoking status: Never Smoker     Smokeless tobacco: Never Used   Substance Use Topics     Alcohol use: Yes     Alcohol/week: 0.0 standard drinks     Comment: 1-2 times a year     History   Drug Use No       REVIEW OF SYSTEMS:   Constitutional, neuro, ENT, endocrine, pulmonary, cardiac, gastrointestinal, genitourinary, musculoskeletal, integument and psychiatric systems are negative, except as otherwise noted.    EXAM:   /68 (BP Location: Right arm, Patient Position: Chair, Cuff Size: Adult Large)   Pulse 68   Temp 97  F (36.1  C) (Temporal)   Resp 20   Ht 1.803 m (5' 11\")   Wt 104.4 kg (230 lb 3.2 oz)   SpO2 96%   BMI 32.11 kg/m      GENERAL APPEARANCE: healthy, alert and no distress     EYES: EOMI,  PERRL     HENT: ear canals and TM's normal and nose and mouth without ulcers or lesions     NECK: no adenopathy, no asymmetry, masses, or scars and thyroid normal to palpation     RESP: lungs clear to auscultation - no rales, rhonchi or wheezes     CV: regular rates and rhythm, normal S1 S2, no S3 or S4 and no murmur, click or rub     ABDOMEN:  soft, nontender, no HSM or masses and bowel sounds normal     MS: extremities normal- no gross deformities noted, no evidence of inflammation in joints, " FROM in all extremities.     SKIN: no suspicious lesions or rashes     NEURO: Normal strength and tone, sensory exam grossly normal, mentation intact and speech normal     PSYCH: mentation appears normal. and affect normal/bright     LYMPHATICS: No cervical adenopathy    DIAGNOSTICS:   Results for MUSHTAQ CLARKE (MRN [id number]) as of 4/29/2020 12:53   Ref. Range 4/28/2020 10:11   Sodium Latest Ref Range: 133 - 144 mmol/L 141   Potassium Latest Ref Range: 3.4 - 5.3 mmol/L 4.1   Chloride Latest Ref Range: 94 - 109 mmol/L 111 (H)   Carbon Dioxide Latest Ref Range: 20 - 32 mmol/L 23   Urea Nitrogen Latest Ref Range: 7 - 30 mg/dL 12   Creatinine Latest Ref Range: 0.66 - 1.25 mg/dL 0.85   GFR Estimate Latest Ref Range: >60 mL/min/1.73_m2 >90   GFR Estimate If Black Latest Ref Range: >60 mL/min/1.73_m2 >90   Calcium Latest Ref Range: 8.5 - 10.1 mg/dL 8.7   Anion Gap Latest Ref Range: 3 - 14 mmol/L 7   Albumin Latest Ref Range: 3.4 - 5.0 g/dL 3.8   Protein Total Latest Ref Range: 6.8 - 8.8 g/dL 6.9   Bilirubin Total Latest Ref Range: 0.2 - 1.3 mg/dL 0.5   Alkaline Phosphatase Latest Ref Range: 40 - 150 U/L 85   ALT Latest Ref Range: 0 - 70 U/L 36   AST Latest Ref Range: 0 - 45 U/L 20   Hemoglobin A1C Latest Ref Range: 0 - 5.6 % 5.5   Glucose Latest Ref Range: 70 - 99 mg/dL 84   WBC Latest Ref Range: 4.0 - 11.0 10e9/L 8.3   Hemoglobin Latest Ref Range: 13.3 - 17.7 g/dL 14.3   Hematocrit Latest Ref Range: 40.0 - 53.0 % 42.9   Platelet Count Latest Ref Range: 150 - 450 10e9/L 268   RBC Count Latest Ref Range: 4.4 - 5.9 10e12/L 4.79   MCV Latest Ref Range: 78 - 100 fl 90   MCH Latest Ref Range: 26.5 - 33.0 pg 29.9   MCHC Latest Ref Range: 31.5 - 36.5 g/dL 33.3   RDW Latest Ref Range: 10.0 - 15.0 % 13.1   ABO Unknown O   RH(D) Unknown Pos   Antibody Screen Unknown Neg   Test Valid Only At Latest Units:     [facility]   Specimen Expires Unknown 05/01/2020     EKG completed on 03/16/2020    Recent Labs   Lab  Test 04/28/20  1011 03/17/20  0931 03/16/20  0655   HGB 14.3 15.0 15.0    291 291   INR  --   --  1.09     --  140   POTASSIUM 4.1  --  4.0   CR 0.85  --  0.83   A1C 5.5  --   --         IMPRESSION:   Reason for surgery/procedure: Heart valve diseasee  Diagnosis/reason for consult: Heart valve disease    The proposed surgical procedure is considered HIGH risk.    REVISED CARDIAC RISK INDEX  The patient has the following serious cardiovascular risks for perioperative complications such as (MI, PE, VFib and 3  AV Block):  High risk surgery (>5% cardiac complication risk)  INTERPRETATION: 1 risks: Class II (low risk - 0.9% complication rate)    The patient has the following additional risks for perioperative complications:  No identified additional risks      ICD-10-CM    1. Pre-op exam  Z01.818 COVID-19 Virus (Coronavirus) Antibody   2. Heart valve disease  I38 COVID-19 Virus (Coronavirus) Antibody       RECOMMENDATIONS:     --Consult hospital rounder / IM to assist post-op medical management    --Patient is to take all scheduled medications on the day of surgery EXCEPT for modifications listed below.  Depakote     APPROVAL GIVEN to proceed with proposed procedure, without further diagnostic evaluation       Signed Electronically by: Bib Tenorio PA-C    Copy of this evaluation report is provided to requesting physician.    [facility] Preop Guidelines    Revised Cardiac Risk Index

## 2020-04-29 NOTE — TELEPHONE ENCOUNTER
Please call patient that orders for covid testing have been placed. He should receive a call informing him of where and when he will be tested.    Bib Tenoiro PA-C on 4/29/2020 at 12:56 PM

## 2020-05-01 ENCOUNTER — ANESTHESIA EVENT (OUTPATIENT)
Dept: SURGERY | Facility: CLINIC | Age: 65
DRG: 219 | End: 2020-05-01
Payer: COMMERCIAL

## 2020-05-01 ASSESSMENT — LIFESTYLE VARIABLES: TOBACCO_USE: 0

## 2020-05-01 NOTE — ANESTHESIA PREPROCEDURE EVALUATION
Anesthesia Pre-Procedure Evaluation    Patient: Kumar Payne   MRN: [id number] : 1955          Preoperative Diagnosis: Mitral valve prolapse [I34.1]    Procedure(s):  MINIMALLY INVASIVE MITRAL VALVE REPAIR POSSIBLE REPLACEMENT    Past Medical History:   Diagnosis Date     Bipolar 1 disorder (H)      Bipolar disorder (H)     on Depakote     Mitral valve regurgitation      Past Surgical History:   Procedure Laterality Date     COLONOSCOPY N/A 2020    Procedure: Colonoscopy with biopsy;  Surgeon: Sebastián Nam MD;  Location:  GI     CV CORONARY ANGIOGRAM N/A 3/16/2020    Procedure: Coronary Angiogram;  Surgeon: Tyrone Fournier MD;  Location:  [facility]       Anesthesia Evaluation     .             ROS/MED HX    ENT/Pulmonary:      (-) tobacco use   Neurologic:       Cardiovascular:     (+) Dyslipidemia, ----. : . . . :. valvular problems/murmurs type: MR . Previous cardiac testing Echodate:2020results:The visual ejection fraction is estimated at 60-65%.  The right ventricle is normal in size and function.  Severe eccentric MR is noted with an anteriorly directed jet due to prolapse/  partial flail P2 segment of the mitral valve.  Findings discussed with referring providerdate: results: date: results:Cath date: results:          METS/Exercise Tolerance:     Hematologic:         Musculoskeletal:         GI/Hepatic:         Renal/Genitourinary:         Endo:         Psychiatric:     (+) psychiatric history bipolar      Infectious Disease:         Malignancy:         Other:                                 Lab Results   Component Value Date    WBC 8.3 2020    HGB 14.3 2020    HCT 42.9 2020     2020    CRP 7.7 2020    SED 2 2013     2020    POTASSIUM 4.1 2020    CHLORIDE 111 (H) 2020    CO2 23 2020    BUN 12 2020    CR 0.85 2020    GLC 84 2020    RUEL 8.7 2020    ALBUMIN 3.8 2020  "   PROTTOTAL 6.9 04/28/2020    ALT 36 04/28/2020    AST 20 04/28/2020    ALKPHOS 85 04/28/2020    BILITOTAL 0.5 04/28/2020    PTT 27 03/16/2020    INR 1.09 03/16/2020    TSH 0.24 (L) 03/17/2020       Preop Vitals  BP Readings from Last 3 Encounters:   04/29/20 116/68   04/01/20 126/76   03/17/20 119/75    Pulse Readings from Last 3 Encounters:   04/29/20 68   04/01/20 68   03/17/20 66      Resp Readings from Last 3 Encounters:   04/29/20 20   04/01/20 16   03/16/20 16    SpO2 Readings from Last 3 Encounters:   04/29/20 96%   04/01/20 95%   03/17/20 99%      Temp Readings from Last 1 Encounters:   04/29/20 36.1  C (97  F) (Temporal)    Ht Readings from Last 1 Encounters:   04/29/20 1.803 m (5' 11\")      Wt Readings from Last 1 Encounters:   04/29/20 104.4 kg (230 lb 3.2 oz)    Estimated body mass index is 32.11 kg/m  as calculated from the following:    Height as of 4/29/20: 1.803 m (5' 11\").    Weight as of 4/29/20: 104.4 kg (230 lb 3.2 oz).       Anesthesia Plan      History & Physical Review      ASA Status:  3 .    NPO Status:  > 8 hours    Plan for General with Intravenous induction. Maintenance will be Balanced.      Additional equipment: Arterial Line and PA Catheter        Postoperative Care      Consents  Anesthetic plan, risks, benefits and alternatives discussed with:  Patient..                 Genesis Cintron MD  "

## 2020-05-01 NOTE — PROGRESS NOTES
PTA medications updated by Medication Scribe day before surgery via phone call with patient      -LAST DOSES ENTERED BY NURSE-    Medication history sources: Patient, Surescripts and H&P  Medication history source reliability: Good  Adherence assessment: N/A Not Observed    Significant changes made to the medication list:  None      Additional medication history information:   None        Prior to Admission medications    Medication Sig Last Dose Taking? Auth Provider   acetaminophen (TYLENOL) 325 MG tablet Take 325 mg by mouth 4 times daily as needed   at PRN Yes Reported, Patient   aspirin (ASA) 325 MG EC tablet Take 325 mg by mouth daily as needed for moderate pain (headache)  at PRN Yes Reported, Patient   divalproex sodium extended-release (DEPAKOTE ER) 500 MG 24 hr tablet Take 1,000 mg by mouth every evening (takes 2 x 500mg)  at PM Yes Reported, Patient

## 2020-05-02 ENCOUNTER — OFFICE VISIT (OUTPATIENT)
Dept: URGENT CARE | Facility: URGENT CARE | Age: 65
End: 2020-05-02
Attending: SURGERY
Payer: COMMERCIAL

## 2020-05-02 DIAGNOSIS — I38 HEART VALVE DISEASE: ICD-10-CM

## 2020-05-02 DIAGNOSIS — Z11.59 ENCOUNTER FOR SCREENING FOR OTHER VIRAL DISEASES: ICD-10-CM

## 2020-05-02 DIAGNOSIS — Z01.818 PRE-OP EXAM: ICD-10-CM

## 2020-05-02 PROCEDURE — 87635 SARS-COV-2 COVID-19 AMP PRB: CPT | Performed by: SURGERY

## 2020-05-02 PROCEDURE — U0003 INFECTIOUS AGENT DETECTION BY NUCLEIC ACID (DNA OR RNA); SEVERE ACUTE RESPIRATORY SYNDROME CORONAVIRUS 2 (SARS-COV-2) (CORONAVIRUS DISEASE [COVID-19]), AMPLIFIED PROBE TECHNIQUE, MAKING USE OF HIGH THROUGHPUT TECHNOLOGIES AS DESCRIBED BY CMS-2020-01-R: HCPCS | Performed by: SURGERY

## 2020-05-02 PROCEDURE — 99000 SPECIMEN HANDLING OFFICE-LAB: CPT | Performed by: SURGERY

## 2020-05-02 PROCEDURE — 99207 ZZC NO BILLABLE SERVICE THIS VISIT: CPT

## 2020-05-03 LAB
SARS-COV-2 RNA SPEC QL NAA+PROBE: NOT DETECTED
SPECIMEN SOURCE: NORMAL

## 2020-05-04 ENCOUNTER — APPOINTMENT (OUTPATIENT)
Dept: GENERAL RADIOLOGY | Facility: CLINIC | Age: 65
DRG: 219 | End: 2020-05-04
Attending: PHYSICIAN ASSISTANT
Payer: COMMERCIAL

## 2020-05-04 ENCOUNTER — APPOINTMENT (OUTPATIENT)
Dept: CARDIOLOGY | Facility: CLINIC | Age: 65
DRG: 219 | End: 2020-05-04
Attending: SURGERY
Payer: COMMERCIAL

## 2020-05-04 ENCOUNTER — ANESTHESIA (OUTPATIENT)
Dept: SURGERY | Facility: CLINIC | Age: 65
DRG: 219 | End: 2020-05-04
Payer: COMMERCIAL

## 2020-05-04 ENCOUNTER — HOSPITAL ENCOUNTER (INPATIENT)
Facility: CLINIC | Age: 65
LOS: 5 days | Discharge: HOME | DRG: 219 | End: 2020-05-09
Attending: SURGERY | Admitting: SURGERY
Payer: COMMERCIAL

## 2020-05-04 DIAGNOSIS — I34.1 MITRAL VALVE PROLAPSE: ICD-10-CM

## 2020-05-04 DIAGNOSIS — Z98.890 S/P MVR (MITRAL VALVE REPAIR): Primary | ICD-10-CM

## 2020-05-04 LAB
ALBUMIN SERPL-MCNC: 3.4 G/DL (ref 3.4–5)
ALP SERPL-CCNC: 77 U/L (ref 40–150)
ALT SERPL W P-5'-P-CCNC: 44 U/L (ref 0–70)
ANION GAP SERPL CALCULATED.3IONS-SCNC: 10 MMOL/L (ref 3–14)
ANION GAP SERPL CALCULATED.3IONS-SCNC: 6 MMOL/L (ref 3–14)
APTT PPP: 28 SEC (ref 22–37)
APTT PPP: 40 SEC (ref 22–37)
AST SERPL W P-5'-P-CCNC: 115 U/L (ref 0–45)
BASE DEFICIT BLDA-SCNC: 3 MMOL/L
BASE DEFICIT BLDV-SCNC: 3.9 MMOL/L
BILIRUB SERPL-MCNC: 0.5 MG/DL (ref 0.2–1.3)
BUN SERPL-MCNC: 10 MG/DL (ref 7–30)
BUN SERPL-MCNC: 10 MG/DL (ref 7–30)
CA-I BLD-MCNC: 4.6 MG/DL (ref 4.4–5.2)
CALCIUM SERPL-MCNC: 7.9 MG/DL (ref 8.5–10.1)
CALCIUM SERPL-MCNC: 8.6 MG/DL (ref 8.5–10.1)
CHLORIDE SERPL-SCNC: 109 MMOL/L (ref 94–109)
CHLORIDE SERPL-SCNC: 113 MMOL/L (ref 94–109)
CO2 SERPL-SCNC: 22 MMOL/L (ref 20–32)
CO2 SERPL-SCNC: 24 MMOL/L (ref 20–32)
CREAT SERPL-MCNC: 0.8 MG/DL (ref 0.66–1.25)
CREAT SERPL-MCNC: 0.83 MG/DL (ref 0.66–1.25)
ERYTHROCYTE [DISTWIDTH] IN BLOOD BY AUTOMATED COUNT: 13.2 % (ref 10–15)
ERYTHROCYTE [DISTWIDTH] IN BLOOD BY AUTOMATED COUNT: 13.4 % (ref 10–15)
FIBRINOGEN PPP-MCNC: 199 MG/DL (ref 200–420)
GFR SERPL CREATININE-BSD FRML MDRD: >90 ML/MIN/{1.73_M2}
GFR SERPL CREATININE-BSD FRML MDRD: >90 ML/MIN/{1.73_M2}
GLUCOSE BLDC GLUCOMTR-MCNC: 132 MG/DL (ref 70–99)
GLUCOSE BLDC GLUCOMTR-MCNC: 147 MG/DL (ref 70–99)
GLUCOSE BLDC GLUCOMTR-MCNC: 148 MG/DL (ref 70–99)
GLUCOSE BLDC GLUCOMTR-MCNC: 166 MG/DL (ref 70–99)
GLUCOSE BLDC GLUCOMTR-MCNC: 173 MG/DL (ref 70–99)
GLUCOSE BLDC GLUCOMTR-MCNC: 208 MG/DL (ref 70–99)
GLUCOSE SERPL-MCNC: 142 MG/DL (ref 70–99)
GLUCOSE SERPL-MCNC: 185 MG/DL (ref 70–99)
HCO3 BLD-SCNC: 24 MMOL/L (ref 21–28)
HCO3 BLDV-SCNC: 23 MMOL/L (ref 21–28)
HCT VFR BLD AUTO: 37.7 % (ref 40–53)
HCT VFR BLD AUTO: 40.7 % (ref 40–53)
HGB BLD-MCNC: 12.9 G/DL (ref 13.3–17.7)
HGB BLD-MCNC: 13.9 G/DL (ref 13.3–17.7)
INR PPP: 1.31 (ref 0.86–1.14)
INR PPP: 1.48 (ref 0.86–1.14)
LACTATE BLD-SCNC: 3.4 MMOL/L (ref 0.7–2)
MAGNESIUM SERPL-MCNC: 2.8 MG/DL (ref 1.6–2.3)
MCH RBC QN AUTO: 29.7 PG (ref 26.5–33)
MCH RBC QN AUTO: 30.6 PG (ref 26.5–33)
MCHC RBC AUTO-ENTMCNC: 34.2 G/DL (ref 31.5–36.5)
MCHC RBC AUTO-ENTMCNC: 34.2 G/DL (ref 31.5–36.5)
MCV RBC AUTO: 87 FL (ref 78–100)
MCV RBC AUTO: 89 FL (ref 78–100)
MRSA DNA SPEC QL NAA+PROBE: NEGATIVE
O2/TOTAL GAS SETTING VFR VENT: 45 %
O2/TOTAL GAS SETTING VFR VENT: ABNORMAL %
OXYHGB MFR BLD: 98 % (ref 92–100)
OXYHGB MFR BLDV: 79 %
PCO2 BLD: 48 MM HG (ref 35–45)
PCO2 BLDV: 48 MM HG (ref 40–50)
PH BLD: 7.31 PH (ref 7.35–7.45)
PH BLDV: 7.29 PH (ref 7.32–7.43)
PHOSPHATE SERPL-MCNC: 2.1 MG/DL (ref 2.5–4.5)
PLATELET # BLD AUTO: 216 10E9/L (ref 150–450)
PLATELET # BLD AUTO: 217 10E9/L (ref 150–450)
PO2 BLD: 145 MM HG (ref 80–105)
PO2 BLDV: 46 MM HG (ref 25–47)
POTASSIUM SERPL-SCNC: 4.3 MMOL/L (ref 3.4–5.3)
POTASSIUM SERPL-SCNC: 4.5 MMOL/L (ref 3.4–5.3)
PROT SERPL-MCNC: 6 G/DL (ref 6.8–8.8)
RBC # BLD AUTO: 4.22 10E12/L (ref 4.4–5.9)
RBC # BLD AUTO: 4.68 10E12/L (ref 4.4–5.9)
SODIUM SERPL-SCNC: 141 MMOL/L (ref 133–144)
SODIUM SERPL-SCNC: 143 MMOL/L (ref 133–144)
SPECIMEN SOURCE: NORMAL
WBC # BLD AUTO: 26.3 10E9/L (ref 4–11)
WBC # BLD AUTO: 27.9 10E9/L (ref 4–11)

## 2020-05-04 PROCEDURE — 25000128 H RX IP 250 OP 636: Performed by: SURGERY

## 2020-05-04 PROCEDURE — 25000125 ZZHC RX 250: Performed by: SURGERY

## 2020-05-04 PROCEDURE — 02UG08Z SUPPLEMENT MITRAL VALVE WITH ZOOPLASTIC TISSUE, OPEN APPROACH: ICD-10-PCS | Performed by: SURGERY

## 2020-05-04 PROCEDURE — 41000023 ZZH PERA-PERFUSION, SH ONLY,  EACH ADDTL 15 MIN: Performed by: SURGERY

## 2020-05-04 PROCEDURE — 25800030 ZZH RX IP 258 OP 636: Performed by: ANESTHESIOLOGY

## 2020-05-04 PROCEDURE — 37000009 ZZH ANESTHESIA TECHNICAL FEE, EACH ADDTL 15 MIN: Performed by: SURGERY

## 2020-05-04 PROCEDURE — 93320 DOPPLER ECHO COMPLETE: CPT | Mod: 26 | Performed by: INTERNAL MEDICINE

## 2020-05-04 PROCEDURE — 25000125 ZZHC RX 250: Performed by: NURSE ANESTHETIST, CERTIFIED REGISTERED

## 2020-05-04 PROCEDURE — 87641 MR-STAPH DNA AMP PROBE: CPT | Performed by: SURGERY

## 2020-05-04 PROCEDURE — 25800030 ZZH RX IP 258 OP 636: Performed by: PHYSICIAN ASSISTANT

## 2020-05-04 PROCEDURE — 41000022 ZZH PER-PERFUSION, SH ONLY,  1ST 30 MIN: Performed by: SURGERY

## 2020-05-04 PROCEDURE — 25800030 ZZH RX IP 258 OP 636: Performed by: SURGERY

## 2020-05-04 PROCEDURE — 93312 ECHO TRANSESOPHAGEAL: CPT | Mod: 26 | Performed by: INTERNAL MEDICINE

## 2020-05-04 PROCEDURE — 25000565 ZZH ISOFLURANE, EA 15 MIN: Performed by: SURGERY

## 2020-05-04 PROCEDURE — 80048 BASIC METABOLIC PNL TOTAL CA: CPT | Performed by: SURGERY

## 2020-05-04 PROCEDURE — 25800030 ZZH RX IP 258 OP 636

## 2020-05-04 PROCEDURE — 25000125 ZZHC RX 250: Performed by: ANESTHESIOLOGY

## 2020-05-04 PROCEDURE — 25800030 ZZH RX IP 258 OP 636: Performed by: NURSE ANESTHETIST, CERTIFIED REGISTERED

## 2020-05-04 PROCEDURE — 83605 ASSAY OF LACTIC ACID: CPT | Performed by: SURGERY

## 2020-05-04 PROCEDURE — 93325 DOPPLER ECHO COLOR FLOW MAPG: CPT | Mod: 26 | Performed by: INTERNAL MEDICINE

## 2020-05-04 PROCEDURE — C1762 CONN TISS, HUMAN(INC FASCIA): HCPCS | Performed by: SURGERY

## 2020-05-04 PROCEDURE — 85384 FIBRINOGEN ACTIVITY: CPT | Performed by: SURGERY

## 2020-05-04 PROCEDURE — 82805 BLOOD GASES W/O2 SATURATION: CPT | Performed by: SURGERY

## 2020-05-04 PROCEDURE — 02BG0ZZ EXCISION OF MITRAL VALVE, OPEN APPROACH: ICD-10-PCS | Performed by: SURGERY

## 2020-05-04 PROCEDURE — 25000128 H RX IP 250 OP 636: Performed by: PHYSICIAN ASSISTANT

## 2020-05-04 PROCEDURE — 80053 COMPREHEN METABOLIC PANEL: CPT | Performed by: PHYSICIAN ASSISTANT

## 2020-05-04 PROCEDURE — 82330 ASSAY OF CALCIUM: CPT | Performed by: SURGERY

## 2020-05-04 PROCEDURE — 93005 ELECTROCARDIOGRAM TRACING: CPT

## 2020-05-04 PROCEDURE — 93325 DOPPLER ECHO COLOR FLOW MAPG: CPT

## 2020-05-04 PROCEDURE — 85730 THROMBOPLASTIN TIME PARTIAL: CPT | Performed by: PHYSICIAN ASSISTANT

## 2020-05-04 PROCEDURE — 93010 ELECTROCARDIOGRAM REPORT: CPT | Performed by: INTERNAL MEDICINE

## 2020-05-04 PROCEDURE — 5A1221Z PERFORMANCE OF CARDIAC OUTPUT, CONTINUOUS: ICD-10-PCS | Performed by: SURGERY

## 2020-05-04 PROCEDURE — 25000125 ZZHC RX 250

## 2020-05-04 PROCEDURE — 85610 PROTHROMBIN TIME: CPT | Performed by: SURGERY

## 2020-05-04 PROCEDURE — 36000067 ZZH SURGERY LEVEL 5 1ST 30 MIN: Performed by: SURGERY

## 2020-05-04 PROCEDURE — 83735 ASSAY OF MAGNESIUM: CPT | Performed by: PHYSICIAN ASSISTANT

## 2020-05-04 PROCEDURE — 40000275 ZZH STATISTIC RCP TIME EA 10 MIN

## 2020-05-04 PROCEDURE — 25000128 H RX IP 250 OP 636: Performed by: NURSE ANESTHETIST, CERTIFIED REGISTERED

## 2020-05-04 PROCEDURE — 36000069 ZZH SURGERY LEVEL 5 EA 15 ADDTL MIN: Performed by: SURGERY

## 2020-05-04 PROCEDURE — 25000131 ZZH RX MED GY IP 250 OP 636 PS 637: Performed by: PHYSICIAN ASSISTANT

## 2020-05-04 PROCEDURE — 25000132 ZZH RX MED GY IP 250 OP 250 PS 637: Performed by: SURGERY

## 2020-05-04 PROCEDURE — 84100 ASSAY OF PHOSPHORUS: CPT | Performed by: PHYSICIAN ASSISTANT

## 2020-05-04 PROCEDURE — 85027 COMPLETE CBC AUTOMATED: CPT | Performed by: PHYSICIAN ASSISTANT

## 2020-05-04 PROCEDURE — 20000003 ZZH R&B ICU

## 2020-05-04 PROCEDURE — 82330 ASSAY OF CALCIUM: CPT | Performed by: PHYSICIAN ASSISTANT

## 2020-05-04 PROCEDURE — 25000128 H RX IP 250 OP 636: Performed by: ANESTHESIOLOGY

## 2020-05-04 PROCEDURE — 37000008 ZZH ANESTHESIA TECHNICAL FEE, 1ST 30 MIN: Performed by: SURGERY

## 2020-05-04 PROCEDURE — 25000132 ZZH RX MED GY IP 250 OP 250 PS 637: Performed by: INTERNAL MEDICINE

## 2020-05-04 PROCEDURE — 99291 CRITICAL CARE FIRST HOUR: CPT | Performed by: INTERNAL MEDICINE

## 2020-05-04 PROCEDURE — 85730 THROMBOPLASTIN TIME PARTIAL: CPT | Performed by: SURGERY

## 2020-05-04 PROCEDURE — P9041 ALBUMIN (HUMAN),5%, 50ML: HCPCS

## 2020-05-04 PROCEDURE — 40000171 ZZH STATISTIC PRE-PROCEDURE ASSESSMENT III: Performed by: SURGERY

## 2020-05-04 PROCEDURE — 40000986 XR CHEST PORT 1 VW

## 2020-05-04 PROCEDURE — 25000128 H RX IP 250 OP 636

## 2020-05-04 PROCEDURE — 25000125 ZZHC RX 250: Performed by: PHYSICIAN ASSISTANT

## 2020-05-04 PROCEDURE — 83605 ASSAY OF LACTIC ACID: CPT | Performed by: PHYSICIAN ASSISTANT

## 2020-05-04 PROCEDURE — 85027 COMPLETE CBC AUTOMATED: CPT | Performed by: SURGERY

## 2020-05-04 PROCEDURE — 94003 VENT MGMT INPAT SUBQ DAY: CPT

## 2020-05-04 PROCEDURE — 94002 VENT MGMT INPAT INIT DAY: CPT

## 2020-05-04 PROCEDURE — 27210794 ZZH OR GENERAL SUPPLY STERILE: Performed by: SURGERY

## 2020-05-04 PROCEDURE — 40000008 ZZH STATISTIC AIRWAY CARE

## 2020-05-04 PROCEDURE — P9041 ALBUMIN (HUMAN),5%, 50ML: HCPCS | Performed by: PHYSICIAN ASSISTANT

## 2020-05-04 PROCEDURE — 85610 PROTHROMBIN TIME: CPT | Performed by: PHYSICIAN ASSISTANT

## 2020-05-04 PROCEDURE — 00000146 ZZHCL STATISTIC GLUCOSE BY METER IP

## 2020-05-04 PROCEDURE — 87640 STAPH A DNA AMP PROBE: CPT | Performed by: SURGERY

## 2020-05-04 PROCEDURE — 82805 BLOOD GASES W/O2 SATURATION: CPT | Performed by: PHYSICIAN ASSISTANT

## 2020-05-04 PROCEDURE — 25000132 ZZH RX MED GY IP 250 OP 250 PS 637: Performed by: PHYSICIAN ASSISTANT

## 2020-05-04 PROCEDURE — C9113 INJ PANTOPRAZOLE SODIUM, VIA: HCPCS | Performed by: PHYSICIAN ASSISTANT

## 2020-05-04 DEVICE — IMPLANTABLE DEVICE: Type: IMPLANTABLE DEVICE | Site: HEART | Status: FUNCTIONAL

## 2020-05-04 RX ORDER — METHOCARBAMOL 500 MG/1
500 TABLET, FILM COATED ORAL 4 TIMES DAILY PRN
Status: DISCONTINUED | OUTPATIENT
Start: 2020-05-04 | End: 2020-05-09 | Stop reason: HOSPADM

## 2020-05-04 RX ORDER — VECURONIUM BROMIDE 1 MG/ML
INJECTION, POWDER, LYOPHILIZED, FOR SOLUTION INTRAVENOUS PRN
Status: DISCONTINUED | OUTPATIENT
Start: 2020-05-04 | End: 2020-05-04

## 2020-05-04 RX ORDER — SODIUM CHLORIDE, SODIUM LACTATE, POTASSIUM CHLORIDE, CALCIUM CHLORIDE 600; 310; 30; 20 MG/100ML; MG/100ML; MG/100ML; MG/100ML
INJECTION, SOLUTION INTRAVENOUS CONTINUOUS PRN
Status: DISCONTINUED | OUTPATIENT
Start: 2020-05-04 | End: 2020-05-04

## 2020-05-04 RX ORDER — PROTAMINE SULFATE 10 MG/ML
INJECTION, SOLUTION INTRAVENOUS PRN
Status: DISCONTINUED | OUTPATIENT
Start: 2020-05-04 | End: 2020-05-04

## 2020-05-04 RX ORDER — PROCHLORPERAZINE MALEATE 5 MG
5 TABLET ORAL EVERY 6 HOURS PRN
Status: DISCONTINUED | OUTPATIENT
Start: 2020-05-04 | End: 2020-05-09 | Stop reason: HOSPADM

## 2020-05-04 RX ORDER — NICOTINE POLACRILEX 4 MG
15-30 LOZENGE BUCCAL
Status: DISCONTINUED | OUTPATIENT
Start: 2020-05-04 | End: 2020-05-09 | Stop reason: HOSPADM

## 2020-05-04 RX ORDER — ONDANSETRON 4 MG/1
4 TABLET, ORALLY DISINTEGRATING ORAL EVERY 6 HOURS PRN
Status: DISCONTINUED | OUTPATIENT
Start: 2020-05-04 | End: 2020-05-09 | Stop reason: HOSPADM

## 2020-05-04 RX ORDER — HEPARIN SODIUM 1000 [USP'U]/ML
INJECTION, SOLUTION INTRAVENOUS; SUBCUTANEOUS PRN
Status: DISCONTINUED | OUTPATIENT
Start: 2020-05-04 | End: 2020-05-04

## 2020-05-04 RX ORDER — MUPIROCIN 20 MG/G
0.5 OINTMENT TOPICAL 2 TIMES DAILY
Status: DISCONTINUED | OUTPATIENT
Start: 2020-05-04 | End: 2020-05-04 | Stop reason: CLARIF

## 2020-05-04 RX ORDER — HYDROMORPHONE HYDROCHLORIDE 1 MG/ML
.3-.5 INJECTION, SOLUTION INTRAMUSCULAR; INTRAVENOUS; SUBCUTANEOUS
Status: DISCONTINUED | OUTPATIENT
Start: 2020-05-04 | End: 2020-05-09 | Stop reason: HOSPADM

## 2020-05-04 RX ORDER — ACETAMINOPHEN 325 MG/1
650 TABLET ORAL EVERY 4 HOURS PRN
Status: DISCONTINUED | OUTPATIENT
Start: 2020-05-07 | End: 2020-05-09 | Stop reason: HOSPADM

## 2020-05-04 RX ORDER — PROPOFOL 10 MG/ML
INJECTION, EMULSION INTRAVENOUS CONTINUOUS PRN
Status: DISCONTINUED | OUTPATIENT
Start: 2020-05-04 | End: 2020-05-04

## 2020-05-04 RX ORDER — FAMOTIDINE 20 MG/1
20 TABLET, FILM COATED ORAL
Status: COMPLETED | OUTPATIENT
Start: 2020-05-04 | End: 2020-05-04

## 2020-05-04 RX ORDER — POTASSIUM CHLORIDE 7.45 MG/ML
10 INJECTION INTRAVENOUS
Status: DISCONTINUED | OUTPATIENT
Start: 2020-05-04 | End: 2020-05-09 | Stop reason: HOSPADM

## 2020-05-04 RX ORDER — SODIUM CHLORIDE, SODIUM LACTATE, POTASSIUM CHLORIDE, CALCIUM CHLORIDE 600; 310; 30; 20 MG/100ML; MG/100ML; MG/100ML; MG/100ML
INJECTION, SOLUTION INTRAVENOUS CONTINUOUS
Status: DISCONTINUED | OUTPATIENT
Start: 2020-05-04 | End: 2020-05-04 | Stop reason: HOSPADM

## 2020-05-04 RX ORDER — SODIUM CHLORIDE 9 MG/ML
INJECTION, SOLUTION INTRAVENOUS CONTINUOUS PRN
Status: DISCONTINUED | OUTPATIENT
Start: 2020-05-04 | End: 2020-05-04

## 2020-05-04 RX ORDER — PHENYLEPHRINE HCL IN 0.9% NACL 50MG/250ML
.5-2 PLASTIC BAG, INJECTION (ML) INTRAVENOUS CONTINUOUS
Status: DISCONTINUED | OUTPATIENT
Start: 2020-05-04 | End: 2020-05-05

## 2020-05-04 RX ORDER — MEPERIDINE HYDROCHLORIDE 25 MG/ML
12.5-25 INJECTION INTRAMUSCULAR; INTRAVENOUS; SUBCUTANEOUS
Status: DISCONTINUED | OUTPATIENT
Start: 2020-05-04 | End: 2020-05-05

## 2020-05-04 RX ORDER — LIDOCAINE 4 G/G
1-2 PATCH TOPICAL
Status: DISCONTINUED | OUTPATIENT
Start: 2020-05-05 | End: 2020-05-09 | Stop reason: HOSPADM

## 2020-05-04 RX ORDER — HEPARIN SODIUM 5000 [USP'U]/.5ML
5000 INJECTION, SOLUTION INTRAVENOUS; SUBCUTANEOUS EVERY 8 HOURS
Status: DISCONTINUED | OUTPATIENT
Start: 2020-05-05 | End: 2020-05-09 | Stop reason: HOSPADM

## 2020-05-04 RX ORDER — POTASSIUM CL/LIDO/0.9 % NACL 10MEQ/0.1L
10 INTRAVENOUS SOLUTION, PIGGYBACK (ML) INTRAVENOUS
Status: DISCONTINUED | OUTPATIENT
Start: 2020-05-04 | End: 2020-05-09 | Stop reason: HOSPADM

## 2020-05-04 RX ORDER — DEXTROSE MONOHYDRATE 100 MG/ML
INJECTION, SOLUTION INTRAVENOUS CONTINUOUS PRN
Status: DISCONTINUED | OUTPATIENT
Start: 2020-05-04 | End: 2020-05-09 | Stop reason: HOSPADM

## 2020-05-04 RX ORDER — METOPROLOL TARTRATE 25 MG/1
25 TABLET, FILM COATED ORAL EVERY 12 HOURS
Status: DISCONTINUED | OUTPATIENT
Start: 2020-05-05 | End: 2020-05-06

## 2020-05-04 RX ORDER — DEXTROSE MONOHYDRATE 25 G/50ML
25-50 INJECTION, SOLUTION INTRAVENOUS
Status: DISCONTINUED | OUTPATIENT
Start: 2020-05-04 | End: 2020-05-09 | Stop reason: HOSPADM

## 2020-05-04 RX ORDER — AMOXICILLIN 250 MG
1 CAPSULE ORAL 2 TIMES DAILY
Status: DISCONTINUED | OUTPATIENT
Start: 2020-05-04 | End: 2020-05-09 | Stop reason: HOSPADM

## 2020-05-04 RX ORDER — LIDOCAINE 40 MG/G
CREAM TOPICAL
Status: DISCONTINUED | OUTPATIENT
Start: 2020-05-04 | End: 2020-05-09 | Stop reason: HOSPADM

## 2020-05-04 RX ORDER — NITROGLYCERIN 20 MG/100ML
INJECTION INTRAVENOUS CONTINUOUS PRN
Status: DISCONTINUED | OUTPATIENT
Start: 2020-05-04 | End: 2020-05-04

## 2020-05-04 RX ORDER — ALBUMIN, HUMAN INJ 5% 5 %
500-1000 SOLUTION INTRAVENOUS
Status: COMPLETED | OUTPATIENT
Start: 2020-05-04 | End: 2020-05-04

## 2020-05-04 RX ORDER — POLYETHYLENE GLYCOL 3350 17 G/17G
17 POWDER, FOR SOLUTION ORAL DAILY
Status: DISCONTINUED | OUTPATIENT
Start: 2020-05-04 | End: 2020-05-09 | Stop reason: HOSPADM

## 2020-05-04 RX ORDER — HYDRALAZINE HYDROCHLORIDE 20 MG/ML
10 INJECTION INTRAMUSCULAR; INTRAVENOUS EVERY 30 MIN PRN
Status: DISCONTINUED | OUTPATIENT
Start: 2020-05-04 | End: 2020-05-09 | Stop reason: HOSPADM

## 2020-05-04 RX ORDER — CEFAZOLIN SODIUM 2 G/100ML
2 INJECTION, SOLUTION INTRAVENOUS EVERY 8 HOURS
Status: COMPLETED | OUTPATIENT
Start: 2020-05-04 | End: 2020-05-05

## 2020-05-04 RX ORDER — FENTANYL CITRATE 50 UG/ML
50-100 INJECTION, SOLUTION INTRAMUSCULAR; INTRAVENOUS EVERY 5 MIN PRN
Status: DISCONTINUED | OUTPATIENT
Start: 2020-05-04 | End: 2020-05-04 | Stop reason: HOSPADM

## 2020-05-04 RX ORDER — CEFAZOLIN SODIUM 2 G/100ML
2 INJECTION, SOLUTION INTRAVENOUS
Status: COMPLETED | OUTPATIENT
Start: 2020-05-04 | End: 2020-05-04

## 2020-05-04 RX ORDER — NALOXONE HYDROCHLORIDE 0.4 MG/ML
.1-.4 INJECTION, SOLUTION INTRAMUSCULAR; INTRAVENOUS; SUBCUTANEOUS
Status: DISCONTINUED | OUTPATIENT
Start: 2020-05-04 | End: 2020-05-09 | Stop reason: HOSPADM

## 2020-05-04 RX ORDER — MAGNESIUM SULFATE HEPTAHYDRATE 40 MG/ML
2 INJECTION, SOLUTION INTRAVENOUS DAILY PRN
Status: DISCONTINUED | OUTPATIENT
Start: 2020-05-04 | End: 2020-05-09 | Stop reason: HOSPADM

## 2020-05-04 RX ORDER — ONDANSETRON 2 MG/ML
4 INJECTION INTRAMUSCULAR; INTRAVENOUS EVERY 6 HOURS PRN
Status: DISCONTINUED | OUTPATIENT
Start: 2020-05-04 | End: 2020-05-09 | Stop reason: HOSPADM

## 2020-05-04 RX ORDER — FENTANYL CITRATE 50 UG/ML
INJECTION, SOLUTION INTRAMUSCULAR; INTRAVENOUS PRN
Status: DISCONTINUED | OUTPATIENT
Start: 2020-05-04 | End: 2020-05-04

## 2020-05-04 RX ORDER — NITROGLYCERIN 20 MG/100ML
.07-2 INJECTION INTRAVENOUS CONTINUOUS
Status: DISCONTINUED | OUTPATIENT
Start: 2020-05-04 | End: 2020-05-05

## 2020-05-04 RX ORDER — MUPIROCIN 20 MG/G
OINTMENT TOPICAL 2 TIMES DAILY
Status: DISCONTINUED | OUTPATIENT
Start: 2020-05-04 | End: 2020-05-04 | Stop reason: HOSPADM

## 2020-05-04 RX ORDER — METOPROLOL TARTRATE 25 MG/1
25 TABLET, FILM COATED ORAL
Status: COMPLETED | OUTPATIENT
Start: 2020-05-04 | End: 2020-05-04

## 2020-05-04 RX ORDER — POTASSIUM CHLORIDE 1500 MG/1
20-40 TABLET, EXTENDED RELEASE ORAL
Status: DISCONTINUED | OUTPATIENT
Start: 2020-05-04 | End: 2020-05-09 | Stop reason: HOSPADM

## 2020-05-04 RX ORDER — NEOSTIGMINE METHYLSULFATE 1 MG/ML
VIAL (ML) INJECTION PRN
Status: DISCONTINUED | OUTPATIENT
Start: 2020-05-04 | End: 2020-05-04

## 2020-05-04 RX ORDER — DEXTROSE MONOHYDRATE, SODIUM CHLORIDE, AND POTASSIUM CHLORIDE 50; 1.49; 4.5 G/1000ML; G/1000ML; G/1000ML
INJECTION, SOLUTION INTRAVENOUS CONTINUOUS
Status: DISCONTINUED | OUTPATIENT
Start: 2020-05-04 | End: 2020-05-08

## 2020-05-04 RX ORDER — GABAPENTIN 100 MG/1
100 CAPSULE ORAL 3 TIMES DAILY
Status: COMPLETED | OUTPATIENT
Start: 2020-05-04 | End: 2020-05-07

## 2020-05-04 RX ORDER — DIVALPROEX SODIUM 500 MG/1
1000 TABLET, EXTENDED RELEASE ORAL AT BEDTIME
Status: DISCONTINUED | OUTPATIENT
Start: 2020-05-04 | End: 2020-05-09 | Stop reason: HOSPADM

## 2020-05-04 RX ORDER — CEFAZOLIN SODIUM 1 G/3ML
1 INJECTION, POWDER, FOR SOLUTION INTRAMUSCULAR; INTRAVENOUS SEE ADMIN INSTRUCTIONS
Status: DISCONTINUED | OUTPATIENT
Start: 2020-05-04 | End: 2020-05-04 | Stop reason: HOSPADM

## 2020-05-04 RX ORDER — POTASSIUM CHLORIDE 29.8 MG/ML
20 INJECTION INTRAVENOUS
Status: DISCONTINUED | OUTPATIENT
Start: 2020-05-04 | End: 2020-05-09 | Stop reason: HOSPADM

## 2020-05-04 RX ORDER — GLYCOPYRROLATE 0.2 MG/ML
INJECTION, SOLUTION INTRAMUSCULAR; INTRAVENOUS PRN
Status: DISCONTINUED | OUTPATIENT
Start: 2020-05-04 | End: 2020-05-04

## 2020-05-04 RX ORDER — BUPIVACAINE HYDROCHLORIDE 5 MG/ML
INJECTION, SOLUTION PERINEURAL PRN
Status: DISCONTINUED | OUTPATIENT
Start: 2020-05-04 | End: 2020-05-04 | Stop reason: HOSPADM

## 2020-05-04 RX ORDER — MAGNESIUM SULFATE HEPTAHYDRATE 40 MG/ML
4 INJECTION, SOLUTION INTRAVENOUS EVERY 4 HOURS PRN
Status: DISCONTINUED | OUTPATIENT
Start: 2020-05-04 | End: 2020-05-09 | Stop reason: HOSPADM

## 2020-05-04 RX ORDER — AMOXICILLIN 250 MG
2 CAPSULE ORAL 2 TIMES DAILY
Status: DISCONTINUED | OUTPATIENT
Start: 2020-05-04 | End: 2020-05-09 | Stop reason: HOSPADM

## 2020-05-04 RX ORDER — PROPOFOL 10 MG/ML
INJECTION, EMULSION INTRAVENOUS PRN
Status: DISCONTINUED | OUTPATIENT
Start: 2020-05-04 | End: 2020-05-04

## 2020-05-04 RX ORDER — OXYCODONE HYDROCHLORIDE 5 MG/1
5-10 TABLET ORAL EVERY 4 HOURS PRN
Status: DISCONTINUED | OUTPATIENT
Start: 2020-05-04 | End: 2020-05-06

## 2020-05-04 RX ORDER — ACETAMINOPHEN 325 MG/1
975 TABLET ORAL EVERY 8 HOURS
Status: COMPLETED | OUTPATIENT
Start: 2020-05-04 | End: 2020-05-07

## 2020-05-04 RX ORDER — POTASSIUM CHLORIDE 1.5 G/1.58G
20-40 POWDER, FOR SOLUTION ORAL
Status: DISCONTINUED | OUTPATIENT
Start: 2020-05-04 | End: 2020-05-09 | Stop reason: HOSPADM

## 2020-05-04 RX ADMIN — POTASSIUM PHOSPHATE, MONOBASIC AND POTASSIUM PHOSPHATE, DIBASIC 15 MMOL: 224; 236 INJECTION, SOLUTION INTRAVENOUS at 16:00

## 2020-05-04 RX ADMIN — MIDAZOLAM HYDROCHLORIDE 5 MG: 1 INJECTION, SOLUTION INTRAMUSCULAR; INTRAVENOUS at 07:33

## 2020-05-04 RX ADMIN — FENTANYL CITRATE 50 MCG: 50 INJECTION, SOLUTION INTRAMUSCULAR; INTRAVENOUS at 08:55

## 2020-05-04 RX ADMIN — LIDOCAINE HYDROCHLORIDE 1 ML: 10 INJECTION, SOLUTION EPIDURAL; INFILTRATION; INTRACAUDAL; PERINEURAL at 06:40

## 2020-05-04 RX ADMIN — FENTANYL CITRATE 50 MCG: 0.05 INJECTION, SOLUTION INTRAMUSCULAR; INTRAVENOUS at 07:07

## 2020-05-04 RX ADMIN — GABAPENTIN 100 MG: 100 CAPSULE ORAL at 18:14

## 2020-05-04 RX ADMIN — METOPROLOL TARTRATE 25 MG: 25 TABLET ORAL at 06:20

## 2020-05-04 RX ADMIN — HEPARIN SODIUM 42 ML: 1000 INJECTION INTRAVENOUS; SUBCUTANEOUS at 08:44

## 2020-05-04 RX ADMIN — PHENYLEPHRINE HYDROCHLORIDE 200 MCG: 10 INJECTION INTRAVENOUS at 07:34

## 2020-05-04 RX ADMIN — MIDAZOLAM HYDROCHLORIDE 1 MG: 1 INJECTION, SOLUTION INTRAMUSCULAR; INTRAVENOUS at 11:10

## 2020-05-04 RX ADMIN — SUCCINYLCHOLINE CHLORIDE 100 MG: 20 INJECTION, SOLUTION INTRAMUSCULAR; INTRAVENOUS; PARENTERAL at 07:33

## 2020-05-04 RX ADMIN — HYDROMORPHONE HYDROCHLORIDE 0.5 MG: 1 INJECTION, SOLUTION INTRAMUSCULAR; INTRAVENOUS; SUBCUTANEOUS at 20:38

## 2020-05-04 RX ADMIN — Medication 1500 MG: at 07:45

## 2020-05-04 RX ADMIN — MIDAZOLAM HYDROCHLORIDE 2 MG: 1 INJECTION, SOLUTION INTRAMUSCULAR; INTRAVENOUS at 07:07

## 2020-05-04 RX ADMIN — METHOCARBAMOL 500 MG: 500 TABLET, FILM COATED ORAL at 16:48

## 2020-05-04 RX ADMIN — SODIUM CHLORIDE: 9 INJECTION, SOLUTION INTRAVENOUS at 12:35

## 2020-05-04 RX ADMIN — POTASSIUM CHLORIDE, DEXTROSE MONOHYDRATE AND SODIUM CHLORIDE: 150; 5; 450 INJECTION, SOLUTION INTRAVENOUS at 14:26

## 2020-05-04 RX ADMIN — FENTANYL CITRATE 100 MCG: 50 INJECTION, SOLUTION INTRAMUSCULAR; INTRAVENOUS at 08:37

## 2020-05-04 RX ADMIN — MIDAZOLAM HYDROCHLORIDE 1 MG: 1 INJECTION, SOLUTION INTRAMUSCULAR; INTRAVENOUS at 10:19

## 2020-05-04 RX ADMIN — NEOSTIGMINE METHYLSULFATE 5 MG: 1 INJECTION, SOLUTION INTRAVENOUS at 12:44

## 2020-05-04 RX ADMIN — SODIUM CHLORIDE: 9 INJECTION, SOLUTION INTRAVENOUS at 07:20

## 2020-05-04 RX ADMIN — PHENYLEPHRINE HYDROCHLORIDE 0.25 MCG/KG/MIN: 10 INJECTION INTRAVENOUS at 11:00

## 2020-05-04 RX ADMIN — PHENYLEPHRINE HYDROCHLORIDE 100 MCG: 10 INJECTION INTRAVENOUS at 08:15

## 2020-05-04 RX ADMIN — GABAPENTIN 100 MG: 100 CAPSULE ORAL at 22:15

## 2020-05-04 RX ADMIN — CEFAZOLIN SODIUM 1 G: 2 INJECTION, SOLUTION INTRAVENOUS at 09:42

## 2020-05-04 RX ADMIN — ONDANSETRON 4 MG: 2 INJECTION INTRAMUSCULAR; INTRAVENOUS at 20:11

## 2020-05-04 RX ADMIN — CEFAZOLIN SODIUM 2 G: 2 INJECTION, SOLUTION INTRAVENOUS at 20:22

## 2020-05-04 RX ADMIN — FENTANYL CITRATE 250 MCG: 50 INJECTION, SOLUTION INTRAMUSCULAR; INTRAVENOUS at 07:33

## 2020-05-04 RX ADMIN — OXYCODONE HYDROCHLORIDE 10 MG: 5 TABLET ORAL at 23:10

## 2020-05-04 RX ADMIN — SODIUM CHLORIDE 2 UNITS/HR: 9 INJECTION, SOLUTION INTRAVENOUS at 18:56

## 2020-05-04 RX ADMIN — ALBUMIN HUMAN 500 ML: 0.05 INJECTION, SOLUTION INTRAVENOUS at 17:22

## 2020-05-04 RX ADMIN — MIDAZOLAM HYDROCHLORIDE 2 MG: 1 INJECTION, SOLUTION INTRAMUSCULAR; INTRAVENOUS at 08:37

## 2020-05-04 RX ADMIN — FENTANYL CITRATE 100 MCG: 50 INJECTION, SOLUTION INTRAMUSCULAR; INTRAVENOUS at 08:04

## 2020-05-04 RX ADMIN — FENTANYL CITRATE 100 MCG: 50 INJECTION, SOLUTION INTRAMUSCULAR; INTRAVENOUS at 08:25

## 2020-05-04 RX ADMIN — FENTANYL CITRATE 50 MCG: 50 INJECTION, SOLUTION INTRAMUSCULAR; INTRAVENOUS at 08:17

## 2020-05-04 RX ADMIN — AMINOCAPROIC ACID 5 G/HR: 250 INJECTION, SOLUTION INTRAVENOUS at 07:45

## 2020-05-04 RX ADMIN — PROPOFOL 40 MG: 10 INJECTION, EMULSION INTRAVENOUS at 07:33

## 2020-05-04 RX ADMIN — PHENYLEPHRINE HYDROCHLORIDE 100 MCG: 10 INJECTION INTRAVENOUS at 07:50

## 2020-05-04 RX ADMIN — PROPOFOL 50 MCG/KG/MIN: 10 INJECTION, EMULSION INTRAVENOUS at 12:25

## 2020-05-04 RX ADMIN — SODIUM CHLORIDE, POTASSIUM CHLORIDE, SODIUM LACTATE AND CALCIUM CHLORIDE: 600; 310; 30; 20 INJECTION, SOLUTION INTRAVENOUS at 07:06

## 2020-05-04 RX ADMIN — ACETAMINOPHEN 975 MG: 325 TABLET, FILM COATED ORAL at 16:48

## 2020-05-04 RX ADMIN — VECURONIUM BROMIDE 5 MG: 1 INJECTION, POWDER, LYOPHILIZED, FOR SOLUTION INTRAVENOUS at 09:35

## 2020-05-04 RX ADMIN — HYDROMORPHONE HYDROCHLORIDE 0.5 MG: 1 INJECTION, SOLUTION INTRAMUSCULAR; INTRAVENOUS; SUBCUTANEOUS at 15:14

## 2020-05-04 RX ADMIN — ROCURONIUM BROMIDE 50 MG: 10 INJECTION INTRAVENOUS at 08:20

## 2020-05-04 RX ADMIN — VECURONIUM BROMIDE 5 MG: 1 INJECTION, POWDER, LYOPHILIZED, FOR SOLUTION INTRAVENOUS at 10:45

## 2020-05-04 RX ADMIN — EPINEPHRINE 0.03 MCG/KG/MIN: 1 INJECTION INTRAMUSCULAR; INTRAVENOUS; SUBCUTANEOUS at 11:23

## 2020-05-04 RX ADMIN — MUPIROCIN: 20 OINTMENT TOPICAL at 06:20

## 2020-05-04 RX ADMIN — FAMOTIDINE 20 MG: 20 TABLET ORAL at 06:20

## 2020-05-04 RX ADMIN — PHENYLEPHRINE HYDROCHLORIDE 100 MCG: 10 INJECTION INTRAVENOUS at 07:37

## 2020-05-04 RX ADMIN — PHENYLEPHRINE HYDROCHLORIDE 100 MCG: 10 INJECTION INTRAVENOUS at 07:42

## 2020-05-04 RX ADMIN — PHENYLEPHRINE HYDROCHLORIDE 200 MCG: 10 INJECTION INTRAVENOUS at 08:57

## 2020-05-04 RX ADMIN — FENTANYL CITRATE 100 MCG: 50 INJECTION, SOLUTION INTRAMUSCULAR; INTRAVENOUS at 08:30

## 2020-05-04 RX ADMIN — OXYCODONE HYDROCHLORIDE 10 MG: 5 TABLET ORAL at 18:14

## 2020-05-04 RX ADMIN — ACETAMINOPHEN 975 MG: 325 TABLET, FILM COATED ORAL at 22:14

## 2020-05-04 RX ADMIN — PANTOPRAZOLE SODIUM 40 MG: 40 INJECTION, POWDER, FOR SOLUTION INTRAVENOUS at 18:14

## 2020-05-04 RX ADMIN — CEFAZOLIN SODIUM 1 G: 2 INJECTION, SOLUTION INTRAVENOUS at 11:42

## 2020-05-04 RX ADMIN — CEFAZOLIN SODIUM 2 G: 2 INJECTION, SOLUTION INTRAVENOUS at 07:45

## 2020-05-04 RX ADMIN — DIVALPROEX SODIUM 1000 MG: 500 TABLET, EXTENDED RELEASE ORAL at 23:10

## 2020-05-04 RX ADMIN — PROTAMINE SULFATE 250 MG: 10 INJECTION, SOLUTION INTRAVENOUS at 11:45

## 2020-05-04 RX ADMIN — GLYCOPYRROLATE 0.6 MG: 0.2 INJECTION, SOLUTION INTRAMUSCULAR; INTRAVENOUS at 12:44

## 2020-05-04 RX ADMIN — FENTANYL CITRATE 100 MCG: 50 INJECTION, SOLUTION INTRAMUSCULAR; INTRAVENOUS at 11:12

## 2020-05-04 RX ADMIN — SODIUM CHLORIDE, POTASSIUM CHLORIDE, SODIUM LACTATE AND CALCIUM CHLORIDE: 600; 310; 30; 20 INJECTION, SOLUTION INTRAVENOUS at 07:20

## 2020-05-04 ASSESSMENT — MIFFLIN-ST. JEOR: SCORE: 1845.4

## 2020-05-04 ASSESSMENT — ACTIVITIES OF DAILY LIVING (ADL)
ADLS_ACUITY_SCORE: 12
ADLS_ACUITY_SCORE: 12

## 2020-05-04 NOTE — OP NOTE
Procedure Date: 05/04/2020      REFERRING CARDIOLOGIST:  Sonia Kim MD      PREOPERATIVE DIAGNOSIS:  Severe mitral valve regurgitation.      POSTOPERATIVE DIAGNOSIS:  Severe mitral valve regurgitation.      SURGEON:  Evelin Coronel MD      ASSISTANT:  MENDY Patel      NAME OF OPERATION:  Right mini thoracotomy, mitral valve repair with Pomeroy-Juan Manuel NeoChords to the flail P2 segment, implantation of a 34 mm Leung Physio II annuloplasty ring, right common femoral arterial and venous cannulation for cardiopulmonary bypass, intraoperative PHILLIP.      ANESTHESIA:  General orotracheal.      INDICATIONS FOR PROCEDURE:  Mr. Payne is a very pleasant 64-year-old otherwise healthy gentleman whom I saw back in March for severe mitral valve regurgitation.  He was taken to the operating room today for a minimally invasive mitral valve repair.      OPERATIVE FINDINGS:  The patient had an overall normal LV systolic size and function.  His left atrium was severely enlarged.  He had a flail P2 segment with multiple ruptured chordae, but the anterior leaflet was normal.      DESCRIPTION OF PROCEDURE:  After informed consent was obtained, the patient was brought down to the operating room and was placed on the OR table in a supine position.  Intravenous and intra-arterial lines were begun.  While monitoring his blood pressure and EKG tracing, he was anesthetized and intubated using a single lumen endotracheal tube.  His entire chest, abdomen, both groins and legs were prepped down to the knees using multiple layers of DuraPrep.  He was draped in a sterile field.  A right mini thoracotomy incision was made, and the fourth intercostal space was entered.  A small right groin incision was made 1 cm above and parallel to the inguinal crease, and the right common femoral artery and vein were dissected out.  The patient was fully heparinized.   5-0 Prolene pursestring sutures were used to cannulate the right common femoral artery  and vein using Seldinger technique and PHILLIP guidance.  A 17-Angolan femoral arterial cannula and a 25-Angolan multistage venous cannula were used to go on cardiopulmonary bypass after appropriate ACT level was achieved.  The patient was kept normothermic during the entire operation.      Attention was then turned back towards the right mini thoracotomy incision.  An Dalton soft tissue wound retractor and the [place] instrument intercostal retractor were used to expose the hilum.  Carbon dioxide was flooded into the chest to prevent air embolism.  The right phrenic nerve was identified and protected.  The pericardium was opened anteriorly parallel and away from the phrenic nerve.  The interatrial groove was also dissected out using electrocautery.  An antegrade needle/aortic root vent was placed in the ascending aorta.  The aorta was then cross-clamped, and a liter of [place] antegrade cardioplegia was given to fully arrest the heart.  The patient went into good diastolic arrest without any LV distention.      A standard left atriotomy was then made, and the mitral valve was exposed using the [facility] left atrial lift.  Our exposure was excellent.  We began by placing annular sutures using interrupted 2-0 Ethibond sutures with an SH2 needle.  The valve was inspected.  Findings are noted above.  The ruptured chordae off the P2 segment were excised.  We decided to repair the valve by implanting artificial PTFE neochords.  The [employer] adjustable Chord-X system was brought to the field, and the system was anchored down to the posterolateral papillary muscle head.  Then, the 3 sets of NeoChords were brought through the prolapsing leaflet edge of the P2 flail segment using the figure-of-eight locking stitch method.  While filling the left ventricle with cardioplegia, we adjusted all 6 NeoChords to the appropriate length to eliminate the P2 flail.  Once we were happy with the height of the NeoChords, the suture sets were  tied to each other.  This completely eliminated the prolapse.  We tested the valve, and no residual regurgitation was seen.  We then true-sized the annulus to a 34 mm Physio II ring.  The ring was brought to the field, and all sutures were brought through the sewing ring, and the ring was seated down without difficulty.  All sutures were tied down securely using the Cor-Knot device.  The left atrium was deaired, and the atriotomy was closed in 2 layers of running 4-0 Prolene.  Antegrade hot shot was given, and the aortic crossclamp was removed.  Aortic crossclamp time was 117 minutes.  The ascending aorta was continuously vented to deair the heart.  The patient was then weaned off cardiopulmonary bypass with low-dose epinephrine and Moisés-Synephrine drips.  Total cardiopulmonary bypass time was 160 minutes.  The heart was adequately de-aired.  LV function was good.  The mitral valve repair looked good, with only trivial residual regurgitation.  There was excellent leaflet coaptation.  Once the patient remained stable off bypass, the femoral venous cannula was removed, and protamine was administered.  The femoral arterial cannula was subsequently removed as well.  The groin incision was irrigated out and was closed in layers of running Vicryl suture.  Skin was closed using 3-0 Vicryl and was sealed using Dermabond.      Mediastinal hemostasis was confirmed.  The right lung reinflated nicely.  A 24-Belizean Hardeep drain was placed in the right pleural space, and a 28-Belizean chest tube was placed in anterior mediastinum.  Temporary ventricular pacing wires were placed in the RV muscle.  These were all brought out percutaneously below the mini thoracotomy incision and secured to the skin using 2-0 Ethibond.  The ribs were reapproximated using 2 interrupted #5 Ethibond sutures.  The intercostal spaces were injected with 0.5% Marcaine for postoperative analgesia.  The mini thoracotomy incision was then closed in layers of  running Vicryl suture.  Skin was closed using 3-0 Vicryl and was sealed using Dermabond.      There were no intraoperative complications, and the patient tolerated the operation well.  No blood products were given intraoperatively.  All sponge counts, needle counts and instrument counts were correct x 2 at the end the operation.      ESTIMATED BLOOD LOSS:  Unknown.      SPECIMEN REMOVED:  Ruptured chordae.      The patient was brought to the ICU in hemodynamically stable condition and remained intubated.         THERON ABEBE MD             D: 2020   T: 2020   MT: MARIBELL      Name:     MUSHTAQ CLARKE   MRN:      [id number]        Account:        [id number]   :      1955           Procedure Date: 2020      Document: [id number]       cc: Sonia Kim MD

## 2020-05-04 NOTE — ANESTHESIA PROCEDURE NOTES
ARTERIAL LINE PROCEDURE NOTE:  Staff -   Anesthesiologist:  Sharmaine Taylor MD      Performed By: anesthesiologist         Pre-Procedure  Performed by Sharmaine Taylor MD  Location: pre-op      Pre-Anesthestic Checklist: patient identified, IV checked, risks and benefits discussed, informed consent, monitors and equipment checked and pre-op evaluation    Timeout  Correct Patient: Yes   Correct Procedure: Yes   Correct Site: Yes   Correct Laterality: N/A   Correct Position: Yes   Site Marked: N/A   .   Procedure Documentation  Procedure: arterial line    Supine  .Skin infiltrated with mL of 1% lidocaine. Injection technique: Seldinger Technique  .  .  Patient Prep/Sterile Barriers; all elements of maximal sterile barrier technique followed, mask, hat, sterile gown, sterile gloves, draped, hand hygiene, chlorhexidine gluconate and isopropyl alcohol    Assessment/Narrative    Catheter: 20 gauge, 1.75 in/4.5 cm quick cath (integral wire)          Arterial waveform: Yes IBP within 10% of NIBP: Yes

## 2020-05-04 NOTE — ANESTHESIA PROCEDURE NOTES
CENTRAL LINE INSERTION PROCEDURE NOTE:   Staff -   Anesthesiologist:  Sharmaine Taylor MD      Performed By: anesthesiologist        Pre-Procedure  Performed by Sharmaine Taylor MD  Location: pre-op      Pre-Anesthestic Checklist: patient identified, IV checked, risks and benefits discussed, informed consent, monitors and equipment checked and pre-op evaluation    Timeout  Correct Patient: Yes   Correct Procedure: Yes   Correct Site: Yes   Correct Laterality: N/A   Correct Position: Yes   Site Marked: N/A   .   Procedure Documentation   Procedure: central line  Position: Trendelenburg  Patient Prep/Sterile Barriers; chlorhexidine gluconate and isopropyl alcohol, maximum sterile barriers used during central venous catheter insertion      Insertion Site:right, internal jugular  . A permanent image is entered into the patient's record.   Skin infiltrated with mL of 1% lidocaine.  Catheter: PA Catheter  Assessment/Narrative    Catheter: PA Catheter     Secured by suture  Tegaderm and Biopatch dressing used.  blood aspirated from all lumens  All lumens flushed: Yes  Verification method: Ultrasound and Placement to be verified post-op  Comments:  Ultrasound Interpretation, central venous     1. Ultrasound guidance was used to evaluate potential access sites.  2. Ultrasound was also used to verify the patency of the vessel specified above.   3. Ultrasound was used to visualize the needle entering the vessel.   4. The visualized structures were anatomically normal.  5. There were no apparent abnormal pathological findings.  6. A permanent ultrasound image was saved in the patient's record.

## 2020-05-04 NOTE — PROGRESS NOTES
Brief ICU note:  Extubated to 4LPM via nasal cannula, after a brief PST, moaning, moving air through out.    Pulmonary toilet when able with IS/ pito Cohen CNP

## 2020-05-04 NOTE — ANESTHESIA PROCEDURE NOTES
Acute Normovolemic Hemodilution Note:   Staff -   Anesthesiologist:  Sharmaine Taylor MD    CRNA: Vickie Thayer APRN CRNA  Other Anesthesia Staff: Sharmaine Taylor MD  Performed By: CRNA and anesthesiologist        Pre-Procedure  Performed by Sharmaine Taylor MD  Location: In OR after induction      Pre-Anesthestic Checklist: patient identified, IV checked, site marked, risks and benefits discussed, informed consent, monitors and equipment checked, pre-op evaluation and at physician/surgeon's request    .   Procedure; Normovolemic Hemodilution  Date/Time of Collection:  5/4/2020 8:11 AM    Volume Collected: 44 ml  Amount Infused: 44 ml,  Amount Discarded: 0 ml  Provider Collecting: Sharmaine Taylor MD    Transfusion Date/Time: 5/4/2020 9:25 AM

## 2020-05-04 NOTE — CONSULTS
Critical Care  Note      05/04/2020    Name: Kumar Payne MRN#: [id number]   Age: 64 year old YOB: 1955     [facility]  [facility]    MV DAY #1             Problem List:   Principal Problem:    Mitral valve prolapse  Active Problems:    S/P MVR (mitral valve repair)    1. S/P MV repair (34mm physio ring), uncomplicated- no blood products, no significant electrical issues coming off, on minimal neosynephrine, and not bleeding.   2. Acute respiratory failure- 45% and +5 PEEP; will move towards extubation when fully awake.   3. History of chronic diarrhea  4. Dyslipidemia  5. Bipolar   Overall, acute and critical, but well compensated post-op. I spent 35 minutes of critical care time addressing vent support and vasoactive support.            Summary/Hospital Course:     Elective admission for routine open heart surgery       Assessment and plan :     Kumar Payne IS a 64 year old male admitted on 5/4/2020 for elective MV repair.   I have personally reviewed the daily labs, imaging studies, cultures and discussed the case with referring physician and consulting physicians.     My assessment and plan by system for this patient is as follows:    Neurology/Psychiatry:   1. History bipolar; resume routine meds    Cardiovascular:   1.Hemodynamics - compensated on minimal support     Pulmonary/Ventilator Management:   1. Move towards extubation when awake     GI and Nutrition :   1. Deferred at this moment     Renal/Fluids/Electrolytes:   1. Labs pending but no history to suggest an issue here     Infectious Disease:   1. Only prophylactic antibiotics     Endocrine:   1.  IV insulin as needed     Hematology/Oncology:   1. Follow up labs      IV/Access:   1. Venous access -   2. Arterial access -   3.  Plan  - central access required and necessary      ICU Prophylaxis:   1. DVT: Hep Subq/ LMWH/mechanical  2. VAP: HOB 30 degrees, chlorhexidine rinse  3. Stress Ulcer: PPI/H2 blocker  4. Restraints:  Nonviolent soft two point restraints required and necessary for patient safety and continued cares and good effect as patient continues to pull at necessary lines, tubes despite education and distraction. Will readdress daily.   5. Wound care  -   6. Feeding - PO's when able   7. Family Update: deferred   8. Disposition - ICU care         Key goals for next 24 hours:   1. Monitor expectantly   2. Titrate down phenylephrine  3. Extubate when ready                Medical History:     Past Medical History:   Diagnosis Date     Bipolar 1 disorder (H)      Bipolar disorder (H)     on Depakote     Mitral valve regurgitation      Past Surgical History:   Procedure Laterality Date     COLONOSCOPY N/A 4/1/2020    Procedure: Colonoscopy with biopsy;  Surgeon: Sebastián Nam MD;  Location:  GI     CV CORONARY ANGIOGRAM N/A 3/16/2020    Procedure: Coronary Angiogram;  Surgeon: Tyrone Fournier MD;  Location:  HEART CARDIAC CATH LAB     Social History     Socioeconomic History     Marital status:      Spouse name: Not on file     Number of children: Not on file     Years of education: Not on file     Highest education level: Not on file   Occupational History     Not on file   Social Needs     Financial resource strain: Not on file     Food insecurity     Worry: Not on file     Inability: Not on file     Transportation needs     Medical: Not on file     Non-medical: Not on file   Tobacco Use     Smoking status: Never Smoker     Smokeless tobacco: Never Used   Substance and Sexual Activity     Alcohol use: Yes     Alcohol/week: 0.0 standard drinks     Comment: 1-2 times a year     Drug use: No     Sexual activity: Yes     Partners: Female   Lifestyle     Physical activity     Days per week: Not on file     Minutes per session: Not on file     Stress: Not on file   Relationships     Social connections     Talks on phone: Not on file     Gets together: Not on file     Attends Spiritism service: Not on file      Active member of club or organization: Not on file     Attends meetings of clubs or organizations: Not on file     Relationship status: Not on file     Intimate partner violence     Fear of current or ex partner: Not on file     Emotionally abused: Not on file     Physically abused: Not on file     Forced sexual activity: Not on file   Other Topics Concern     Parent/sibling w/ CABG, MI or angioplasty before 65F 55M? Not Asked   Social History Narrative     Not on file      No Known Allergies           Key Medications:       acetaminophen  975 mg Oral Q8H     [START ON 5/5/2020] aspirin  325 mg Oral Daily     ceFAZolin  2 g Intravenous Q8H     gabapentin  100 mg Oral TID     [START ON 5/5/2020] heparin ANTICOAGULANT  5,000 Units Subcutaneous Q8H     insulin aspart   Subcutaneous TID w/meals     [START ON 5/5/2020] lidocaine  1-2 patch Transdermal Q24H     lidocaine   Transdermal Q8H     [START ON 5/5/2020] metoprolol tartrate  25 mg Oral Q12H    Or     [START ON 5/5/2020] metoprolol  25 mg Per NG tube Q12H     mupirocin  0.5 g Both Nostrils BID     pantoprazole (PROTONIX) IV  40 mg Intravenous Daily     polyethylene glycol  17 g Oral Daily     senna-docusate  1 tablet Oral BID    Or     senna-docusate  2 tablet Oral BID     sodium chloride (PF)  3 mL Intracatheter Q8H     vancomycin (VANCOCIN) IV  1,500 mg Intravenous Q12H       aminocaproic acid (AMICAR) infusion 7.5gm/150mL ADULT 1.25 g/hr (05/04/20 1328)     dextrose       dextrose 5% and 0.45% NaCl + KCl 20 mEq/L       EPINEPHrine IV infusion ADULT Stopped (05/04/20 1328)     insulin (regular)       nitroGLYcerin Stopped (05/04/20 1329)     phenylephrine          Home Meds  No current facility-administered medications on file prior to encounter.   aspirin (ASA) 325 MG EC tablet, Take 325 mg by mouth daily as needed for moderate pain (headache)  divalproex sodium extended-release (DEPAKOTE ER) 500 MG 24 hr tablet, Take 1,000 mg by mouth every evening (takes 2 x  500mg)               Physical Examination:   Temp:  [97.2  F (36.2  C)] 97.2  F (36.2  C)  Pulse:  [61-72] 61  Heart Rate:  [61-73] 61  Resp:  [16] 16  BP: (102-117)/(57-78) 102/57  SpO2:  [97 %-98 %] 97 %    Intake/Output Summary (Last 24 hours) at 5/4/2020 1343  Last data filed at 5/4/2020 1246  Gross per 24 hour   Intake 1720 ml   Output 1320 ml   Net 400 ml     Wt Readings from Last 4 Encounters:   05/04/20 104.3 kg (230 lb)   04/29/20 104.4 kg (230 lb 3.2 oz)   04/28/20 102.1 kg (225 lb)   04/01/20 104.3 kg (230 lb)        Ventilation Mode: CMV/AC  (Continuous Mandatory Ventilation/ Assist Control)  Rate Set (breaths/minute): 15 breaths/min  Tidal Volume Set (mL): 550 mL  PEEP (cm H2O): 5 cmH2O  Oxygen Concentration (%): 50 %  Resp: 16    No lab results found in last 7 days.    GEN: no acute distress; comfortable on vent    HEENT: head ncat, sclera anicteric, OP patent, trachea midline   PULM: unlabored synchronous with vent, clear anteriorly    CV/COR: RRR S1S2 no gallop,  No rub, trace murmur of MR; distant heart sounds   ABD: soft nontender, hypoactive bowel sounds, no mass  EXT:  Mild edema   warm  NEURO: still sedated   SKIN: no obvious rash  LINES: clean, dry intact         Data:   All data and imaging reviewed     ROUTINE ICU LABS (Last four results)  CMP  Recent Labs   Lab 05/04/20  1200 04/28/20  1011    141   POTASSIUM 4.3 4.1   CHLORIDE 109 111*   CO2 22 23   ANIONGAP 10 7   * 84   BUN 10 12   CR 0.80 0.85   GFRESTIMATED >90 >90   GFRESTBLACK >90 >90   RUEL 8.6 8.7   PROTTOTAL  --  6.9   ALBUMIN  --  3.8   BILITOTAL  --  0.5   ALKPHOS  --  85   AST  --  20   ALT  --  36     CBC  Recent Labs   Lab 05/04/20  1200 04/28/20  1011   WBC 27.9* 8.3   RBC 4.22* 4.79   HGB 12.9* 14.3   HCT 37.7* 42.9   MCV 89 90   MCH 30.6 29.9   MCHC 34.2 33.3   RDW 13.2 13.1    268     INR  Recent Labs   Lab 05/04/20  1200   INR 1.48*     Arterial Blood GasNo lab results found in last 7 days.    All  cultures:  No results for input(s): CULT in the last 168 hours.  Recent Results (from the past 24 hour(s))   Transesophageal Echocardiogram    Narrative    [id number]  [facility]  [id number]  [id number]^ANDRAE^THERON^DANUTA           [facility]  Echocardiography Laboratory  [address] [place] [address]        Name: MUSHTAQ CLARKE  MRN: [id number]  : 1955  Study Date: 2020 08:34 AM  Age: 64 yrs  Gender: Male  Patient Location: [facility]  Reason For Study: Other, Please Specify in Comments  Ordering Physician: THERON ABEBE  Referring Physician: KAMI GRAMAJO  Performed By: YUMIKO Lawler     BSA: 2.2 m2  Height: 71 in  Weight: 230 lb  HR: 62  BP: 102/57 mmHg  _____________________________________________________________________________  __        Procedure  Complete OR PHILLIP Adult.  _____________________________________________________________________________  __        Interpretation Summary     Status post mitral valve repair with a Physio annuloplasty ring 34mm in place.  Trivial insufficiency.     Compared to a TTE from 2020, there is no longer severe MR present, and  there is now evidence of MV repair with annuloplasty ring placement.  _____________________________________________________________________________  __        PHILLIP  Intra-procedure mitral valve repair PHILLIP. Consent, probe intubation, sedation  done per Anesthesiology. The transesophageal probe was passed without  difficulty.     Left Ventricle  Left ventricular systolic function is normal. The visual ejection fraction is  estimated at 60-65%.     Right Ventricle  The right ventricular systolic function is normal.     Atria  The left atrium is mildly dilated. Right atrial size is normal. There is no  atrial shunt seen. The left atrial appendage is not well visualized.        Mitral Valve  An annuloplasty ring is noted in the mitral position. Status post mitral valve  repair with a Physio annuloplasty ring  34mm in place. Trivial insufficiency.     Tricuspid Valve  The tricuspid valve is normal in structure and function.     Aortic Valve  The aortic valve is normal in structure and function.     Pulmonic Valve  The pulmonic valve is normal in structure and function.     Vessels  The aortic root is not well visualized.        Pericardial/Pleural  There is no pericardial effusion.  _____________________________________________________________________________  __                                Report approved by: Mary Rao 05/04/2020 01:27 PM                    _____________________________________________________________________________  __            MD Talib Perez: This patient is critically ill: Yes. Total critical care time today 35 min.

## 2020-05-04 NOTE — PROGRESS NOTES
Extubation Note    Successful completion of SBT (Yes or No):Yes  Extubation time: 15:40    Patient assessment:  Lung sounds:Clear  Stridor Present (Yes or No): No  Patient tolerance: Good    Oxygen device:  Liter flow:4 lpm  FiO2:  SpO2: 98%    Plan: To start Aerobika and I.S. as soon as possible.

## 2020-05-04 NOTE — ANESTHESIA CARE TRANSFER NOTE
Patient: Kumar Payne    Procedure(s):  MINIMALLY INVASIVE MITRAL VALVE REPAIR WITH PHYSIO RING SIZE 34 MM, ON PUMP WITH PHILLIP READ BY DR MOISE (CARDIOLOGY).    Diagnosis: Mitral valve prolapse [I34.1]  Diagnosis Additional Information: No value filed.    Anesthesia Type:   General     Note:  Airway :ETT  Patient transferred to:ICU  Comments: Pt transported to ICU, ETT in place, ventilated via ambu with 10 L O2  All necessary monitors on, alarms audible.  Placed on vent in ICU per RT, Vt 500 mL, rate 15, peep 5  Report given to RN, VSS.ICU Handoff: Call for PAUSE to initiate/utilize ICU HANDOFF, Identified Patient, Identified Responsible Provider, Reviewed the Pertinent Medical History, Discussed Surgical Course, Reviewed Intra-OP Anesthesia Management and Issues during Anesthesia, Set Expectations for Post Procedure Period and Allowed Opportunity for Questions and Acknowledgement of Understanding      Vitals: (Last set prior to Anesthesia Care Transfer)    CRNA VITALS  5/4/2020 1228 - 5/4/2020 1315      5/4/2020             Resp Rate (observed):  (!) 2    Resp Rate (set):  16                Electronically Signed By: MARGIE Yoon CRNA  May 4, 2020  1:15 PM

## 2020-05-04 NOTE — BRIEF OP NOTE
[facility]    Brief Operative Note    Pre-operative diagnosis: Mitral valve prolapse [I34.1]  Post-operative diagnosis Same as pre-operative diagnosis    Procedure: Procedure(s):  MINIMALLY INVASIVE MITRAL VALVE REPAIR WITH PHYSIO RING SIZE 34 MM, ON PUMP WITH PHILLIP READ BY DR MOISE (CARDIOLOGY).  Surgeon: Surgeon(s) and Role:     * Evelin Coronel MD - Primary     * Michaela Cullen PA-C - Assisting  Anesthesia: General   Estimated blood loss: 720 mL  Drains: 2 right pleural chest tubes  Specimens: * No specimens in log *  Findings:   flail p2, torn chordae.  Complications: None.  Implants:   Implant Name Type Inv. Item Serial No.  Lot No. LRB No. Used Action   [employer] PHYSIO II ANNULOPLASTY RING SIZE M34   [id number] [employer]  N/A 1 Implanted

## 2020-05-04 NOTE — CONSULTS
CARDIAC SURGERY NUTRITION CONSULT    Received standing order to assess and educate patient.    Will follow and complete assessment once patient is extubated and/or is transferred to medical unit.    Patient will receive nutrition education during the Outpatient Cardiac Rehab Program (nutrition classes/dietitian counseling).    Lauren Parkinson, MIGUELANGEL, LD, [facility]

## 2020-05-04 NOTE — ANESTHESIA POSTPROCEDURE EVALUATION
Patient: Kumar Payne    Procedure(s):  MINIMALLY INVASIVE MITRAL VALVE REPAIR WITH PHYSIO RING SIZE 34 MM, ON PUMP WITH PHILLIP READ BY DR MOISE (CARDIOLOGY).    Diagnosis:Mitral valve prolapse [I34.1]  Diagnosis Additional Information: No value filed.    Anesthesia Type:  General    Note:  Anesthesia Post Evaluation    Patient location during evaluation: ICU  Patient participation: Unable to evaluate secondary to administered sedation  Pain management: adequate  Airway patency: patent  Cardiovascular status: hemodynamically stable  Respiratory status: ventilator and ETT  Hydration status: acceptable  PONV: none     Anesthetic complications: None          Last vitals:  Vitals:    05/04/20 0559 05/04/20 0709   BP: 117/78 102/57   Pulse: 72 61   Resp: 16 16   Temp: 36.2  C (97.2  F)    SpO2: 98% 97%         Electronically Signed By: Sharmaine Taylor MD, MD  May 4, 2020  1:14 PM

## 2020-05-05 ENCOUNTER — APPOINTMENT (OUTPATIENT)
Dept: GENERAL RADIOLOGY | Facility: CLINIC | Age: 65
DRG: 219 | End: 2020-05-05
Attending: PHYSICIAN ASSISTANT
Payer: COMMERCIAL

## 2020-05-05 ENCOUNTER — APPOINTMENT (OUTPATIENT)
Dept: PHYSICAL THERAPY | Facility: CLINIC | Age: 65
DRG: 219 | End: 2020-05-05
Attending: PHYSICIAN ASSISTANT
Payer: COMMERCIAL

## 2020-05-05 DIAGNOSIS — I34.1 MITRAL VALVE PROLAPSE: Primary | ICD-10-CM

## 2020-05-05 LAB
ABO + RH BLD: NORMAL
ABO + RH BLD: NORMAL
ALBUMIN SERPL-MCNC: 3.5 G/DL (ref 3.4–5)
ALP SERPL-CCNC: 58 U/L (ref 40–150)
ALT SERPL W P-5'-P-CCNC: 41 U/L (ref 0–70)
ANION GAP SERPL CALCULATED.3IONS-SCNC: 6 MMOL/L (ref 3–14)
AST SERPL W P-5'-P-CCNC: 108 U/L (ref 0–45)
BASE DEFICIT BLDA-SCNC: 1.7 MMOL/L
BASE DEFICIT BLDA-SCNC: 2.5 MMOL/L
BASE DEFICIT BLDA-SCNC: 2.5 MMOL/L
BASE DEFICIT BLDA-SCNC: 3.8 MMOL/L
BASE DEFICIT BLDA-SCNC: 3.9 MMOL/L
BASE DEFICIT BLDA-SCNC: 4 MMOL/L
BASE DEFICIT BLDA-SCNC: 4.3 MMOL/L
BASE DEFICIT BLDA-SCNC: 4.5 MMOL/L
BASE DEFICIT BLDV-SCNC: 0.7 MMOL/L
BILIRUB SERPL-MCNC: 0.5 MG/DL (ref 0.2–1.3)
BLD GP AB SCN SERPL QL: NORMAL
BLD PROD TYP BPU: NORMAL
BLD UNIT ID BPU: 0
BLOOD BANK CMNT PATIENT-IMP: NORMAL
BLOOD PRODUCT CODE: NORMAL
BPU ID: NORMAL
BUN SERPL-MCNC: 16 MG/DL (ref 7–30)
CA-I BLD-MCNC: 3.8 MG/DL (ref 4.4–5.2)
CA-I BLD-MCNC: 3.8 MG/DL (ref 4.4–5.2)
CA-I BLD-MCNC: 4 MG/DL (ref 4.4–5.2)
CA-I BLD-MCNC: 4.1 MG/DL (ref 4.4–5.2)
CA-I BLD-MCNC: 4.2 MG/DL (ref 4.4–5.2)
CA-I BLD-MCNC: 4.5 MG/DL (ref 4.4–5.2)
CA-I BLD-MCNC: 4.8 MG/DL (ref 4.4–5.2)
CALCIUM SERPL-MCNC: 7.8 MG/DL (ref 8.5–10.1)
CHLORIDE SERPL-SCNC: 109 MMOL/L (ref 94–109)
CO2 SERPL-SCNC: 23 MMOL/L (ref 20–32)
CREAT SERPL-MCNC: 0.86 MG/DL (ref 0.66–1.25)
ERYTHROCYTE [DISTWIDTH] IN BLOOD BY AUTOMATED COUNT: 13.6 % (ref 10–15)
GFR SERPL CREATININE-BSD FRML MDRD: 90 ML/MIN/{1.73_M2}
GLUCOSE BLDC GLUCOMTR-MCNC: 117 MG/DL (ref 70–99)
GLUCOSE BLDC GLUCOMTR-MCNC: 126 MG/DL (ref 70–99)
GLUCOSE BLDC GLUCOMTR-MCNC: 139 MG/DL (ref 70–99)
GLUCOSE BLDC GLUCOMTR-MCNC: 144 MG/DL (ref 70–99)
GLUCOSE BLDC GLUCOMTR-MCNC: 148 MG/DL (ref 70–99)
GLUCOSE BLDC GLUCOMTR-MCNC: 149 MG/DL (ref 70–99)
GLUCOSE BLDC GLUCOMTR-MCNC: 158 MG/DL (ref 70–99)
GLUCOSE SERPL-MCNC: 147 MG/DL (ref 70–99)
HCO3 BLD-SCNC: 23 MMOL/L (ref 21–28)
HCO3 BLD-SCNC: 24 MMOL/L (ref 21–28)
HCO3 BLD-SCNC: 24 MMOL/L (ref 21–28)
HCO3 BLD-SCNC: 25 MMOL/L (ref 21–28)
HCO3 BLD-SCNC: 26 MMOL/L (ref 21–28)
HCO3 BLDV-SCNC: 26 MMOL/L (ref 21–28)
HCT VFR BLD AUTO: 39.5 % (ref 40–53)
HGB BLD-MCNC: 12.9 G/DL (ref 13.3–17.7)
LACTATE BLD-SCNC: 1.5 MMOL/L (ref 0.7–2)
LACTATE BLD-SCNC: 1.6 MMOL/L (ref 0.7–2)
LACTATE BLD-SCNC: 1.8 MMOL/L (ref 0.7–2)
LACTATE BLD-SCNC: 1.9 MMOL/L (ref 0.7–2)
LACTATE BLD-SCNC: 2.2 MMOL/L (ref 0.7–2)
LACTATE BLD-SCNC: 2.6 MMOL/L (ref 0.7–2)
LACTATE BLD-SCNC: 3.5 MMOL/L (ref 0.7–2)
MAGNESIUM SERPL-MCNC: 2.5 MG/DL (ref 1.6–2.3)
MCH RBC QN AUTO: 29.7 PG (ref 26.5–33)
MCHC RBC AUTO-ENTMCNC: 32.7 G/DL (ref 31.5–36.5)
MCV RBC AUTO: 91 FL (ref 78–100)
NUM BPU REQUESTED: 4
O2/TOTAL GAS SETTING VFR VENT: 100 %
O2/TOTAL GAS SETTING VFR VENT: 100 %
O2/TOTAL GAS SETTING VFR VENT: 70 %
O2/TOTAL GAS SETTING VFR VENT: 80 %
O2/TOTAL GAS SETTING VFR VENT: 80 %
OXYHGB MFR BLD: 100 % (ref 92–100)
OXYHGB MFR BLD: >100 % (ref 92–100)
OXYHGB MFR BLDV: 80 %
PCO2 BLD: 43 MM HG (ref 35–45)
PCO2 BLD: 43 MM HG (ref 35–45)
PCO2 BLD: 46 MM HG (ref 35–45)
PCO2 BLD: 49 MM HG (ref 35–45)
PCO2 BLD: 50 MM HG (ref 35–45)
PCO2 BLD: 51 MM HG (ref 35–45)
PCO2 BLD: 55 MM HG (ref 35–45)
PCO2 BLD: 70 MM HG (ref 35–45)
PCO2 BLDV: 51 MM HG (ref 40–50)
PH BLD: 7.17 PH (ref 7.35–7.45)
PH BLD: 7.25 PH (ref 7.35–7.45)
PH BLD: 7.27 PH (ref 7.35–7.45)
PH BLD: 7.27 PH (ref 7.35–7.45)
PH BLD: 7.29 PH (ref 7.35–7.45)
PH BLD: 7.3 PH (ref 7.35–7.45)
PH BLD: 7.34 PH (ref 7.35–7.45)
PH BLD: 7.35 PH (ref 7.35–7.45)
PH BLDV: 7.32 PH (ref 7.32–7.43)
PHOSPHATE SERPL-MCNC: 2.8 MG/DL (ref 2.5–4.5)
PLATELET # BLD AUTO: 194 10E9/L (ref 150–450)
PO2 BLD: 191 MM HG (ref 80–105)
PO2 BLD: 278 MM HG (ref 80–105)
PO2 BLD: 345 MM HG (ref 80–105)
PO2 BLD: 347 MM HG (ref 80–105)
PO2 BLD: 349 MM HG (ref 80–105)
PO2 BLD: 357 MM HG (ref 80–105)
PO2 BLD: 407 MM HG (ref 80–105)
PO2 BLD: 508 MM HG (ref 80–105)
PO2 BLDV: 45 MM HG (ref 25–47)
POTASSIUM SERPL-SCNC: 5.1 MMOL/L (ref 3.4–5.3)
PROT SERPL-MCNC: 6.2 G/DL (ref 6.8–8.8)
RBC # BLD AUTO: 4.34 10E12/L (ref 4.4–5.9)
SODIUM SERPL-SCNC: 138 MMOL/L (ref 133–144)
SPECIMEN EXP DATE BLD: NORMAL
TRANSFUSION STATUS PATIENT QL: NORMAL
WBC # BLD AUTO: 28.5 10E9/L (ref 4–11)

## 2020-05-05 PROCEDURE — 83735 ASSAY OF MAGNESIUM: CPT | Performed by: PHYSICIAN ASSISTANT

## 2020-05-05 PROCEDURE — 71045 X-RAY EXAM CHEST 1 VIEW: CPT

## 2020-05-05 PROCEDURE — 00000146 ZZHCL STATISTIC GLUCOSE BY METER IP

## 2020-05-05 PROCEDURE — 12000000 ZZH R&B MED SURG/OB

## 2020-05-05 PROCEDURE — 85027 COMPLETE CBC AUTOMATED: CPT | Performed by: PHYSICIAN ASSISTANT

## 2020-05-05 PROCEDURE — P9041 ALBUMIN (HUMAN),5%, 50ML: HCPCS | Performed by: PHYSICIAN ASSISTANT

## 2020-05-05 PROCEDURE — 25000128 H RX IP 250 OP 636: Performed by: PHYSICIAN ASSISTANT

## 2020-05-05 PROCEDURE — 25000132 ZZH RX MED GY IP 250 OP 250 PS 637: Performed by: PHYSICIAN ASSISTANT

## 2020-05-05 PROCEDURE — 25000128 H RX IP 250 OP 636: Performed by: SURGERY

## 2020-05-05 PROCEDURE — 93005 ELECTROCARDIOGRAM TRACING: CPT

## 2020-05-05 PROCEDURE — 97530 THERAPEUTIC ACTIVITIES: CPT | Mod: GP

## 2020-05-05 PROCEDURE — C9113 INJ PANTOPRAZOLE SODIUM, VIA: HCPCS | Performed by: PHYSICIAN ASSISTANT

## 2020-05-05 PROCEDURE — 80053 COMPREHEN METABOLIC PANEL: CPT | Performed by: PHYSICIAN ASSISTANT

## 2020-05-05 PROCEDURE — 25000131 ZZH RX MED GY IP 250 OP 636 PS 637: Performed by: PHYSICIAN ASSISTANT

## 2020-05-05 PROCEDURE — 93010 ELECTROCARDIOGRAM REPORT: CPT | Performed by: INTERNAL MEDICINE

## 2020-05-05 PROCEDURE — 97161 PT EVAL LOW COMPLEX 20 MIN: CPT | Mod: GP

## 2020-05-05 PROCEDURE — 97110 THERAPEUTIC EXERCISES: CPT | Mod: GP

## 2020-05-05 PROCEDURE — 25800030 ZZH RX IP 258 OP 636: Performed by: PHYSICIAN ASSISTANT

## 2020-05-05 PROCEDURE — 84100 ASSAY OF PHOSPHORUS: CPT | Performed by: PHYSICIAN ASSISTANT

## 2020-05-05 PROCEDURE — 82330 ASSAY OF CALCIUM: CPT | Performed by: PHYSICIAN ASSISTANT

## 2020-05-05 RX ORDER — PANTOPRAZOLE SODIUM 40 MG/1
40 TABLET, DELAYED RELEASE ORAL
Status: DISCONTINUED | OUTPATIENT
Start: 2020-05-06 | End: 2020-05-09 | Stop reason: HOSPADM

## 2020-05-05 RX ORDER — ALBUMIN, HUMAN INJ 5% 5 %
500 SOLUTION INTRAVENOUS ONCE
Status: COMPLETED | OUTPATIENT
Start: 2020-05-05 | End: 2020-05-05

## 2020-05-05 RX ORDER — KETOROLAC TROMETHAMINE 15 MG/ML
15 INJECTION, SOLUTION INTRAMUSCULAR; INTRAVENOUS EVERY 6 HOURS PRN
Status: DISCONTINUED | OUTPATIENT
Start: 2020-05-05 | End: 2020-05-09 | Stop reason: HOSPADM

## 2020-05-05 RX ORDER — KETOROLAC TROMETHAMINE 15 MG/ML
15 INJECTION, SOLUTION INTRAMUSCULAR; INTRAVENOUS EVERY 6 HOURS PRN
Status: DISCONTINUED | OUTPATIENT
Start: 2020-05-05 | End: 2020-05-05

## 2020-05-05 RX ADMIN — OXYCODONE HYDROCHLORIDE 10 MG: 5 TABLET ORAL at 15:31

## 2020-05-05 RX ADMIN — ALBUMIN HUMAN 500 ML: 0.05 INJECTION, SOLUTION INTRAVENOUS at 13:09

## 2020-05-05 RX ADMIN — GABAPENTIN 100 MG: 100 CAPSULE ORAL at 09:07

## 2020-05-05 RX ADMIN — HEPARIN SODIUM 5000 UNITS: 5000 INJECTION, SOLUTION INTRAVENOUS; SUBCUTANEOUS at 22:04

## 2020-05-05 RX ADMIN — VANCOMYCIN HYDROCHLORIDE 1500 MG: 5 INJECTION, POWDER, LYOPHILIZED, FOR SOLUTION INTRAVENOUS at 09:08

## 2020-05-05 RX ADMIN — OXYCODONE HYDROCHLORIDE 10 MG: 5 TABLET ORAL at 05:18

## 2020-05-05 RX ADMIN — SENNOSIDES AND DOCUSATE SODIUM 1 TABLET: 8.6; 5 TABLET ORAL at 09:06

## 2020-05-05 RX ADMIN — LIDOCAINE 1 PATCH: 560 PATCH PERCUTANEOUS; TOPICAL; TRANSDERMAL at 09:07

## 2020-05-05 RX ADMIN — KETOROLAC TROMETHAMINE 15 MG: 15 INJECTION, SOLUTION INTRAMUSCULAR; INTRAVENOUS at 09:06

## 2020-05-05 RX ADMIN — ACETAMINOPHEN 975 MG: 325 TABLET, FILM COATED ORAL at 05:49

## 2020-05-05 RX ADMIN — METOPROLOL TARTRATE 25 MG: 25 TABLET, FILM COATED ORAL at 20:19

## 2020-05-05 RX ADMIN — ACETAMINOPHEN 975 MG: 325 TABLET, FILM COATED ORAL at 14:30

## 2020-05-05 RX ADMIN — POLYETHYLENE GLYCOL 3350 17 G: 17 POWDER, FOR SOLUTION ORAL at 09:07

## 2020-05-05 RX ADMIN — HYDROMORPHONE HYDROCHLORIDE 0.3 MG: 1 INJECTION, SOLUTION INTRAMUSCULAR; INTRAVENOUS; SUBCUTANEOUS at 22:20

## 2020-05-05 RX ADMIN — OXYCODONE HYDROCHLORIDE 10 MG: 5 TABLET ORAL at 20:19

## 2020-05-05 RX ADMIN — DIVALPROEX SODIUM 1000 MG: 500 TABLET, EXTENDED RELEASE ORAL at 21:59

## 2020-05-05 RX ADMIN — GABAPENTIN 100 MG: 100 CAPSULE ORAL at 21:59

## 2020-05-05 RX ADMIN — POTASSIUM CHLORIDE, DEXTROSE MONOHYDRATE AND SODIUM CHLORIDE: 150; 5; 450 INJECTION, SOLUTION INTRAVENOUS at 14:36

## 2020-05-05 RX ADMIN — CEFAZOLIN SODIUM 2 G: 2 INJECTION, SOLUTION INTRAVENOUS at 04:30

## 2020-05-05 RX ADMIN — HYDROMORPHONE HYDROCHLORIDE 0.5 MG: 1 INJECTION, SOLUTION INTRAMUSCULAR; INTRAVENOUS; SUBCUTANEOUS at 01:49

## 2020-05-05 RX ADMIN — VANCOMYCIN HYDROCHLORIDE 1500 MG: 5 INJECTION, POWDER, LYOPHILIZED, FOR SOLUTION INTRAVENOUS at 00:14

## 2020-05-05 RX ADMIN — SENNOSIDES AND DOCUSATE SODIUM 1 TABLET: 8.6; 5 TABLET ORAL at 20:19

## 2020-05-05 RX ADMIN — METOPROLOL TARTRATE 25 MG: 25 TABLET, FILM COATED ORAL at 09:06

## 2020-05-05 RX ADMIN — OXYCODONE HYDROCHLORIDE 10 MG: 5 TABLET ORAL at 11:23

## 2020-05-05 RX ADMIN — HYDROMORPHONE HYDROCHLORIDE 0.3 MG: 1 INJECTION, SOLUTION INTRAMUSCULAR; INTRAVENOUS; SUBCUTANEOUS at 05:50

## 2020-05-05 RX ADMIN — HEPARIN SODIUM 5000 UNITS: 5000 INJECTION, SOLUTION INTRAVENOUS; SUBCUTANEOUS at 11:49

## 2020-05-05 RX ADMIN — METHOCARBAMOL 500 MG: 500 TABLET, FILM COATED ORAL at 18:05

## 2020-05-05 RX ADMIN — PANTOPRAZOLE SODIUM 40 MG: 40 INJECTION, POWDER, FOR SOLUTION INTRAVENOUS at 09:07

## 2020-05-05 RX ADMIN — CEFAZOLIN SODIUM 2 G: 2 INJECTION, SOLUTION INTRAVENOUS at 11:50

## 2020-05-05 RX ADMIN — GABAPENTIN 100 MG: 100 CAPSULE ORAL at 15:31

## 2020-05-05 RX ADMIN — ASPIRIN 325 MG: 325 TABLET, COATED ORAL at 09:07

## 2020-05-05 RX ADMIN — INSULIN ASPART 1 UNITS: 100 INJECTION, SOLUTION INTRAVENOUS; SUBCUTANEOUS at 11:46

## 2020-05-05 ASSESSMENT — MIFFLIN-ST. JEOR
SCORE: 1821.13
SCORE: 1857.13

## 2020-05-05 ASSESSMENT — ACTIVITIES OF DAILY LIVING (ADL)
ADLS_ACUITY_SCORE: 13
ADLS_ACUITY_SCORE: 15

## 2020-05-05 NOTE — PROGRESS NOTES
05/05/20 1000   Quick Adds   Type of Visit Initial PT Evaluation   Living Environment   Lives With spouse   Living Arrangements house   Home Accessibility stairs to enter home;stairs within home   Number of Stairs, Main Entrance 3   Stair Railings, Main Entrance railings safe and in good condition;railing on right side (ascending)   Number of Stairs, Within Home, Primary other (see comments)  (14)   Stair Railings, Within Home, Primary railings safe and in good condition;railing on right side (ascending)   Transportation Anticipated car, drives self;family or friend will provide   Self-Care   Usual Activity Tolerance good   Current Activity Tolerance moderate   Regular Exercise No   Equipment Currently Used at Home none   Functional Level Prior   Ambulation 0-->independent   Transferring 0-->independent   Fall history within last six months no   Which of the above functional risks had a recent onset or change? none   Prior Functional Level Comment Pt reports independence with mobility prior to admit, no use of AD.   General Information   Onset of Illness/Injury or Date of Surgery - Date 05/04/20   Referring Physician Dr. Coronel   Patient/Family Goals Statement To go home   Pertinent History of Current Problem (include personal factors and/or comorbidities that impact the POC) Pt is a 64 year old male admitted for MVR.   Cognitive Status Examination   Orientation orientation to person, place and time   Level of Consciousness alert   Follows Commands and Answers Questions 100% of the time   Range of Motion (ROM)   ROM Quick Adds No deficits were identified   Strength   Manual Muscle Testing Quick Adds No deficits were identified   Bed Mobility   Bed Mobility Comments NT, up in chair   Transfer Skills   Transfer Comments Min assist   Gait   Gait Comments 10' FWW min assist, decreased yung and step length   Balance   Balance Comments Good in sitting, fair in standing   General Therapy Interventions   Planned  "Therapy Interventions bed mobility training;gait training;transfer training;progressive activity/exercise;home program guidelines;risk factor education   Clinical Impression   Criteria for Skilled Therapeutic Intervention yes, treatment indicated   PT Diagnosis Impaired ambulation   Influenced by the following impairments Decreased endurance and balance   Functional limitations due to impairments Difficulty with bed mobility, transfers, ambulation, stair management   Clinical Presentation Stable/Uncomplicated   Clinical Presentation Rationale VSS, pain controlled   Clinical Decision Making (Complexity) Low complexity   Therapy Frequency 2x/day   Predicted Duration of Therapy Intervention (days/wks) 5 days   Anticipated Discharge Disposition Home with Outpatient Therapy   Risk & Benefits of therapy have been explained Yes   Patient, Family & other staff in agreement with plan of care Yes   [facility] Talk[employer] TM \"6 Clicks\"   2016, Trustees of [facility], under license to [employer].  All rights reserved.   6 Clicks Short Forms Basic Mobility Inpatient Short Form   [facility] [employer]  \"6 Clicks\" V.2 Basic Mobility Inpatient Short Form   1. Turning from your back to your side while in a flat bed without using bedrails? 3 - A Little   2. Moving from lying on your back to sitting on the side of a flat bed without using bedrails? 3 - A Little   3. Moving to and from a bed to a chair (including a wheelchair)? 3 - A Little   4. Standing up from a chair using your arms (e.g., wheelchair, or bedside chair)? 3 - A Little   5. To walk in hospital room? 3 - A Little   6. Climbing 3-5 steps with a railing? 2 - A Lot   Basic Mobility Raw Score (Score out of 24.Lower scores equate to lower levels of function) 17   Total Evaluation Time   Total Evaluation Time (Minutes) 12     "

## 2020-05-05 NOTE — PROGRESS NOTES
Attestation signed by Evelin Coronel MD at 2020 11:29 AM   Attestation:  Physician Attestation   I, Evelin Coronel, saw and evaluated Kumar Payne as part of a shared visit.  I have reviewed and discussed with the advanced practice provider their history, physical and plan.     I personally reviewed the vital signs, medications, labs, and imaging.     My key history or physical exam findings: s/p mini mitral valve repair.     Key management decisions made by me: transfer to  today.     Evelin Coronel  Date of Service (when I saw the patient): 20         [facility]  Cardiovascular and Thoracic Surgery Daily Note          Assessment and Plan:   POD#1 s/p Right mini thoracotomy, mitral valve repair with [place]-Juan Manuel NeoChords to the flail P2 segment, implantation of a 34 mm Leung Physio II annuloplasty ring, right common femoral arterial and venous cannulation for cardiopulmonary bypass, intraoperative PHILLIP by Dr. Evelin Coronel  -CVS: HR: 80s-100s. SBP: 90s-110s. Weaned off Moisés. Pre op EF: 60-65%. ASA, BB. Chest tubes to suction. Pacer wires capped  -Resp: Extubated within protocol. Saturating well on 2L. Continue to encourage IS, cough, deep breathing, ambulation.   -Neuro:  Grossly intact. Pain better controlled with current regimen, toradol added this am.   -Renal: good UO, below preoperative weight. Will continue to monitor.  Creatinine   Date Value Ref Range Status   2020 0.86 0.66 - 1.25 mg/dL Final   ]  -GI: Has not attempted to eat yet this am, nausea overnight. No BM. Continue bowel regimen  -:  Cotton in place d/t limited mobility and need for accurate I&Os.  -Endo: pre op a1c: 5.5. Minimal insulin gtt requirements. Transition to sliding scale insulin.   -FEN: replete lytes as needed, Na: 138. K: 5.1  Orders Placed This Encounter      Clear Liquid Diet Thin Liquids (water, ice chips, juice, milk, gelatin, ice cream, etc)    -ID: Temp (24hrs), Av.5  F  (37.5  C), Min:97  F (36.1  C), Max:101.1  F (38.4  C)   Completed perioperative abx.  WBC   Date Value Ref Range Status   05/05/2020 28.5 (H) 4.0 - 11.0 10e9/L Final   ]  -Heme: plt: 194. Acute blood loss anemia and thrombocytopenia  Hemoglobin   Date Value Ref Range Status   05/05/2020 12.9 (L) 13.3 - 17.7 g/dL Final   ],   -Proph: PCD, ASA, BB, statin, PPI, sub q heparin  -Dispo: Transfer to [facility]. Initiate therapies. Continue to encourage IS, cough, deep breathing, ambulation.           Interval History:   Extubated, weaned off gtts. Saturating well on 2L. Pain controlled. Has not attempted to eat anything yet today. No BM.          Medications:       acetaminophen  975 mg Oral Q8H     aspirin  325 mg Oral Daily     ceFAZolin  2 g Intravenous Q8H     divalproex sodium extended-release  1,000 mg Oral At Bedtime     gabapentin  100 mg Oral TID     heparin ANTICOAGULANT  5,000 Units Subcutaneous Q8H     insulin aspart  1-7 Units Subcutaneous TID AC     insulin aspart  1-5 Units Subcutaneous At Bedtime     lidocaine  1-2 patch Transdermal Q24H     lidocaine   Transdermal Q8H     metoprolol tartrate  25 mg Oral Q12H    Or     metoprolol  25 mg Per NG tube Q12H     [START ON 5/6/2020] pantoprazole  40 mg Oral QAM AC     polyethylene glycol  17 g Oral Daily     senna-docusate  1 tablet Oral BID    Or     senna-docusate  2 tablet Oral BID     sodium chloride (PF)  3 mL Intracatheter Q8H     vancomycin (VANCOCIN) IV  1,500 mg Intravenous Q12H     [START ON 5/7/2020] acetaminophen, dextrose, glucose **OR** dextrose **OR** glucagon, hydrALAZINE, HYDROmorphone, ketorolac, lidocaine 4%, lidocaine (buffered or not buffered), magnesium sulfate, magnesium sulfate, melatonin, meperidine, methocarbamol, naloxone, ondansetron **OR** ondansetron, oxyCODONE IR, potassium chloride, potassium chloride with lidocaine, potassium chloride, potassium chloride, potassium chloride, potassium phosphate (KPHOS) in D5W IV, potassium phosphate  "(KPHOS) in D5W IV, potassium phosphate (KPHOS) in D5W IV, potassium phosphate (KPHOS) in D5W IV, potassium phosphate (KPHOS) in D5W IV, prochlorperazine **OR** prochlorperazine, sodium chloride (PF)          Physical Exam:   Vitals were reviewed  Blood pressure 91/67, pulse 85, temperature 99.9  F (37.7  C), resp. rate 16, height 1.803 m (5' 11\"), weight 101.9 kg (224 lb 10.4 oz), SpO2 93 %.  Rhythm: NSR    Lungs: diminished bases    Cardiovascular: RRR normal s1 and s2    Abdomen: soft NTND    Extremeties: minimal edema    Incision: CDI    CT: to suction    Weight:   Vitals:    05/04/20 0559 05/05/20 0400   Weight: 104.3 kg (230 lb) 101.9 kg (224 lb 10.4 oz)            Data:   Labs:   Lab Results   Component Value Date    WBC 28.5 05/05/2020     Lab Results   Component Value Date    RBC 4.34 05/05/2020     Lab Results   Component Value Date    HGB 12.9 05/05/2020     Lab Results   Component Value Date    HCT 39.5 05/05/2020     No components found for: MCT  Lab Results   Component Value Date    MCV 91 05/05/2020     Lab Results   Component Value Date    MCH 29.7 05/05/2020     Lab Results   Component Value Date    MCHC 32.7 05/05/2020     Lab Results   Component Value Date    RDW 13.6 05/05/2020     Lab Results   Component Value Date     05/05/2020       Last Basic Metabolic Panel:  Lab Results   Component Value Date     05/05/2020      Lab Results   Component Value Date    POTASSIUM 5.1 05/05/2020     Lab Results   Component Value Date    CHLORIDE 109 05/05/2020     Lab Results   Component Value Date    RUEL 7.8 05/05/2020     Lab Results   Component Value Date    CO2 23 05/05/2020     Lab Results   Component Value Date    BUN 16 05/05/2020     Lab Results   Component Value Date    CR 0.86 05/05/2020     Lab Results   Component Value Date     05/05/2020       CXR: 5/4/2020    IMPRESSION: Endotracheal tube and esophagogastric tube have been  removed. Right internal jugular sheath remains with " removal of  Hardy-Trace catheter. Right-sided chest tubes again noted. Prosthetic  heart valve are unchanged. Shallow inspiration in both lungs.  Scattered bibasilar opacities have increased, though may relate to  shallow inspiration. No pneumothorax. Trace, if any, pleural effusion.    Michaela Cullen PA-C  CV Surgery  Pager #[phone]

## 2020-05-05 NOTE — PLAN OF CARE
Discharge Planner PT   Patient plan for discharge: Home  Current status: Pt is a 64 year old female admitted for mitral valve repair, mini thoracotomy. At baseline, pt lives with his wife in a home with multiple steps to enter and within the home. Pt reports independence with mobility and ADLs, no use of AD prior to admit.  This date, pt requires min assist for transfers. Pt tolerates ambulating 50' with FWW and min assist, normal CV response to activity.  Barriers to return to prior living situation: None  Recommendations for discharge: Home with outpatient CR  Rationale for recommendations: Anticipate with further medical management and therapy, patient will be safe to discharge home with wife and outpatient CR.       Entered by: Giulia Barrera 05/05/2020 11:04 AM

## 2020-05-05 NOTE — PROGRESS NOTES
[id number]  Neuro: Lethargic, c/o pain despite PRNs/repositioning, NIX, Up to chair w/ assist of 2  CV: SR/ST with PVCs, V-pacer wires on standby, MAP>65 on/off Moisés, pulses palpable  Pulm: extubated at 1540, RA, LS clear. Chest tube CDI, minimal output  GI: Clear liquid diet, no swallowing issues. ; started on insulin gtt  : bustillo w/ adequate UO  Skin: surgical incisions CDI  Access: RT swan/introducer, LT radial arterial line (positional), PIV x1  Suzy Salinas RN 05/04/20 7:04 PM

## 2020-05-05 NOTE — PLAN OF CARE
A&O x4, VSS here in ICU BPs soft, lungs diminished, -1000, CT no crepitus, no airleak, bowels hypoactive, -flatus, chest incisions intact with dermabond and ecchymotic, oxycodone and robaxin for pain, tele SR.

## 2020-05-05 NOTE — PLAN OF CARE
SR, NC-2L, A&Ox4, chest tube minimal, urine output adequate, a few episodes of nausea throughout the night, Dilaudid 0.5 mg IV, Oxycodone 10mg used for pain control. Moisés gtt off since 04:00, PA cath out, pt's wife updated via phone

## 2020-05-06 ENCOUNTER — APPOINTMENT (OUTPATIENT)
Dept: PHYSICAL THERAPY | Facility: CLINIC | Age: 65
DRG: 219 | End: 2020-05-06
Attending: SURGERY
Payer: COMMERCIAL

## 2020-05-06 LAB
ANION GAP SERPL CALCULATED.3IONS-SCNC: 4 MMOL/L (ref 3–14)
BUN SERPL-MCNC: 22 MG/DL (ref 7–30)
CALCIUM SERPL-MCNC: 8.4 MG/DL (ref 8.5–10.1)
CHLORIDE SERPL-SCNC: 104 MMOL/L (ref 94–109)
CO2 SERPL-SCNC: 26 MMOL/L (ref 20–32)
CREAT SERPL-MCNC: 0.93 MG/DL (ref 0.66–1.25)
ERYTHROCYTE [DISTWIDTH] IN BLOOD BY AUTOMATED COUNT: 13.3 % (ref 10–15)
GFR SERPL CREATININE-BSD FRML MDRD: 84 ML/MIN/{1.73_M2}
GLUCOSE BLDC GLUCOMTR-MCNC: 110 MG/DL (ref 70–99)
GLUCOSE BLDC GLUCOMTR-MCNC: 113 MG/DL (ref 70–99)
GLUCOSE BLDC GLUCOMTR-MCNC: 147 MG/DL (ref 70–99)
GLUCOSE SERPL-MCNC: 92 MG/DL (ref 70–99)
HCT VFR BLD AUTO: 34.9 % (ref 40–53)
HGB BLD-MCNC: 11.5 G/DL (ref 13.3–17.7)
INTERPRETATION ECG - MUSE: NORMAL
INTERPRETATION ECG - MUSE: NORMAL
MCH RBC QN AUTO: 29.9 PG (ref 26.5–33)
MCHC RBC AUTO-ENTMCNC: 33 G/DL (ref 31.5–36.5)
MCV RBC AUTO: 91 FL (ref 78–100)
PHOSPHATE SERPL-MCNC: 1.8 MG/DL (ref 2.5–4.5)
PHOSPHATE SERPL-MCNC: 1.9 MG/DL (ref 2.5–4.5)
PLATELET # BLD AUTO: 164 10E9/L (ref 150–450)
POTASSIUM SERPL-SCNC: 4.9 MMOL/L (ref 3.4–5.3)
RBC # BLD AUTO: 3.84 10E12/L (ref 4.4–5.9)
SODIUM SERPL-SCNC: 134 MMOL/L (ref 133–144)
WBC # BLD AUTO: 23.7 10E9/L (ref 4–11)

## 2020-05-06 PROCEDURE — 12000000 ZZH R&B MED SURG/OB

## 2020-05-06 PROCEDURE — 25000128 H RX IP 250 OP 636: Performed by: PHYSICIAN ASSISTANT

## 2020-05-06 PROCEDURE — 25000132 ZZH RX MED GY IP 250 OP 250 PS 637: Performed by: PHYSICIAN ASSISTANT

## 2020-05-06 PROCEDURE — 36415 COLL VENOUS BLD VENIPUNCTURE: CPT | Performed by: SURGERY

## 2020-05-06 PROCEDURE — 25800030 ZZH RX IP 258 OP 636: Performed by: PHYSICIAN ASSISTANT

## 2020-05-06 PROCEDURE — 36415 COLL VENOUS BLD VENIPUNCTURE: CPT | Performed by: PHYSICIAN ASSISTANT

## 2020-05-06 PROCEDURE — 97110 THERAPEUTIC EXERCISES: CPT | Mod: GP

## 2020-05-06 PROCEDURE — 97530 THERAPEUTIC ACTIVITIES: CPT | Mod: GP

## 2020-05-06 PROCEDURE — 00000146 ZZHCL STATISTIC GLUCOSE BY METER IP

## 2020-05-06 PROCEDURE — 84100 ASSAY OF PHOSPHORUS: CPT | Performed by: PHYSICIAN ASSISTANT

## 2020-05-06 PROCEDURE — 80048 BASIC METABOLIC PNL TOTAL CA: CPT | Performed by: PHYSICIAN ASSISTANT

## 2020-05-06 PROCEDURE — 25000125 ZZHC RX 250: Performed by: PHYSICIAN ASSISTANT

## 2020-05-06 PROCEDURE — 84100 ASSAY OF PHOSPHORUS: CPT | Performed by: SURGERY

## 2020-05-06 PROCEDURE — 85027 COMPLETE CBC AUTOMATED: CPT | Performed by: PHYSICIAN ASSISTANT

## 2020-05-06 RX ORDER — FUROSEMIDE 20 MG
20 TABLET ORAL DAILY
Status: DISCONTINUED | OUTPATIENT
Start: 2020-05-06 | End: 2020-05-09 | Stop reason: HOSPADM

## 2020-05-06 RX ORDER — COLCHICINE 0.6 MG/1
0.6 TABLET ORAL DAILY
Status: DISCONTINUED | OUTPATIENT
Start: 2020-05-06 | End: 2020-05-08

## 2020-05-06 RX ORDER — OXYCODONE HYDROCHLORIDE 5 MG/1
5 TABLET ORAL EVERY 4 HOURS PRN
Status: DISCONTINUED | OUTPATIENT
Start: 2020-05-06 | End: 2020-05-09 | Stop reason: HOSPADM

## 2020-05-06 RX ADMIN — OXYCODONE HYDROCHLORIDE 10 MG: 5 TABLET ORAL at 04:39

## 2020-05-06 RX ADMIN — DIVALPROEX SODIUM 1000 MG: 500 TABLET, EXTENDED RELEASE ORAL at 21:31

## 2020-05-06 RX ADMIN — SENNOSIDES AND DOCUSATE SODIUM 2 TABLET: 8.6; 5 TABLET ORAL at 20:35

## 2020-05-06 RX ADMIN — GABAPENTIN 100 MG: 100 CAPSULE ORAL at 15:18

## 2020-05-06 RX ADMIN — HEPARIN SODIUM 5000 UNITS: 5000 INJECTION, SOLUTION INTRAVENOUS; SUBCUTANEOUS at 15:18

## 2020-05-06 RX ADMIN — HEPARIN SODIUM 5000 UNITS: 5000 INJECTION, SOLUTION INTRAVENOUS; SUBCUTANEOUS at 06:41

## 2020-05-06 RX ADMIN — ACETAMINOPHEN 975 MG: 325 TABLET, FILM COATED ORAL at 15:18

## 2020-05-06 RX ADMIN — POTASSIUM PHOSPHATE, MONOBASIC AND POTASSIUM PHOSPHATE, DIBASIC 20 MMOL: 224; 236 INJECTION, SOLUTION INTRAVENOUS at 21:46

## 2020-05-06 RX ADMIN — GABAPENTIN 100 MG: 100 CAPSULE ORAL at 08:40

## 2020-05-06 RX ADMIN — ASPIRIN 325 MG: 325 TABLET, COATED ORAL at 08:40

## 2020-05-06 RX ADMIN — FUROSEMIDE 20 MG: 20 TABLET ORAL at 10:44

## 2020-05-06 RX ADMIN — OXYCODONE HYDROCHLORIDE 10 MG: 5 TABLET ORAL at 08:39

## 2020-05-06 RX ADMIN — PANTOPRAZOLE SODIUM 40 MG: 40 TABLET, DELAYED RELEASE ORAL at 06:35

## 2020-05-06 RX ADMIN — ACETAMINOPHEN 975 MG: 325 TABLET, FILM COATED ORAL at 08:39

## 2020-05-06 RX ADMIN — COLCHICINE 0.6 MG: 0.6 TABLET, FILM COATED ORAL at 10:58

## 2020-05-06 RX ADMIN — GABAPENTIN 100 MG: 100 CAPSULE ORAL at 21:31

## 2020-05-06 RX ADMIN — METOPROLOL TARTRATE 12.5 MG: 25 TABLET, FILM COATED ORAL at 20:35

## 2020-05-06 RX ADMIN — LIDOCAINE 1 PATCH: 560 PATCH PERCUTANEOUS; TOPICAL; TRANSDERMAL at 08:40

## 2020-05-06 RX ADMIN — POTASSIUM PHOSPHATE, MONOBASIC AND POTASSIUM PHOSPHATE, DIBASIC 20 MMOL: 224; 236 INJECTION, SOLUTION INTRAVENOUS at 10:58

## 2020-05-06 RX ADMIN — SENNOSIDES AND DOCUSATE SODIUM 1 TABLET: 8.6; 5 TABLET ORAL at 08:40

## 2020-05-06 RX ADMIN — POLYETHYLENE GLYCOL 3350 17 G: 17 POWDER, FOR SOLUTION ORAL at 08:40

## 2020-05-06 RX ADMIN — METHOCARBAMOL 500 MG: 500 TABLET, FILM COATED ORAL at 06:35

## 2020-05-06 RX ADMIN — OXYCODONE HYDROCHLORIDE 10 MG: 5 TABLET ORAL at 00:10

## 2020-05-06 RX ADMIN — ACETAMINOPHEN 975 MG: 325 TABLET, FILM COATED ORAL at 00:10

## 2020-05-06 ASSESSMENT — ACTIVITIES OF DAILY LIVING (ADL)
ADLS_ACUITY_SCORE: 16
ADLS_ACUITY_SCORE: 16
ADLS_ACUITY_SCORE: 15
ADLS_ACUITY_SCORE: 16
ADLS_ACUITY_SCORE: 15
ADLS_ACUITY_SCORE: 16

## 2020-05-06 ASSESSMENT — MIFFLIN-ST. JEOR: SCORE: 1850.13

## 2020-05-06 NOTE — PLAN OF CARE
A&Ox4 drowsy, lethargic VS with soft BP, weaned to RA. Up with assist of one GB and FWW. Sternal precautions. Incisions WNL. CT to suction. Pacer wires capped, box at bedside. Blood sugar checks. Cotton removed, DTV. Oxy given for pain but will try to encourage robaxin with pt being so drowsy. Poor appetite.

## 2020-05-06 NOTE — PROGRESS NOTES
[facility]  Cardiovascular and Thoracic Surgery Daily Note          Assessment and Plan:   POD#2 s/p Right mini thoracotomy, mitral valve repair with [place]-Juan Manuel NeoChords to the flail P2 segment, implantation of a 34 mm Leung Physio II annuloplasty ring, right common femoral arterial and venous cannulation for cardiopulmonary bypass, intraoperative PHILLIP by Dr. Evelin Coronel  -CVS: HR: 80s. SBP: 90s-110s. Pre op EF: 60-65%. ASA, BB held this am d/t soft BP. Will decrease BB to 12.5mg BID with hold parameters. No statin as no CAD. Pacer wires capped. Chest tube output too much to pull. Will add colchicine daily for pericarditis prophylaxis. Will add daily 20mg po lasix today.   -Resp: Extubated within protocol. Saturating well on 2-3L. Wean oxygen as able. Continue to encourage IS, cough, deep breathing, ambulation.  -Neuro: Grossly intact. Pain controlled. Pt somnolent on exam, rouses appropriately but nods off quickly during conversation- encouraged increased muscle relaxer use to assist with pain control while also reducing narcotic use.   -Renal: good uop. Close to preoperative weight. Cr: 0.93. Adding 20mg PO lasix today. Will continue to monitor response  -GI: Tolerating clear diet, discussed advancing diet. No BM. +flatus. Continue bowel regimen.   -:  Remove bustillo today  -Endo: pre op A1c: 5.5. Continue sliding scale insulin.   -FEN: replace electrolytes as needed. Na: 134. K: 4.9  Orders Placed This Encounter      Advance Diet as Tolerated: Full Liquid Diet; Regular Diet Adult    -ID: Temp (24hrs), Av  F (37.2  C), Min:97.4  F (36.3  C), Max:99.9  F (37.7  C)  WBC: 23.7<28.5. Completed perioperative abx.   -Heme: Hgb: 11.5. plt: 164. Acute blood loss anemia and thrombocytopenia related to surgery.   -Proph: PCD, ASA, BB, PPI, sub q heparin  -Dispo: [address]. Continue therapies. Continue to encourage IS, cough, deep breathing, ambulation.           Interval History:   No acute events  "overnight. Saturating well on 2-3L. Pain controlled, but a bit somnolent on exam. Rouses to voice and answers questions appropriately but nods off quickly during conversation. Tolerating clears and fulls. +flatus, no BM.          Medications:       acetaminophen  975 mg Oral Q8H     aspirin  325 mg Oral Daily     colchicine  0.6 mg Oral Daily     divalproex sodium extended-release  1,000 mg Oral At Bedtime     furosemide  20 mg Oral Daily     gabapentin  100 mg Oral TID     heparin ANTICOAGULANT  5,000 Units Subcutaneous Q8H     insulin aspart  1-7 Units Subcutaneous TID AC     insulin aspart  1-5 Units Subcutaneous At Bedtime     lidocaine  1-2 patch Transdermal Q24H     lidocaine   Transdermal Q8H     metoprolol tartrate  12.5 mg Oral Q12H     pantoprazole  40 mg Oral QAM AC     polyethylene glycol  17 g Oral Daily     senna-docusate  1 tablet Oral BID    Or     senna-docusate  2 tablet Oral BID     sodium chloride (PF)  3 mL Intracatheter Q8H     [START ON 5/7/2020] acetaminophen, dextrose, glucose **OR** dextrose **OR** glucagon, hydrALAZINE, HYDROmorphone, ketorolac, lidocaine 4%, lidocaine (buffered or not buffered), magnesium sulfate, magnesium sulfate, melatonin, methocarbamol, naloxone, ondansetron **OR** ondansetron, oxyCODONE IR, potassium chloride, potassium chloride with lidocaine, potassium chloride, potassium chloride, potassium chloride, potassium phosphate (KPHOS) in D5W IV, potassium phosphate (KPHOS) in D5W IV, potassium phosphate (KPHOS) in D5W IV, potassium phosphate (KPHOS) in D5W IV, potassium phosphate (KPHOS) in D5W IV, prochlorperazine **OR** prochlorperazine, sodium chloride (PF)          Physical Exam:   Vitals were reviewed  Blood pressure 106/68, pulse 84, temperature 98.4  F (36.9  C), temperature source Oral, resp. rate 17, height 1.803 m (5' 11\"), weight 104.8 kg (231 lb 0.7 oz), SpO2 97 %.  Rhythm: NSR    Lungs: diminished bases    Cardiovascular: RRR normal s1 and s2    Abdomen: " soft NTND    Extremeties: minimal edema    Incision: YURI    CT: to suction    Weight:   Vitals:    05/04/20 0559 05/05/20 0400 05/05/20 1800 05/06/20 0500   Weight: 104.3 kg (230 lb) 101.9 kg (224 lb 10.4 oz) 105.5 kg (232 lb 9.4 oz) 104.8 kg (231 lb 0.7 oz)            Data:   Labs:   Lab Results   Component Value Date    WBC 23.7 05/06/2020     Lab Results   Component Value Date    RBC 3.84 05/06/2020     Lab Results   Component Value Date    HGB 11.5 05/06/2020     Lab Results   Component Value Date    HCT 34.9 05/06/2020     No components found for: MCT  Lab Results   Component Value Date    MCV 91 05/06/2020     Lab Results   Component Value Date    MCH 29.9 05/06/2020     Lab Results   Component Value Date    MCHC 33.0 05/06/2020     Lab Results   Component Value Date    RDW 13.3 05/06/2020     Lab Results   Component Value Date     05/06/2020       Last Basic Metabolic Panel:  Lab Results   Component Value Date     05/06/2020      Lab Results   Component Value Date    POTASSIUM 4.9 05/06/2020     Lab Results   Component Value Date    CHLORIDE 104 05/06/2020     Lab Results   Component Value Date    RUEL 8.4 05/06/2020     Lab Results   Component Value Date    CO2 26 05/06/2020     Lab Results   Component Value Date    BUN 22 05/06/2020     Lab Results   Component Value Date    CR 0.93 05/06/2020     Lab Results   Component Value Date    GLC 92 05/06/2020         Michaela Cullen PA-C  CV Surgery  Pager # [phone]

## 2020-05-06 NOTE — PROGRESS NOTES
"NUTRITION ASSESSMENT      REASON FOR ASSESSMENT:  Cardiac Surgery Nutrition Consult    CURRENT DIET / INTAKE:  Diet: full liquids    Chart reviewed  Spoke with pt via the phone this morning  Notes that he ate breakfast - orange ice, juice  \"I am still on liquids and would like to try some solid foods\"  Is agreeable to trying a high protein jello BID between meals      ANTHROPOMETRICS:   Ht: 5'11\"  Wt: (5/4 - admit) 104.3 kg  BMI: 32  IBW: 78.2 kg  Weight Status: Obesity Grade I BMI 30-34.9  %IBW: 133%    MALNUTRITION:  Patient does not meet two of the following criteria necessary for diagnosing malnutrition:  significant weight loss, reduced intake, subcutaneous fat loss, muscle loss or fluid retention. Nutrition Focused Physical Assessment (NFPA) not appropriate at this time.    NUTRITION DIAGNOSIS:   Increased nutrient needs (protein) R/t higher demand for healing AEB pt is s/p MVR surgery 5/4/20    INTERVENTIONS:    Nutrition Prescription:  Full liquids (advance per MD)    Implementation:  Nutrition Education (Content):  Discussed the importance of adequate nutrition post-op for healing and energy, emphasizing protein foods, and encouraged patient to order a protein food at each meal.  Will send a cherry Gelatein PLUS at 10am and 2pm (150 cals, 20 gm pro each)    Goals:  Patient to consume ~75% at meals     Follow Up/Monitoring (InPatient):  Food and Fluid intake -  Monitor for adequacy    Follow Up/Monitoring (OutPatient):  Patient will participate in out-patient cardiac rehab and attend nutrition classes during the program    "

## 2020-05-06 NOTE — PLAN OF CARE
Discharge Planner PT   Patient plan for discharge: Home  Current status: Pt requires min assist for bed mobility and transfers. Pt tolerates ambulating 100' with FWW and CGA, normal CV response to activity.  Barriers to return to prior living situation: None  Recommendations for discharge: Home with outpatient CR  Rationale for recommendations: Anticipate with further medical management and therapy, patient will be safe to discharge home with wife and outpatient CR.       Entered by: Giulia Barrera 05/06/2020 9:05 AM

## 2020-05-06 NOTE — PLAN OF CARE
A&O. VSS on 2-3L NC. Tele: Sinus Rhythm. Lung sounds diminished, somewhat coarse on the right. . CT to suction, no airleak. Incisions approximated with some bruising. Oxycodone and robaxin given for pain with dilaudid given once for breakthrough.

## 2020-05-07 ENCOUNTER — APPOINTMENT (OUTPATIENT)
Dept: GENERAL RADIOLOGY | Facility: CLINIC | Age: 65
DRG: 219 | End: 2020-05-07
Attending: PHYSICIAN ASSISTANT
Payer: COMMERCIAL

## 2020-05-07 ENCOUNTER — APPOINTMENT (OUTPATIENT)
Dept: PHYSICAL THERAPY | Facility: CLINIC | Age: 65
DRG: 219 | End: 2020-05-07
Attending: SURGERY
Payer: COMMERCIAL

## 2020-05-07 LAB
ANION GAP SERPL CALCULATED.3IONS-SCNC: 7 MMOL/L (ref 3–14)
BUN SERPL-MCNC: 15 MG/DL (ref 7–30)
CALCIUM SERPL-MCNC: 8.9 MG/DL (ref 8.5–10.1)
CHLORIDE SERPL-SCNC: 105 MMOL/L (ref 94–109)
CO2 SERPL-SCNC: 25 MMOL/L (ref 20–32)
CREAT SERPL-MCNC: 0.72 MG/DL (ref 0.66–1.25)
ERYTHROCYTE [DISTWIDTH] IN BLOOD BY AUTOMATED COUNT: 13.2 % (ref 10–15)
GFR SERPL CREATININE-BSD FRML MDRD: >90 ML/MIN/{1.73_M2}
GLUCOSE BLDC GLUCOMTR-MCNC: 79 MG/DL (ref 70–99)
GLUCOSE BLDC GLUCOMTR-MCNC: 80 MG/DL (ref 70–99)
GLUCOSE BLDC GLUCOMTR-MCNC: 83 MG/DL (ref 70–99)
GLUCOSE BLDC GLUCOMTR-MCNC: 96 MG/DL (ref 70–99)
GLUCOSE SERPL-MCNC: 101 MG/DL (ref 70–99)
HCT VFR BLD AUTO: 37.4 % (ref 40–53)
HGB BLD-MCNC: 12.4 G/DL (ref 13.3–17.7)
LACTATE BLD-SCNC: 2.9 MMOL/L (ref 0.7–2)
MCH RBC QN AUTO: 29.3 PG (ref 26.5–33)
MCHC RBC AUTO-ENTMCNC: 33.2 G/DL (ref 31.5–36.5)
MCV RBC AUTO: 88 FL (ref 78–100)
PHOSPHATE SERPL-MCNC: 1.6 MG/DL (ref 2.5–4.5)
PHOSPHATE SERPL-MCNC: 2.1 MG/DL (ref 2.5–4.5)
PLATELET # BLD AUTO: 196 10E9/L (ref 150–450)
POTASSIUM SERPL-SCNC: 4 MMOL/L (ref 3.4–5.3)
RBC # BLD AUTO: 4.23 10E12/L (ref 4.4–5.9)
SODIUM SERPL-SCNC: 137 MMOL/L (ref 133–144)
WBC # BLD AUTO: 20.4 10E9/L (ref 4–11)

## 2020-05-07 PROCEDURE — 85027 COMPLETE CBC AUTOMATED: CPT | Performed by: PHYSICIAN ASSISTANT

## 2020-05-07 PROCEDURE — 71046 X-RAY EXAM CHEST 2 VIEWS: CPT

## 2020-05-07 PROCEDURE — 36415 COLL VENOUS BLD VENIPUNCTURE: CPT | Performed by: SURGERY

## 2020-05-07 PROCEDURE — 25000125 ZZHC RX 250: Performed by: PHYSICIAN ASSISTANT

## 2020-05-07 PROCEDURE — 93010 ELECTROCARDIOGRAM REPORT: CPT | Performed by: INTERNAL MEDICINE

## 2020-05-07 PROCEDURE — 00000146 ZZHCL STATISTIC GLUCOSE BY METER IP

## 2020-05-07 PROCEDURE — 25800030 ZZH RX IP 258 OP 636: Performed by: PHYSICIAN ASSISTANT

## 2020-05-07 PROCEDURE — 83605 ASSAY OF LACTIC ACID: CPT | Performed by: SURGERY

## 2020-05-07 PROCEDURE — 25000132 ZZH RX MED GY IP 250 OP 250 PS 637: Performed by: PHYSICIAN ASSISTANT

## 2020-05-07 PROCEDURE — 97110 THERAPEUTIC EXERCISES: CPT | Mod: GP | Performed by: PHYSICAL THERAPIST

## 2020-05-07 PROCEDURE — 25000128 H RX IP 250 OP 636: Performed by: PHYSICIAN ASSISTANT

## 2020-05-07 PROCEDURE — 80048 BASIC METABOLIC PNL TOTAL CA: CPT | Performed by: PHYSICIAN ASSISTANT

## 2020-05-07 PROCEDURE — 12000000 ZZH R&B MED SURG/OB

## 2020-05-07 PROCEDURE — 25800030 ZZH RX IP 258 OP 636: Performed by: SURGERY

## 2020-05-07 PROCEDURE — 25000128 H RX IP 250 OP 636: Performed by: SURGERY

## 2020-05-07 PROCEDURE — 84100 ASSAY OF PHOSPHORUS: CPT | Performed by: PHYSICIAN ASSISTANT

## 2020-05-07 PROCEDURE — 97530 THERAPEUTIC ACTIVITIES: CPT | Mod: GP | Performed by: PHYSICAL THERAPIST

## 2020-05-07 PROCEDURE — 93005 ELECTROCARDIOGRAM TRACING: CPT

## 2020-05-07 PROCEDURE — 36415 COLL VENOUS BLD VENIPUNCTURE: CPT | Performed by: PHYSICIAN ASSISTANT

## 2020-05-07 PROCEDURE — 84100 ASSAY OF PHOSPHORUS: CPT | Performed by: SURGERY

## 2020-05-07 RX ORDER — METOPROLOL TARTRATE 25 MG/1
25 TABLET, FILM COATED ORAL EVERY 12 HOURS
Status: DISCONTINUED | OUTPATIENT
Start: 2020-05-07 | End: 2020-05-09 | Stop reason: HOSPADM

## 2020-05-07 RX ADMIN — ACETAMINOPHEN 975 MG: 325 TABLET, FILM COATED ORAL at 07:28

## 2020-05-07 RX ADMIN — ACETAMINOPHEN 975 MG: 325 TABLET, FILM COATED ORAL at 00:30

## 2020-05-07 RX ADMIN — METHOCARBAMOL 500 MG: 500 TABLET, FILM COATED ORAL at 22:31

## 2020-05-07 RX ADMIN — POTASSIUM CHLORIDE 20 MEQ: 1500 TABLET, EXTENDED RELEASE ORAL at 10:32

## 2020-05-07 RX ADMIN — METOPROLOL TARTRATE 12.5 MG: 25 TABLET, FILM COATED ORAL at 15:32

## 2020-05-07 RX ADMIN — OXYCODONE HYDROCHLORIDE 5 MG: 5 TABLET ORAL at 20:24

## 2020-05-07 RX ADMIN — METOPROLOL TARTRATE 12.5 MG: 25 TABLET, FILM COATED ORAL at 07:28

## 2020-05-07 RX ADMIN — METHOCARBAMOL 500 MG: 500 TABLET, FILM COATED ORAL at 07:28

## 2020-05-07 RX ADMIN — HEPARIN SODIUM 5000 UNITS: 5000 INJECTION, SOLUTION INTRAVENOUS; SUBCUTANEOUS at 06:53

## 2020-05-07 RX ADMIN — AMIODARONE HYDROCHLORIDE 150 MG: 1.5 INJECTION, SOLUTION INTRAVENOUS at 17:57

## 2020-05-07 RX ADMIN — HEPARIN SODIUM 5000 UNITS: 5000 INJECTION, SOLUTION INTRAVENOUS; SUBCUTANEOUS at 14:55

## 2020-05-07 RX ADMIN — FUROSEMIDE 20 MG: 20 TABLET ORAL at 09:13

## 2020-05-07 RX ADMIN — LIDOCAINE 1 PATCH: 560 PATCH PERCUTANEOUS; TOPICAL; TRANSDERMAL at 07:28

## 2020-05-07 RX ADMIN — PANTOPRAZOLE SODIUM 40 MG: 40 TABLET, DELAYED RELEASE ORAL at 06:53

## 2020-05-07 RX ADMIN — OXYCODONE HYDROCHLORIDE 5 MG: 5 TABLET ORAL at 07:28

## 2020-05-07 RX ADMIN — HEPARIN SODIUM 5000 UNITS: 5000 INJECTION, SOLUTION INTRAVENOUS; SUBCUTANEOUS at 00:30

## 2020-05-07 RX ADMIN — COLCHICINE 0.6 MG: 0.6 TABLET, FILM COATED ORAL at 09:13

## 2020-05-07 RX ADMIN — AMIODARONE HYDROCHLORIDE 1 MG/MIN: 50 INJECTION, SOLUTION INTRAVENOUS at 18:34

## 2020-05-07 RX ADMIN — ASPIRIN 325 MG: 325 TABLET, COATED ORAL at 09:13

## 2020-05-07 RX ADMIN — DIVALPROEX SODIUM 1000 MG: 500 TABLET, EXTENDED RELEASE ORAL at 22:31

## 2020-05-07 RX ADMIN — GABAPENTIN 100 MG: 100 CAPSULE ORAL at 09:13

## 2020-05-07 RX ADMIN — HEPARIN SODIUM 5000 UNITS: 5000 INJECTION, SOLUTION INTRAVENOUS; SUBCUTANEOUS at 22:31

## 2020-05-07 RX ADMIN — ACETAMINOPHEN 650 MG: 325 TABLET, FILM COATED ORAL at 20:24

## 2020-05-07 RX ADMIN — POTASSIUM PHOSPHATE, MONOBASIC AND POTASSIUM PHOSPHATE, DIBASIC 20 MMOL: 224; 236 INJECTION, SOLUTION INTRAVENOUS at 11:26

## 2020-05-07 RX ADMIN — CALCIUM GLUCONATE 1 G: 98 INJECTION, SOLUTION INTRAVENOUS at 20:14

## 2020-05-07 ASSESSMENT — ACTIVITIES OF DAILY LIVING (ADL)
ADLS_ACUITY_SCORE: 16
ADLS_ACUITY_SCORE: 14
ADLS_ACUITY_SCORE: 16

## 2020-05-07 ASSESSMENT — MIFFLIN-ST. JEOR: SCORE: 1844.13

## 2020-05-07 NOTE — PLAN OF CARE
Discharge Planner PT   Patient plan for discharge: Home  Current status: Pt requires CGA for sit<>stand transfers, Donald for sit>supine. Pt ambulated 150' with FWW and SBA with stable CV response - see VSFS. Educated on symptoms of activity intolerance, OMNI scale, aerobic activity recommendations.  Barriers to return to prior living situation: None anticipated  Recommendations for discharge: Home with Phase II OP CR  Rationale for recommendations: Anticipate with further medical management and therapy, patient will be safe to discharge home with wife. Pt will benefit from Phase II OP CR to safely progress functional activity tolerance with monitored exercise and continue cardiac health education.       Entered by: Sonia Gardner 05/07/2020 9:03 AM

## 2020-05-07 NOTE — PROVIDER NOTIFICATION
Hr 120-140's irregular, patient asymptomatic,/63  EKG reads ST with 1st degree AV Block & 2nd EKG reads A-Fib RVR.  Michaela BROOKE notified. New orders to be placed.

## 2020-05-07 NOTE — PLAN OF CARE
A&O x4, flat affect. VSS on room air. Tele NSR, had 3 runs SVT/afib, Michaela w/CV surgery aware, CTM. CMS intact, mild edema in arms and ankles. Lungs diminished. CTs and pacer wires pulled at 1000. BS+, BM-, flatus+. Incisions JEFFERSON w/liquid bandage, ecchymotic. Tolerating regular diet. Denies N/V. BGs 101 and 79. Voiding adequately. Oxycodone and Robaxin for pain. Up w/1, GB and walker. K and phos replaced.

## 2020-05-07 NOTE — PROGRESS NOTES
[facility]  Cardiovascular and Thoracic Surgery Daily Note          Assessment and Plan:   POD#3 s/p Right mini thoracotomy, mitral valve repair with [place]-Juan Manuel NeoChords to the flail P2 segment, implantation of a 34 mm Leung Physio II annuloplasty ring, right common femoral arterial and venous cannulation for cardiopulmonary bypass, intraoperative PHILLIP by Dr. Evelin Coronel  -CVS: HR: 80s-90s. SBP: 110s-130s. Pre op EF: 60-65%. ASA, BB. No statin as no CAD. Colchicine added daily for pericarditis prophylaxis. Continue lasix daily. Chest tube output decreased appropriately, chest tubes and pacer wires removed this am. Will repeat CXR tomorrow.   -Resp: Extubated within protocol. Saturating well on room air. Continue to encourage IS, cough, deep breathing, ambulation.   -Neuro:  Grossly intact. Pain controlled. Pt much more alert on exam today. Will continue to minimize narcotics as able by using alternative pain regimen.   -Renal: good uop. Close to preoperative weight. Cr: 0.72. Will continue PO lasix today and continue to monitor response  -GI:  Tolerating diet. +flatus. No BM. Continue bowel regimen  -:  Voiding well on own  -Endo: pre op a1c: 5.5. Continue sliding scale insulin  -FEN: replace electrolytes as needed. Na: 137. K: 4.0.   Orders Placed This Encounter      Advance Diet as Tolerated: Regular Diet Adult; Regular Diet Adult    -ID: Temp (24hrs), Av.4  F (36.9  C), Min:97.6  F (36.4  C), Max:99.2  F (37.3  C)  WBC: 20.4<23.7<28.5. completed perioperative abx. Leukocytosis likely related to stress from surgery  -Heme: Hgb: 12.4. plt: 196. Acute blood loss anemia and thrombocytopenia related to surgery  -Proph: PCD, ASA, BB, PPI, sub q heparin  -Dispo: [facility] [address]. Continue therapies. Continue to encourage IS, cough, deep breathing, ambulation.           Interval History:   No acute events overnight. Saturating well on room air. Pain controlled. Tolerating diet. No BM.           "Medications:       aspirin  325 mg Oral Daily     colchicine  0.6 mg Oral Daily     divalproex sodium extended-release  1,000 mg Oral At Bedtime     furosemide  20 mg Oral Daily     heparin ANTICOAGULANT  5,000 Units Subcutaneous Q8H     insulin aspart  1-7 Units Subcutaneous TID AC     insulin aspart  1-5 Units Subcutaneous At Bedtime     lidocaine  1-2 patch Transdermal Q24H     lidocaine   Transdermal Q8H     metoprolol tartrate  12.5 mg Oral Q12H     pantoprazole  40 mg Oral QAM AC     polyethylene glycol  17 g Oral Daily     senna-docusate  1 tablet Oral BID    Or     senna-docusate  2 tablet Oral BID     sodium chloride (PF)  3 mL Intracatheter Q8H     acetaminophen, dextrose, glucose **OR** dextrose **OR** glucagon, hydrALAZINE, HYDROmorphone, ketorolac, lidocaine 4%, lidocaine (buffered or not buffered), magnesium sulfate, magnesium sulfate, melatonin, methocarbamol, naloxone, ondansetron **OR** ondansetron, oxyCODONE IR, potassium chloride, potassium chloride with lidocaine, potassium chloride, potassium chloride, potassium chloride, potassium phosphate (KPHOS) in D5W IV, potassium phosphate (KPHOS) in D5W IV, potassium phosphate (KPHOS) in D5W IV, potassium phosphate (KPHOS) in D5W IV, potassium phosphate (KPHOS) in D5W IV, prochlorperazine **OR** prochlorperazine, sodium chloride (PF)          Physical Exam:   Vitals were reviewed  Blood pressure 109/67, pulse 99, temperature 98.6  F (37  C), temperature source Oral, resp. rate 16, height 1.803 m (5' 11\"), weight 104.2 kg (229 lb 11.5 oz), SpO2 96 %.  Rhythm: NSR    Lungs: diminished bases    Cardiovascular: RRR normal s1 and s2    Abdomen: soft NTND    Extremeties: minimal edema    Incision: CDI    CT: n/a    Weight:   Vitals:    05/04/20 0559 05/05/20 0400 05/05/20 1800 05/06/20 0500   Weight: 104.3 kg (230 lb) 101.9 kg (224 lb 10.4 oz) 105.5 kg (232 lb 9.4 oz) 104.8 kg (231 lb 0.7 oz)    05/07/20 0700   Weight: 104.2 kg (229 lb 11.5 oz)            " Data:   Labs:   Lab Results   Component Value Date    WBC 20.4 05/07/2020     Lab Results   Component Value Date    RBC 4.23 05/07/2020     Lab Results   Component Value Date    HGB 12.4 05/07/2020     Lab Results   Component Value Date    HCT 37.4 05/07/2020     No components found for: MCT  Lab Results   Component Value Date    MCV 88 05/07/2020     Lab Results   Component Value Date    MCH 29.3 05/07/2020     Lab Results   Component Value Date    MCHC 33.2 05/07/2020     Lab Results   Component Value Date    RDW 13.2 05/07/2020     Lab Results   Component Value Date     05/07/2020       Last Basic Metabolic Panel:  Lab Results   Component Value Date     05/07/2020      Lab Results   Component Value Date    POTASSIUM 4.0 05/07/2020     Lab Results   Component Value Date    CHLORIDE 105 05/07/2020     Lab Results   Component Value Date    RUEL 8.9 05/07/2020     Lab Results   Component Value Date    CO2 25 05/07/2020     Lab Results   Component Value Date    BUN 15 05/07/2020     Lab Results   Component Value Date    CR 0.72 05/07/2020     Lab Results   Component Value Date     05/07/2020         Michaela Cullen PA-C  CV Surgery  Pager # [phone]

## 2020-05-07 NOTE — PLAN OF CARE
Patient is alert and orientated X 4 up with assistance of one with walker. Vital signs stable, denies pain. Sternal precautions encouraged. Incisions WNL. CT to suction no crepitus, no airleak total output 80 ML , pacer wires capped. Tele: ST. Bowels hypoactive, passing flatus, no BM. Voiding adequate.

## 2020-05-08 ENCOUNTER — APPOINTMENT (OUTPATIENT)
Dept: PHYSICAL THERAPY | Facility: CLINIC | Age: 65
DRG: 219 | End: 2020-05-08
Attending: SURGERY
Payer: COMMERCIAL

## 2020-05-08 LAB
ALBUMIN UR-MCNC: 10 MG/DL
ANION GAP SERPL CALCULATED.3IONS-SCNC: 8 MMOL/L (ref 3–14)
APPEARANCE UR: CLEAR
BILIRUB UR QL STRIP: NEGATIVE
BUN SERPL-MCNC: 11 MG/DL (ref 7–30)
CALCIUM SERPL-MCNC: 8.7 MG/DL (ref 8.5–10.1)
CHLORIDE SERPL-SCNC: 101 MMOL/L (ref 94–109)
CO2 SERPL-SCNC: 25 MMOL/L (ref 20–32)
COLOR UR AUTO: YELLOW
CREAT SERPL-MCNC: 0.7 MG/DL (ref 0.66–1.25)
ERYTHROCYTE [DISTWIDTH] IN BLOOD BY AUTOMATED COUNT: 13 % (ref 10–15)
GFR SERPL CREATININE-BSD FRML MDRD: >90 ML/MIN/{1.73_M2}
GLUCOSE BLDC GLUCOMTR-MCNC: 102 MG/DL (ref 70–99)
GLUCOSE BLDC GLUCOMTR-MCNC: 105 MG/DL (ref 70–99)
GLUCOSE BLDC GLUCOMTR-MCNC: 79 MG/DL (ref 70–99)
GLUCOSE BLDC GLUCOMTR-MCNC: 93 MG/DL (ref 70–99)
GLUCOSE SERPL-MCNC: 113 MG/DL (ref 70–99)
GLUCOSE UR STRIP-MCNC: NEGATIVE MG/DL
HCT VFR BLD AUTO: 37.3 % (ref 40–53)
HGB BLD-MCNC: 12.6 G/DL (ref 13.3–17.7)
HGB UR QL STRIP: NEGATIVE
KETONES UR STRIP-MCNC: NEGATIVE MG/DL
LACTATE BLD-SCNC: 1.7 MMOL/L (ref 0.7–2)
LEUKOCYTE ESTERASE UR QL STRIP: NEGATIVE
MCH RBC QN AUTO: 30.1 PG (ref 26.5–33)
MCHC RBC AUTO-ENTMCNC: 33.8 G/DL (ref 31.5–36.5)
MCV RBC AUTO: 89 FL (ref 78–100)
NITRATE UR QL: NEGATIVE
PH UR STRIP: 6.5 PH (ref 5–7)
PHOSPHATE SERPL-MCNC: 2.5 MG/DL (ref 2.5–4.5)
PLATELET # BLD AUTO: 259 10E9/L (ref 150–450)
POTASSIUM SERPL-SCNC: 4 MMOL/L (ref 3.4–5.3)
RBC # BLD AUTO: 4.18 10E12/L (ref 4.4–5.9)
RBC #/AREA URNS AUTO: 1 /HPF (ref 0–2)
SODIUM SERPL-SCNC: 134 MMOL/L (ref 133–144)
SOURCE: ABNORMAL
SP GR UR STRIP: 1.02 (ref 1–1.03)
SQUAMOUS #/AREA URNS AUTO: <1 /HPF (ref 0–1)
UROBILINOGEN UR STRIP-MCNC: 2 MG/DL (ref 0–2)
WBC # BLD AUTO: 18.2 10E9/L (ref 4–11)
WBC #/AREA URNS AUTO: 1 /HPF (ref 0–5)

## 2020-05-08 PROCEDURE — 25800030 ZZH RX IP 258 OP 636: Performed by: PHYSICIAN ASSISTANT

## 2020-05-08 PROCEDURE — 84100 ASSAY OF PHOSPHORUS: CPT | Performed by: PHYSICIAN ASSISTANT

## 2020-05-08 PROCEDURE — 25000132 ZZH RX MED GY IP 250 OP 250 PS 637: Performed by: PHYSICIAN ASSISTANT

## 2020-05-08 PROCEDURE — 97110 THERAPEUTIC EXERCISES: CPT | Mod: GP | Performed by: PHYSICAL THERAPIST

## 2020-05-08 PROCEDURE — 97530 THERAPEUTIC ACTIVITIES: CPT | Mod: GP | Performed by: PHYSICAL THERAPIST

## 2020-05-08 PROCEDURE — 81001 URINALYSIS AUTO W/SCOPE: CPT | Performed by: SURGERY

## 2020-05-08 PROCEDURE — 36415 COLL VENOUS BLD VENIPUNCTURE: CPT | Performed by: SURGERY

## 2020-05-08 PROCEDURE — 12000000 ZZH R&B MED SURG/OB

## 2020-05-08 PROCEDURE — 25000128 H RX IP 250 OP 636: Performed by: PHYSICIAN ASSISTANT

## 2020-05-08 PROCEDURE — 80048 BASIC METABOLIC PNL TOTAL CA: CPT | Performed by: PHYSICIAN ASSISTANT

## 2020-05-08 PROCEDURE — 85027 COMPLETE CBC AUTOMATED: CPT | Performed by: PHYSICIAN ASSISTANT

## 2020-05-08 PROCEDURE — 36415 COLL VENOUS BLD VENIPUNCTURE: CPT | Performed by: PHYSICIAN ASSISTANT

## 2020-05-08 PROCEDURE — 00000146 ZZHCL STATISTIC GLUCOSE BY METER IP

## 2020-05-08 PROCEDURE — 25000125 ZZHC RX 250: Performed by: PHYSICIAN ASSISTANT

## 2020-05-08 PROCEDURE — 83605 ASSAY OF LACTIC ACID: CPT | Performed by: SURGERY

## 2020-05-08 RX ORDER — MAGNESIUM CARB/ALUMINUM HYDROX 105-160MG
148 TABLET,CHEWABLE ORAL ONCE
Status: COMPLETED | OUTPATIENT
Start: 2020-05-08 | End: 2020-05-08

## 2020-05-08 RX ORDER — AMIODARONE HYDROCHLORIDE 200 MG/1
400 TABLET ORAL 2 TIMES DAILY
Status: DISCONTINUED | OUTPATIENT
Start: 2020-05-08 | End: 2020-05-09 | Stop reason: HOSPADM

## 2020-05-08 RX ADMIN — POTASSIUM CHLORIDE 20 MEQ: 1500 TABLET, EXTENDED RELEASE ORAL at 11:13

## 2020-05-08 RX ADMIN — METOPROLOL TARTRATE 25 MG: 25 TABLET, FILM COATED ORAL at 20:10

## 2020-05-08 RX ADMIN — ASPIRIN 325 MG: 325 TABLET, COATED ORAL at 08:10

## 2020-05-08 RX ADMIN — ACETAMINOPHEN 650 MG: 325 TABLET, FILM COATED ORAL at 03:45

## 2020-05-08 RX ADMIN — ACETAMINOPHEN 650 MG: 325 TABLET, FILM COATED ORAL at 08:19

## 2020-05-08 RX ADMIN — OXYCODONE HYDROCHLORIDE 5 MG: 5 TABLET ORAL at 22:56

## 2020-05-08 RX ADMIN — HEPARIN SODIUM 5000 UNITS: 5000 INJECTION, SOLUTION INTRAVENOUS; SUBCUTANEOUS at 22:57

## 2020-05-08 RX ADMIN — LIDOCAINE 1 PATCH: 560 PATCH PERCUTANEOUS; TOPICAL; TRANSDERMAL at 08:10

## 2020-05-08 RX ADMIN — OXYCODONE HYDROCHLORIDE 5 MG: 5 TABLET ORAL at 03:47

## 2020-05-08 RX ADMIN — OXYCODONE HYDROCHLORIDE 5 MG: 5 TABLET ORAL at 18:59

## 2020-05-08 RX ADMIN — DIVALPROEX SODIUM 1000 MG: 500 TABLET, EXTENDED RELEASE ORAL at 21:53

## 2020-05-08 RX ADMIN — METOPROLOL TARTRATE 25 MG: 25 TABLET, FILM COATED ORAL at 10:06

## 2020-05-08 RX ADMIN — SENNOSIDES AND DOCUSATE SODIUM 2 TABLET: 8.6; 5 TABLET ORAL at 08:08

## 2020-05-08 RX ADMIN — AMIODARONE HYDROCHLORIDE 400 MG: 200 TABLET ORAL at 21:53

## 2020-05-08 RX ADMIN — METHOCARBAMOL 500 MG: 500 TABLET, FILM COATED ORAL at 20:09

## 2020-05-08 RX ADMIN — AMIODARONE HYDROCHLORIDE 0.5 MG/MIN: 50 INJECTION, SOLUTION INTRAVENOUS at 07:18

## 2020-05-08 RX ADMIN — AMIODARONE HYDROCHLORIDE 1 MG/MIN: 50 INJECTION, SOLUTION INTRAVENOUS at 00:25

## 2020-05-08 RX ADMIN — POTASSIUM PHOSPHATE, MONOBASIC AND POTASSIUM PHOSPHATE, DIBASIC 15 MMOL: 224; 236 INJECTION, SOLUTION INTRAVENOUS at 00:07

## 2020-05-08 RX ADMIN — AMIODARONE HYDROCHLORIDE 400 MG: 200 TABLET ORAL at 11:13

## 2020-05-08 RX ADMIN — MAGNESIUM CITRATE 148 ML: 1.75 LIQUID ORAL at 11:20

## 2020-05-08 RX ADMIN — METHOCARBAMOL 500 MG: 500 TABLET, FILM COATED ORAL at 11:19

## 2020-05-08 RX ADMIN — COLCHICINE 0.6 MG: 0.6 TABLET, FILM COATED ORAL at 08:08

## 2020-05-08 RX ADMIN — POLYETHYLENE GLYCOL 3350 17 G: 17 POWDER, FOR SOLUTION ORAL at 08:10

## 2020-05-08 RX ADMIN — SENNOSIDES AND DOCUSATE SODIUM 2 TABLET: 8.6; 5 TABLET ORAL at 20:09

## 2020-05-08 RX ADMIN — OXYCODONE HYDROCHLORIDE 5 MG: 5 TABLET ORAL at 13:08

## 2020-05-08 RX ADMIN — HEPARIN SODIUM 5000 UNITS: 5000 INJECTION, SOLUTION INTRAVENOUS; SUBCUTANEOUS at 06:58

## 2020-05-08 RX ADMIN — HEPARIN SODIUM 5000 UNITS: 5000 INJECTION, SOLUTION INTRAVENOUS; SUBCUTANEOUS at 15:52

## 2020-05-08 RX ADMIN — OXYCODONE HYDROCHLORIDE 5 MG: 5 TABLET ORAL at 08:19

## 2020-05-08 RX ADMIN — FUROSEMIDE 20 MG: 20 TABLET ORAL at 08:08

## 2020-05-08 RX ADMIN — PANTOPRAZOLE SODIUM 40 MG: 40 TABLET, DELAYED RELEASE ORAL at 06:58

## 2020-05-08 ASSESSMENT — ACTIVITIES OF DAILY LIVING (ADL)
ADLS_ACUITY_SCORE: 15
ADLS_ACUITY_SCORE: 16
ADLS_ACUITY_SCORE: 16
ADLS_ACUITY_SCORE: 15
ADLS_ACUITY_SCORE: 16
ADLS_ACUITY_SCORE: 15

## 2020-05-08 ASSESSMENT — MIFFLIN-ST. JEOR: SCORE: 1831.13

## 2020-05-08 NOTE — PROVIDER NOTIFICATION
Notified Dr Brown about Elevated lactic acid and some urgency with voiding received orders for UA and repeat lactic in AM.

## 2020-05-08 NOTE — PROVIDER NOTIFICATION
Pt A-fib RVR hr 130-150's, Amio Bolus started, needed to slow rate per orders due to hypotension, BP stable able to complete bolus, gtt started, BP dropped again 86/63. Gtt temporarily stopped, BP 94/68, gtt restarted BP 86/69. Gtt stopped. MD paged x 2 awaiting call back.     1920: New orders received.

## 2020-05-08 NOTE — PLAN OF CARE
A&OX4, VSS, pain controlled with po pain meds.  Up with SBA 1.  Incisions ecchymotic CD&I with dermabond.  Abd soft with BS and passing flatus.  Still no BM after stool softeners/mag citrate.  Continuing to monitor.  Lungs clear, diminished, using IS. Adequate I&Os.

## 2020-05-08 NOTE — PROGRESS NOTES
AVSS on RA. Pain controlled with PO medications. Incisions CDI. LS clear and equal. Diet regular with Good appt. Up with assist of 1. BS active and audible; passing gas. Voiding to urinal; UA collected for concern of possible UTI. Amio gtt continued; pt tele sinus rhythm.

## 2020-05-08 NOTE — PROGRESS NOTES
[facility]  Cardiovascular and Thoracic Surgery Daily Note          Assessment and Plan:   POD#4 s/p Right mini thoracotomy, mitral valve repair with [place]uan Manuel NeoChords to the flail P2 segment, implantation of a 34 mm Leung Physio II annuloplasty ring, right common femoral arterial and venous cannulation for cardiopulmonary bypass, intraoperative PHILLIP by Dr. Evelin Coronel  -CVS: HR: 80s-90s. SBP: 110s-130s. Went into a.fib RVR, started on amiodarone gtt, converted to NSR within 1 hr, became hypotensive, gtt stopped, will place on PO this am, Pre op EF: 60-65%. ASA, BB. No statin as no CAD. Colchicine added daily for pericarditis prophylaxis. Continue lasix daily.    -Resp: Extubated within protocol. Saturating well on room air. Continue to encourage IS, cough, deep breathing, ambulation.   -Neuro:  Grossly intact. Pain controlled. Pt much more alert on exam today. Will continue to minimize narcotics as able by using alternative pain regimen.   -Renal: good uop. Close to preoperative weight. Cr: 0.70. Will continue PO lasix today and continue to monitor response  -GI:  Tolerating diet. +flatus. No BM. Continue bowel regimen, mag citrate this am  -:  Voiding well on own  -Endo: pre op a1c: 5.5. Continue sliding scale insulin  -FEN: replace electrolytes as needed. Na: 134. K: 4.0.   Orders Placed This Encounter      Advance Diet as Tolerated: Regular Diet Adult; Regular Diet Adult  -ID: Temp (24hrs), Av.7  F (37.1  C), Min:98  F (36.7  C), Max:99.7  F (37.6  C)  WBC: 18.2<20.4<23.7<28.5. completed perioperative abx. Leukocytosis likely related to stress from surgery  -Heme: Hgb: 12.4. plt: 196. Acute blood loss anemia and thrombocytopenia related to surgery  -Proph: PCD, ASA, BB, PPI, sub q heparin  -Dispo: [address]. Continue therapies. Continue to encourage IS, cough, deep breathing, ambulation. Home 1-2 days          Interval History:   Sitting up in bed, pain controlled, breathing fine,  "tolerating diet, moves slowly, needs BM          Medications:       aspirin  325 mg Oral Daily     colchicine  0.6 mg Oral Daily     divalproex sodium extended-release  1,000 mg Oral At Bedtime     furosemide  20 mg Oral Daily     heparin ANTICOAGULANT  5,000 Units Subcutaneous Q8H     insulin aspart  1-7 Units Subcutaneous TID AC     insulin aspart  1-5 Units Subcutaneous At Bedtime     lidocaine  1-2 patch Transdermal Q24H     lidocaine   Transdermal Q8H     metoprolol tartrate  25 mg Oral Q12H     pantoprazole  40 mg Oral QAM AC     polyethylene glycol  17 g Oral Daily     senna-docusate  1 tablet Oral BID    Or     senna-docusate  2 tablet Oral BID     sodium chloride (PF)  3 mL Intracatheter Q8H     @MEDRN-@          Physical Exam:   Vitals were reviewed  Blood pressure 131/87, pulse 85, temperature 98.6  F (37  C), temperature source Oral, resp. rate 14, height 1.803 m (5' 11\"), weight 102.9 kg (226 lb 13.7 oz), SpO2 98 %.  Rhythm: NSR    Lungs: course    Cardiovascular: s1/s2, no m/r/g    Abdomen: s/nt/nd    Extremeties: trace LE edema    Incision: cdi    CT: na    Weight:   Vitals:    05/05/20 0400 05/05/20 1800 05/06/20 0500 05/07/20 0700   Weight: 101.9 kg (224 lb 10.4 oz) 105.5 kg (232 lb 9.4 oz) 104.8 kg (231 lb 0.7 oz) 104.2 kg (229 lb 11.5 oz)    05/08/20 0500   Weight: 102.9 kg (226 lb 13.7 oz)            Data:   Labs:   Lab Results   Component Value Date    WBC 18.2 05/08/2020     Lab Results   Component Value Date    RBC 4.18 05/08/2020     Lab Results   Component Value Date    HGB 12.6 05/08/2020     Lab Results   Component Value Date    HCT 37.3 05/08/2020     No components found for: MCT  Lab Results   Component Value Date    MCV 89 05/08/2020     Lab Results   Component Value Date    MCH 30.1 05/08/2020     Lab Results   Component Value Date    MCHC 33.8 05/08/2020     Lab Results   Component Value Date    RDW 13.0 05/08/2020     Lab Results   Component Value Date     05/08/2020 "       Last Basic Metabolic Panel:  Lab Results   Component Value Date     05/08/2020      Lab Results   Component Value Date    POTASSIUM 4.0 05/08/2020     Lab Results   Component Value Date    CHLORIDE 101 05/08/2020     Lab Results   Component Value Date    RUEL 8.7 05/08/2020     Lab Results   Component Value Date    CO2 25 05/08/2020     Lab Results   Component Value Date    BUN 11 05/08/2020     Lab Results   Component Value Date    CR 0.70 05/08/2020     Lab Results   Component Value Date     05/08/2020       CXR: IMPRESSION: Since May 5, 2020, right-sided chest tubes have been  removed. Right IJ sheath has been removed. No definite pneumothorax  bilaterally. Scattered bibasilar opacities, right more so than left,  increased from previous exam. Trace bilateral pleural effusions.    Celina Norwood PA-C  Pager #: [phone]

## 2020-05-08 NOTE — PLAN OF CARE
Discharge Planner PT   Patient plan for discharge: Return home.  Current status: Pt completed bed mobility with SBA. Transfers and ambulation of 120 feet with FWW and CGA, noted decreased gait speed. Pt reported 5/10 on OMNI effort scale with exercise. Pt agreeable to sit up in chair at end of session.   Barriers to return to prior living situation: Decreased activity tolerance  Recommendations for discharge: Return home with OP CR Phase II  Rationale for recommendations: Anticipate pt will continue to progress towards independence in order to safely discharge home with assist of spouse as needed. Pt will benefit from OP CR in order to progress activity tolerance and independence with mobility.       Entered by: Cristel Lutz 05/08/2020 1:14 PM

## 2020-05-09 ENCOUNTER — APPOINTMENT (OUTPATIENT)
Dept: PHYSICAL THERAPY | Facility: CLINIC | Age: 65
DRG: 219 | End: 2020-05-09
Attending: SURGERY
Payer: COMMERCIAL

## 2020-05-09 VITALS
BODY MASS INDEX: 31.54 KG/M2 | TEMPERATURE: 97.9 F | HEART RATE: 80 BPM | DIASTOLIC BLOOD PRESSURE: 80 MMHG | SYSTOLIC BLOOD PRESSURE: 130 MMHG | OXYGEN SATURATION: 95 % | WEIGHT: 225.31 LBS | RESPIRATION RATE: 16 BRPM | HEIGHT: 71 IN

## 2020-05-09 PROBLEM — E87.70 FLUID OVERLOAD: Status: ACTIVE | Noted: 2020-05-09

## 2020-05-09 PROBLEM — I48.91 A-FIB (H): Status: ACTIVE | Noted: 2020-05-09

## 2020-05-09 PROBLEM — R73.9 TRANSIENT HYPERGLYCEMIA POST PROCEDURE: Status: ACTIVE | Noted: 2020-05-09

## 2020-05-09 LAB
ANION GAP SERPL CALCULATED.3IONS-SCNC: 7 MMOL/L (ref 3–14)
BUN SERPL-MCNC: 12 MG/DL (ref 7–30)
CALCIUM SERPL-MCNC: 8.5 MG/DL (ref 8.5–10.1)
CHLORIDE SERPL-SCNC: 103 MMOL/L (ref 94–109)
CO2 SERPL-SCNC: 26 MMOL/L (ref 20–32)
CREAT SERPL-MCNC: 0.74 MG/DL (ref 0.66–1.25)
ERYTHROCYTE [DISTWIDTH] IN BLOOD BY AUTOMATED COUNT: 12.9 % (ref 10–15)
GFR SERPL CREATININE-BSD FRML MDRD: >90 ML/MIN/{1.73_M2}
GLUCOSE BLDC GLUCOMTR-MCNC: 126 MG/DL (ref 70–99)
GLUCOSE SERPL-MCNC: 81 MG/DL (ref 70–99)
HCT VFR BLD AUTO: 35.4 % (ref 40–53)
HGB BLD-MCNC: 11.8 G/DL (ref 13.3–17.7)
MCH RBC QN AUTO: 29.7 PG (ref 26.5–33)
MCHC RBC AUTO-ENTMCNC: 33.3 G/DL (ref 31.5–36.5)
MCV RBC AUTO: 89 FL (ref 78–100)
PLATELET # BLD AUTO: 305 10E9/L (ref 150–450)
POTASSIUM SERPL-SCNC: 4.2 MMOL/L (ref 3.4–5.3)
RBC # BLD AUTO: 3.97 10E12/L (ref 4.4–5.9)
SODIUM SERPL-SCNC: 136 MMOL/L (ref 133–144)
WBC # BLD AUTO: 15.6 10E9/L (ref 4–11)

## 2020-05-09 PROCEDURE — 36415 COLL VENOUS BLD VENIPUNCTURE: CPT | Performed by: PHYSICIAN ASSISTANT

## 2020-05-09 PROCEDURE — 25000132 ZZH RX MED GY IP 250 OP 250 PS 637: Performed by: PHYSICIAN ASSISTANT

## 2020-05-09 PROCEDURE — 97110 THERAPEUTIC EXERCISES: CPT | Mod: GP | Performed by: PHYSICAL THERAPIST

## 2020-05-09 PROCEDURE — 25000128 H RX IP 250 OP 636: Performed by: PHYSICIAN ASSISTANT

## 2020-05-09 PROCEDURE — 00000146 ZZHCL STATISTIC GLUCOSE BY METER IP

## 2020-05-09 PROCEDURE — 80048 BASIC METABOLIC PNL TOTAL CA: CPT | Performed by: PHYSICIAN ASSISTANT

## 2020-05-09 PROCEDURE — 85027 COMPLETE CBC AUTOMATED: CPT | Performed by: PHYSICIAN ASSISTANT

## 2020-05-09 RX ORDER — AMOXICILLIN 250 MG
1 CAPSULE ORAL 2 TIMES DAILY PRN
Qty: 25 TABLET | Refills: 0 | Status: SHIPPED | OUTPATIENT
Start: 2020-05-09 | End: 2020-05-18

## 2020-05-09 RX ORDER — MAGNESIUM CARB/ALUMINUM HYDROX 105-160MG
148 TABLET,CHEWABLE ORAL ONCE
Status: COMPLETED | OUTPATIENT
Start: 2020-05-09 | End: 2020-05-09

## 2020-05-09 RX ORDER — AMIODARONE HYDROCHLORIDE 400 MG/1
400 TABLET ORAL 2 TIMES DAILY
Qty: 12 TABLET | Refills: 0 | Status: SHIPPED | OUTPATIENT
Start: 2020-05-09 | End: 2020-07-08

## 2020-05-09 RX ORDER — BISACODYL 10 MG
10 SUPPOSITORY, RECTAL RECTAL DAILY
Status: DISCONTINUED | OUTPATIENT
Start: 2020-05-09 | End: 2020-05-09 | Stop reason: HOSPADM

## 2020-05-09 RX ORDER — PANTOPRAZOLE SODIUM 40 MG/1
40 TABLET, DELAYED RELEASE ORAL
Qty: 14 TABLET | Refills: 0 | Status: SHIPPED | OUTPATIENT
Start: 2020-05-10 | End: 2020-07-08

## 2020-05-09 RX ORDER — AMIODARONE HYDROCHLORIDE 200 MG/1
200 TABLET ORAL DAILY
Qty: 30 TABLET | Refills: 0 | Status: SHIPPED | OUTPATIENT
Start: 2020-05-16 | End: 2020-09-10

## 2020-05-09 RX ORDER — ACETAMINOPHEN 325 MG/1
650 TABLET ORAL EVERY 4 HOURS PRN
Qty: 60 TABLET | Refills: 0 | Status: SHIPPED | OUTPATIENT
Start: 2020-05-09 | End: 2020-09-10

## 2020-05-09 RX ORDER — METHOCARBAMOL 500 MG/1
500 TABLET, FILM COATED ORAL 4 TIMES DAILY PRN
Qty: 30 TABLET | Refills: 0 | Status: SHIPPED | OUTPATIENT
Start: 2020-05-09 | End: 2020-05-18

## 2020-05-09 RX ORDER — POLYETHYLENE GLYCOL 3350 17 G/17G
17 POWDER, FOR SOLUTION ORAL DAILY
Qty: 7 PACKET | Refills: 0 | Status: SHIPPED | OUTPATIENT
Start: 2020-05-10 | End: 2020-05-18

## 2020-05-09 RX ORDER — METOPROLOL SUCCINATE 25 MG/1
25 TABLET, EXTENDED RELEASE ORAL 2 TIMES DAILY
Qty: 60 TABLET | Refills: 3 | Status: SHIPPED | OUTPATIENT
Start: 2020-05-09 | End: 2020-07-08

## 2020-05-09 RX ORDER — OXYCODONE HYDROCHLORIDE 5 MG/1
5 TABLET ORAL EVERY 6 HOURS PRN
Qty: 40 TABLET | Refills: 0 | Status: SHIPPED | OUTPATIENT
Start: 2020-05-09 | End: 2020-05-18

## 2020-05-09 RX ADMIN — MAGNESIUM CITRATE 148 ML: 1.75 LIQUID ORAL at 09:25

## 2020-05-09 RX ADMIN — METOPROLOL TARTRATE 25 MG: 25 TABLET, FILM COATED ORAL at 08:23

## 2020-05-09 RX ADMIN — PANTOPRAZOLE SODIUM 40 MG: 40 TABLET, DELAYED RELEASE ORAL at 08:23

## 2020-05-09 RX ADMIN — ASPIRIN 325 MG: 325 TABLET, COATED ORAL at 08:25

## 2020-05-09 RX ADMIN — BISACODYL 10 MG: 10 SUPPOSITORY RECTAL at 11:28

## 2020-05-09 RX ADMIN — OXYCODONE HYDROCHLORIDE 5 MG: 5 TABLET ORAL at 03:26

## 2020-05-09 RX ADMIN — OXYCODONE HYDROCHLORIDE 5 MG: 5 TABLET ORAL at 08:23

## 2020-05-09 RX ADMIN — HEPARIN SODIUM 5000 UNITS: 5000 INJECTION, SOLUTION INTRAVENOUS; SUBCUTANEOUS at 08:23

## 2020-05-09 RX ADMIN — AMIODARONE HYDROCHLORIDE 400 MG: 200 TABLET ORAL at 08:24

## 2020-05-09 RX ADMIN — SENNOSIDES AND DOCUSATE SODIUM 2 TABLET: 8.6; 5 TABLET ORAL at 08:24

## 2020-05-09 RX ADMIN — METHOCARBAMOL 500 MG: 500 TABLET, FILM COATED ORAL at 03:26

## 2020-05-09 RX ADMIN — POLYETHYLENE GLYCOL 3350 17 G: 17 POWDER, FOR SOLUTION ORAL at 08:25

## 2020-05-09 RX ADMIN — FUROSEMIDE 20 MG: 20 TABLET ORAL at 08:25

## 2020-05-09 ASSESSMENT — ACTIVITIES OF DAILY LIVING (ADL)
ADLS_ACUITY_SCORE: 16
ADLS_ACUITY_SCORE: 15

## 2020-05-09 ASSESSMENT — MIFFLIN-ST. JEOR: SCORE: 1824.13

## 2020-05-09 NOTE — PLAN OF CARE
Discharge Planner PT   Patient plan for discharge: Return home.   Current status: Pt reported R sided pain of 5/0 at rest. Pt completed bed mobility and transfers with FWW and SBA. Pt tolerated ambulation of 8 minutes (approx 150 feet) with FWW and SBA, pt reported 4/10 on OMNI effort scale. Reviewed thoracotomy exercises.   Barriers to return to prior living situation: Decreased activity tolerance  Recommendations for discharge: Return home with spouse and OP CR Phase II  Rationale for recommendations: Pt would benefit from continued skilled intervention for education on heart healthy lifestyle modifications as well as progressive, monitored aerobic exercise.        Entered by: Cristel Lutz 05/09/2020 9:17 AM

## 2020-05-09 NOTE — DISCHARGE SUMMARY
"Discharge Summary    Kumar Payne MRN# [id number]   YOB: 1955 Age: 64 year old     Date of Admission:  5/4/2020  Date of Discharge:  5/9/2020  Admitting Physician:  Evelin Coronel MD  Discharge Physician:  Evelin Coronel,*  Discharging Service:  Cardiovascular and Thoracic Surgery     Home clinic: [facility]  Primary Provider: Noah Rangel          Admission Diagnoses:   Mitral valve prolapse [I34.1]          Discharge Diagnosis:   Patient Active Problem List   Diagnosis     Bipolar disorder (H)     Hyperlipidemia LDL goal <100     Heart valve disease     Status post coronary angiogram     Mitral valve prolapse     Chronic diarrhea     S/P MVR (mitral valve repair)     Fluid overload     Transient hyperglycemia post procedure     A-fib (H)                Discharge Disposition:   Discharged to home           Condition on Discharge:   Discharge condition: Stable   Discharge vitals: Blood pressure 121/80, pulse 84, temperature 98.2  F (36.8  C), resp. rate 16, height 1.803 m (5' 11\"), weight 102.2 kg (225 lb 5 oz), SpO2 97 %.     Code status on discharge: Full Code           Procedures:   On 5/4/20 Mr Payne successfully underwent Right mini thoracotomy, mitral valve repair with [place]-Juan Manuel NeoChords to the flail P2 segment, implantation of a 34 mm Leung Physio II annuloplasty ring, right common femoral arterial and venous cannulation for cardiopulmonary bypass, intraoperative PHILLIP by Dr. Evelin Coronel          Medications Prior to Admission:     Medications Prior to Admission   Medication Sig Dispense Refill Last Dose     aspirin (ASA) 325 MG EC tablet Take 325 mg by mouth daily as needed for moderate pain (headache)   Past Week at PRN     divalproex sodium extended-release (DEPAKOTE ER) 500 MG 24 hr tablet Take 1,000 mg by mouth every evening (takes 2 x 500mg)   5/2/2020 at PM     [DISCONTINUED] acetaminophen (TYLENOL) 325 MG tablet Take 325 mg by mouth 4 times daily as " needed    Past Week at PRN             Discharge Medications:     Current Discharge Medication List      START taking these medications    Details   !! amiodarone (PACERONE) 200 MG tablet Take 1 tablet (200 mg) by mouth daily  Qty: 30 tablet, Refills: 0    Associated Diagnoses: S/P MVR (mitral valve repair)      !! amiodarone (PACERONE) 400 MG tablet Take 1 tablet (400 mg) by mouth 2 times daily for 6 days  Qty: 12 tablet, Refills: 0    Associated Diagnoses: S/P MVR (mitral valve repair)      methocarbamol (ROBAXIN) 500 MG tablet Take 1 tablet (500 mg) by mouth 4 times daily as needed for muscle spasms  Qty: 30 tablet, Refills: 0    Associated Diagnoses: S/P MVR (mitral valve repair)      metoprolol succinate ER (TOPROL-XL) 25 MG 24 hr tablet Take 1 tablet (25 mg) by mouth 2 times daily  Qty: 60 tablet, Refills: 3    Associated Diagnoses: S/P MVR (mitral valve repair)      oxyCODONE (ROXICODONE) 5 MG tablet Take 1 tablet (5 mg) by mouth every 6 hours as needed for moderate to severe pain  Qty: 40 tablet, Refills: 0    Associated Diagnoses: S/P MVR (mitral valve repair)      pantoprazole (PROTONIX) 40 MG EC tablet Take 1 tablet (40 mg) by mouth every morning (before breakfast) for 14 days  Qty: 14 tablet, Refills: 0    Associated Diagnoses: S/P MVR (mitral valve repair)      polyethylene glycol (MIRALAX) 17 g packet Take 17 g by mouth daily  Qty: 7 packet, Refills: 0    Associated Diagnoses: S/P MVR (mitral valve repair)      senna-docusate (SENOKOT-S/PERICOLACE) 8.6-50 MG tablet Take 1 tablet by mouth 2 times daily as needed for constipation  Qty: 25 tablet, Refills: 0    Associated Diagnoses: S/P MVR (mitral valve repair)       !! - Potential duplicate medications found. Please discuss with provider.      CONTINUE these medications which have CHANGED    Details   acetaminophen (TYLENOL) 325 MG tablet Take 2 tablets (650 mg) by mouth every 4 hours as needed for mild pain  Qty: 60 tablet, Refills: 0    Associated  Diagnoses: S/P MVR (mitral valve repair)         CONTINUE these medications which have NOT CHANGED    Details   aspirin (ASA) 325 MG EC tablet Take 325 mg by mouth daily as needed for moderate pain (headache)      divalproex sodium extended-release (DEPAKOTE ER) 500 MG 24 hr tablet Take 1,000 mg by mouth every evening (takes 2 x 500mg)                   Consultations:   Nutrition, Intensivist             Brief History of Illness:   Mr. Payne is a very pleasant 64-year-old otherwise healthy gentleman whom I saw back in March for severe mitral valve regurgitation.  He was taken to the operating room today for a minimally invasive mitral valve repair.          Hospital Course:   On 5/4/20 Mr Payne successfully underwent Right mini thoracotomy, mitral valve repair with [place]-Juan Manuel NeoChords to the flail P2 segment, implantation of a 34 mm Leung Physio II annuloplasty ring, right common femoral arterial and venous cannulation for cardiopulmonary bypass, intraoperative PHILLIP by Dr. Evelin Coronel  Was extubated within 24 hours of surgery. Once he was weaned off hemodynamic stabilizing gtt's transferred to the surgical floor.   Had some transient hyperglycemia treated with an insulin gtt, transitioned to   sliding scale insulin and has no need at discharge.  Had some fluid overload treated with diuretic medication. At discharge he is 2 kg below their pre-op weight and is not sent with any diuretics.   Heart rhythm remained flopped into a.fib, was started on amiodarone gtt converted to NSR within 1 hr of starting the gtt, not anticoagulated. Sent on PO amiodarone for 30 days. Should follow up with Tri Fregoso in [facility]. He progressed well with cardiac rehab. They are discharged to home on pod# 5 with the help of their family.              Significant Results:   Lab Results   Component Value Date    WBC 15.6 05/09/2020     Lab Results   Component Value Date    RBC 3.97 05/09/2020     Lab Results   Component Value Date     HGB 11.8 05/09/2020     Lab Results   Component Value Date    HCT 35.4 05/09/2020     No components found for: MCT  Lab Results   Component Value Date    MCV 89 05/09/2020     Lab Results   Component Value Date    MCH 29.7 05/09/2020     Lab Results   Component Value Date    MCHC 33.3 05/09/2020     Lab Results   Component Value Date    RDW 12.9 05/09/2020     Lab Results   Component Value Date     05/09/2020       Last Basic Metabolic Panel:  Lab Results   Component Value Date     05/09/2020      Lab Results   Component Value Date    POTASSIUM 4.2 05/09/2020     Lab Results   Component Value Date    CHLORIDE 103 05/09/2020     Lab Results   Component Value Date    RUEL 8.5 05/09/2020     Lab Results   Component Value Date    CO2 26 05/09/2020     Lab Results   Component Value Date    BUN 12 05/09/2020     Lab Results   Component Value Date    CR 0.74 05/09/2020     Lab Results   Component Value Date    GLC 81 05/09/2020                  Pending Results:   None           Discharge Instructions and Follow-Up:   Discharge diet: Regular   Discharge activity: Daily weights: Call if weight gain 2-3 lbs over 24 hours or if greater than 5 lbs in 1 week.  No lifting more than 10 lbs for 8-12 weeks.  No driving for 1 month.  Call for pain medication refill.  Call for temperature greater than 101.0  Daily shower with antibacterial soap.   Discharge follow-up: *Follow up primary care provider in 7-10 days after discharge in order to review your medication, vital signs, obtain any necessary lab work your doctor may want, and to update them on your hospitalization and medical issues.   *Follow up with Negro with Dr Coronel, heart surgeon, at [facility] at [facility] [address] on 5/18/20 at 1:30 via telephone. If any questions or concerns call [phone].  You will see us once at this visit and then if everything is going well you will not need to see us again.  You will follow  long term with your cardiologist.   *Follow up with Tri Fregoso in 2 months in [facility]. This is who you will follow with long term about your heart issues. [phone].   *Please schedule outpatient cardiac rehab within 5 days of discharge from TCU, call [phone].   Outpatient therapy: Cardiac rehab   Home Care agency: None    Supplies and equipment: None   Lines and drains: None    Wound care: Wash incision daily with antibacterial soap   Other instructions: None

## 2020-05-09 NOTE — PLAN OF CARE
PT/CR: Patient is dressed and awaiting discharge. Pt reports he has no concerns with discharging home. Will defer further PT to OP CR Phase II.    Physical Therapy Discharge Summary    Reason for therapy discharge:    Discharged to home with outpatient therapy.    Progress towards therapy goal(s). See goals on Care Plan in [facility] electronic health record for goal details.  Goals not met.  Barriers to achieving goals:   discharge from facility.    Therapy recommendation(s):    Continued therapy is recommended.  Rationale/Recommendations:  Pt will benefit from OP CR Phase II in order to progress activity tolerance and independence with mobility.

## 2020-05-09 NOTE — PLAN OF CARE
"Pt A&OX4.  VSS.  Pain controlled with po pain meds.  2 soft/loose BMs after bowel regiment.  Incisions CD&I with dermabond, ecchymosis.  Up independently.  Lungs CTA, diminished in bases, using IS.  All discharge instructions reviewed and well received by patient. Awaiting wife to  for discharge.  Wife to call this nurse when non-driving to review discharge instructions although, \"someone has already called my wife on speaker to review discharge instructions previously.\"  "

## 2020-05-09 NOTE — PROGRESS NOTES
CV Surgery    S: Seen up in bed, no BM yet, suppository vs enema today, breathing fine, discharge once BM occurs    O: B/P: 114/73, T: 97.6, P: 78, R: 16  General: NAD  Heart: s1/s2, no m/r/g  Lungs: clear  Abd: s/nt/nd  Sternum: cdi  Extremities: no LE edema    Lab Results   Component Value Date    WBC 15.6 05/09/2020     Lab Results   Component Value Date    RBC 3.97 05/09/2020     Lab Results   Component Value Date    HGB 11.8 05/09/2020     Lab Results   Component Value Date    HCT 35.4 05/09/2020     No components found for: MCT  Lab Results   Component Value Date    MCV 89 05/09/2020     Lab Results   Component Value Date    MCH 29.7 05/09/2020     Lab Results   Component Value Date    MCHC 33.3 05/09/2020     Lab Results   Component Value Date    RDW 12.9 05/09/2020     Lab Results   Component Value Date     05/09/2020       Last Basic Metabolic Panel:  Lab Results   Component Value Date     05/09/2020      Lab Results   Component Value Date    POTASSIUM 4.2 05/09/2020     Lab Results   Component Value Date    CHLORIDE 103 05/09/2020     Lab Results   Component Value Date    RUEL 8.5 05/09/2020     Lab Results   Component Value Date    CO2 26 05/09/2020     Lab Results   Component Value Date    BUN 12 05/09/2020     Lab Results   Component Value Date    CR 0.74 05/09/2020     Lab Results   Component Value Date    GLC 81 05/09/2020       A/P: POD# 5 s/p Right mini thoracotomy, mitral valve repair with [place]-Juan Manuel NeoChords to the flail P2 segment, implantation of a 34 mm Leung Physio II annuloplasty ring, right common femoral arterial and venous cannulation for cardiopulmonary bypass, intraoperative PHILLIP by Dr. Evelin Norwood, GREG  Pager [phone]

## 2020-05-09 NOTE — PLAN OF CARE
Pain well controlled with Robaxin q8h and 5 mg Oxy q4h. Tele=NSR, Voiding per urinal at the bedside.

## 2020-05-09 NOTE — DISCHARGE INSTRUCTIONS
If an appointment was recommended for you please call ASAP to schedule.   DUE TO COVID-19 PANDEMIC, MANY CLINICS ARE TEMPORARILY NOT TAKING IN-PERSON APPOINTMENTS. The clinic may schedule you for a virtual or phone visit--if this is the case, please answer your phone. If you develop any symptoms or have any followup questions please call your primary care clinic. If it is an emergency, please dial 911.      It is very important to check in with your provider--during a phone or clinic visit, talk about your hospital stay.  Tell the clinic provider how you feel.  Talk about the medications listed on the discharge papers.  Your doctor will update records, make sure you are still doing OK, and decide if any tests or medication changes are needed.          SEE POST OP HEART EDUCATION HANDOUTS

## 2020-05-11 ENCOUNTER — TELEPHONE (OUTPATIENT)
Dept: FAMILY MEDICINE | Facility: OTHER | Age: 65
End: 2020-05-11

## 2020-05-11 ENCOUNTER — TELEPHONE (OUTPATIENT)
Dept: OTHER | Facility: CLINIC | Age: 65
End: 2020-05-11

## 2020-05-11 NOTE — TELEPHONE ENCOUNTER
ED / Discharge Outreach Protocol    Patient Contact    Attempt # 1    Was call answered?  No.  Left message on voicemail with information to call me back.    Ramos Zarate, RN, BSN

## 2020-05-11 NOTE — TELEPHONE ENCOUNTER
Reason for follow up call: Kumar JAVIER Kerry appeared on our list for being seen in and recenlty discharge from the Emergency Room/Hospital.    Chief Complaint   Patient presents with     Hospital F/U     Mitral Valve Prolapse, S/P Mvr (Mitral Valve Repair)       Encounter routed for Clinic Triage RN to call for follow up

## 2020-05-11 NOTE — TELEPHONE ENCOUNTER
48 hour post op call    ACTIVITY  How are you doing with activity? Yes I am getting around my house well.   Are you continuing to use your IS 3-4 times a day and do you have any shortness of breath. I have a dry cough. I have not been doing my IS that much. Instructed to do IS 4-6 times a day with splinting and coughing afterwards.   Are you weighing yourself daily and has it been stable?. I have lost 3 lbs.     PAIN  How is your pain, what are you doing for your pain? I an doing well with tylenol and the muscle relaxer. I do not like the oxycodone.     Are you still doing sternal precautions? Minimally Invasive.    Do you hear any clicking when you are moving or taking a deep breath? NA     INCISION: It is healing well.     ASSISTANCE  Do you have someone at home to help you with your daily activities and transportation needs? My wife.       MEDICATIONS  I would like to review your discharge medications and answer any questions you may have.   Reviewed     Are you on a blood thinner/Coumadin  No    Who is managing your Coumadin dosing/INR? No       FOLLOW UP       Scheduled for cardiac rehab? 5/15   Scheduled to see our PA or Surgeon? 5/18  Scheduled to see your cardiologist ? Michael 7/8   Scheduled to see your primary care physician? Not yet  Do you have our contact information? Yes     STOPLIGHT TOOL      Were you happy with your care? Yes but I felt the nurse were stretched to thin.   Could we have done anything better? NA     Patient stated he urinated bright red blood with a stinging cessation 5/9. It has slowed down and is clear today. Instructed to call if if blood reoccurs, burring or fever.

## 2020-05-12 LAB — INTERPRETATION ECG - MUSE: NORMAL

## 2020-05-12 NOTE — TELEPHONE ENCOUNTER
ED / Discharge Outreach Protocol    Patient Contact    Attempt # 2    Was call answered?  No.  Left message on voicemail with information to call me back.    Ramos Zarate, RN, BSN

## 2020-05-12 NOTE — PROGRESS NOTES
"Kumar Payne is a 64 year old male who is being evaluated via a billable telephone visit.      The patient has been notified of following:     \"This telephone visit will be conducted via a call between you and your physician/provider. We have found that certain health care needs can be provided without the need for a physical exam.  This service lets us provide the care you need with a short phone conversation.  If a prescription is necessary we can send it directly to your pharmacy.  If lab work is needed we can place an order for that and you can then stop by our lab to have the test done at a later time.    Telephone visits are billed at different rates depending on your insurance coverage. During this emergency period, for some insurers they may be billed the same as an in-person visit.  Please reach out to your insurance provider with any questions.    If during the course of the call the physician/provider feels a telephone visit is not appropriate, you will not be charged for this service.\"    Patient has given verbal consent for Telephone visit?  Yes    What phone number would you like to be contacted at? [phone]    How would you like to obtain your AVS? Mail a copy    Subjective     Kumar Payne is a 64 year old male who presents to clinic today for the following health issues:    HPI  Hyperlipidemia Follow-Up      Are you regularly taking any medication or supplement to lower your cholesterol?   No    Are you having muscle aches or other side effects that you think could be caused by your cholesterol lowering medication?  n/a      How many servings of fruits and vegetables do you eat daily?  0-1    On average, how many sweetened beverages do you drink each day (Examples: soda, juice, sweet tea, etc.  Do NOT count diet or artificially sweetened beverages)?   0    How many days per week do you exercise enough to make your heart beat faster? 3 or less    How many minutes a day do you exercise enough " to make your heart beat faster? 9 or less    How many days per week do you miss taking your medication? 0      Hospital Follow-up Visit:    Hospital/Nursing Home/IP Rehab Facility: [facility]  Date of Admission: 05/04/2020  Date of Discharge: 05/09/2020  Reason(s) for Admission: Mitral Valva Prolapse      Was your hospitalization related to COVID-19? No   Problems taking medications regularly:  None  Medication changes since discharge: changes to medications patient no sure  Problems adhering to non-medication therapy:  None    Summary of hospitalization:  [facility] discharge summary reviewed  Diagnostic Tests/Treatments reviewed.  Follow up needed: cardiology as needed  Other Healthcare Providers Involved in Patient s Care:         None  Update since discharge: improved. Post Discharge Medication Reconciliation: discharge medications reconciled, continue medications without change.  Plan of care communicated with patient          Patient Active Problem List   Diagnosis     Bipolar disorder (H)     Hyperlipidemia LDL goal <100     Heart valve disease     Status post coronary angiogram     Mitral valve prolapse     Chronic diarrhea     S/P MVR (mitral valve repair)     Fluid overload     Transient hyperglycemia post procedure     A-fib (H)     Chest wall pain     Past Surgical History:   Procedure Laterality Date     COLONOSCOPY N/A 4/1/2020    Procedure: Colonoscopy with biopsy;  Surgeon: Sebastián Nam MD;  Location:  GI     CV CORONARY ANGIOGRAM N/A 3/16/2020    Procedure: Coronary Angiogram;  Surgeon: Tyrone Fournier MD;  Location:  HEART CARDIAC CATH LAB     REPAIR VALVE MITRAL MINIMALLY INVASIVE N/A 5/4/2020    Procedure: MINIMALLY INVASIVE MITRAL VALVE REPAIR WITH PHYSIO RING SIZE 34 MM, ON PUMP WITH PHILLIP READ BY DR MOISE (CARDIOLOGY).;  Surgeon: Evelin Coronel MD;  Location:  OR       Social History     Tobacco Use     Smoking status: Never Smoker     Smokeless  tobacco: Never Used   Substance Use Topics     Alcohol use: Yes     Alcohol/week: 0.0 standard drinks     Comment: 1-2 times a year     History reviewed. No pertinent family history.      Current Outpatient Medications   Medication Sig Dispense Refill     acetaminophen (TYLENOL) 325 MG tablet Take 2 tablets (650 mg) by mouth every 4 hours as needed for mild pain 60 tablet 0     [START ON 5/16/2020] amiodarone (PACERONE) 200 MG tablet Take 1 tablet (200 mg) by mouth daily 30 tablet 0     amiodarone (PACERONE) 400 MG tablet Take 1 tablet (400 mg) by mouth 2 times daily for 6 days 12 tablet 0     aspirin (ASA) 325 MG EC tablet Take 325 mg by mouth daily as needed for moderate pain (headache)       divalproex sodium extended-release (DEPAKOTE ER) 500 MG 24 hr tablet Take 1,000 mg by mouth every evening (takes 2 x 500mg)       methocarbamol (ROBAXIN) 500 MG tablet Take 1 tablet (500 mg) by mouth 4 times daily as needed for muscle spasms 30 tablet 0     metoprolol succinate ER (TOPROL-XL) 25 MG 24 hr tablet Take 1 tablet (25 mg) by mouth 2 times daily 60 tablet 3     oxyCODONE (ROXICODONE) 5 MG tablet Take 1 tablet (5 mg) by mouth every 6 hours as needed for moderate to severe pain 40 tablet 0     pantoprazole (PROTONIX) 40 MG EC tablet Take 1 tablet (40 mg) by mouth every morning (before breakfast) for 14 days 14 tablet 0     polyethylene glycol (MIRALAX) 17 g packet Take 17 g by mouth daily 7 packet 0     senna-docusate (SENOKOT-S/PERICOLACE) 8.6-50 MG tablet Take 1 tablet by mouth 2 times daily as needed for constipation 25 tablet 0     No Known Allergies  Recent Labs   Lab Test 05/09/20  0642 05/08/20  0941  05/05/20  0430 05/04/20  1340  04/28/20  1011 03/17/20  0931  03/12/13  1820   A1C  --   --   --   --   --   --  5.5  --   --   --    ALT  --   --   --  41 44  --  36 52   < >  --    CR 0.74 0.70   < > 0.86 0.83   < > 0.85  --    < > 0.88   GFRESTIMATED >90 >90   < > 90 >90   < > >90  --    < > 89   GFRESTBLACK  >90 >90   < > >90 >90   < > >90  --    < > >90   POTASSIUM 4.2 4.0   < > 5.1 4.5   < > 4.1  --    < > 3.9   TSH  --   --   --   --   --   --   --  0.24*  --  1.42    < > = values in this interval not displayed.      BP Readings from Last 3 Encounters:   05/09/20 130/80   04/29/20 116/68   04/01/20 126/76    Wt Readings from Last 3 Encounters:   05/09/20 102.2 kg (225 lb 5 oz)   04/29/20 104.4 kg (230 lb 3.2 oz)   04/28/20 102.1 kg (225 lb)                    Reviewed and updated as needed this visit by Provider         Review of Systems   Constitutional, HEENT, cardiovascular, pulmonary, GI, , musculoskeletal, neuro, skin, endocrine and psych systems are negative, except as otherwise noted.       Objective   Reported vitals:  There were no vitals taken for this visit.   healthy, alert and no distress  PSYCH: Alert and oriented times 3; coherent speech, normal   rate and volume, able to articulate logical thoughts, able   to abstract reason, no tangential thoughts, no hallucinations   or delusions  His affect is normal and pleasant  RESP: No cough, no audible wheezing, able to talk in full sentences  Remainder of exam unable to be completed due to telephone visits    Diagnostic Test Results:  Labs reviewed in Epic  No results found for this or any previous visit (from the past 24 hour(s)).        Assessment/Plan:  1. Atrial fibrillation, unspecified type (H)  2. Heart valve disease  3. Hyperlipidemia LDL goal <100  4. Mitral valve prolapse  5. Status post coronary angiogram  6. S/P MVR (mitral valve repair)  7. Chest wall pain  Patient is doing very well all things considered.  He states he still is having some chest wall pain and has some questions related to this.  Overall he is managing this with some Tylenol and occasional muscle relaxants.  Advised that it would be wise for him to use the pain medication he has available if he is having trouble with sleeping otherwise I would have him try to work through it as  best he can so as not to develop any dependence upon the medication.  He does have some labs that would be wise to get rechecked namely a CBC with differential due to elevation white blood cell count noted during hospitalization and making sure that this is come down to normal.  Would also like to have him get a comprehensive metabolic profile to monitor blood sugar and kidney and liver function post surgical intervention.  Follow-up in 2 to 3 months is advised.  He states he does not need any refills at this point time I encouraged him to call his pharmacy for refills when the time came.  - CBC with platelets and differential; Future  - Comprehensive metabolic panel; Future      Return in about 2 months (around 7/15/2020) for Medication Recheck, recheck of current condition, if symptoms do not improve.      Phone call duration:  14 minutes    Noah Michaud PA-C

## 2020-05-15 ENCOUNTER — VIRTUAL VISIT (OUTPATIENT)
Dept: FAMILY MEDICINE | Facility: OTHER | Age: 65
End: 2020-05-15
Payer: COMMERCIAL

## 2020-05-15 ENCOUNTER — HOSPITAL ENCOUNTER (OUTPATIENT)
Dept: CARDIAC REHAB | Facility: CLINIC | Age: 65
End: 2020-05-15
Attending: PHYSICIAN ASSISTANT
Payer: COMMERCIAL

## 2020-05-15 DIAGNOSIS — I38 HEART VALVE DISEASE: ICD-10-CM

## 2020-05-15 DIAGNOSIS — E78.5 HYPERLIPIDEMIA LDL GOAL <100: ICD-10-CM

## 2020-05-15 DIAGNOSIS — I48.91 ATRIAL FIBRILLATION, UNSPECIFIED TYPE (H): Primary | ICD-10-CM

## 2020-05-15 DIAGNOSIS — I34.1 MITRAL VALVE PROLAPSE: ICD-10-CM

## 2020-05-15 DIAGNOSIS — R07.89 CHEST WALL PAIN: ICD-10-CM

## 2020-05-15 DIAGNOSIS — Z98.890 S/P MVR (MITRAL VALVE REPAIR): ICD-10-CM

## 2020-05-15 DIAGNOSIS — Z98.890 STATUS POST CORONARY ANGIOGRAM: ICD-10-CM

## 2020-05-15 PROCEDURE — 40000116 ZZH STATISTIC OP CR VISIT

## 2020-05-15 PROCEDURE — 93797 PHYS/QHP OP CAR RHAB WO ECG: CPT

## 2020-05-15 PROCEDURE — 93798 PHYS/QHP OP CAR RHAB W/ECG: CPT

## 2020-05-15 PROCEDURE — 40000575 ZZH STATISTIC OP CARDIAC VISIT #2

## 2020-05-15 PROCEDURE — 99214 OFFICE O/P EST MOD 30 MIN: CPT | Mod: 95 | Performed by: PHYSICIAN ASSISTANT

## 2020-05-19 ENCOUNTER — TELEPHONE (OUTPATIENT)
Dept: CARDIOLOGY | Facility: CLINIC | Age: 65
End: 2020-05-19

## 2020-05-19 DIAGNOSIS — E78.5 HYPERLIPIDEMIA LDL GOAL <100: ICD-10-CM

## 2020-05-19 DIAGNOSIS — Z98.890 S/P MVR (MITRAL VALVE REPAIR): ICD-10-CM

## 2020-05-19 DIAGNOSIS — I34.1 MITRAL VALVE PROLAPSE: ICD-10-CM

## 2020-05-19 DIAGNOSIS — Z98.890 STATUS POST CORONARY ANGIOGRAM: ICD-10-CM

## 2020-05-19 DIAGNOSIS — I38 HEART VALVE DISEASE: ICD-10-CM

## 2020-05-19 DIAGNOSIS — I48.91 ATRIAL FIBRILLATION, UNSPECIFIED TYPE (H): ICD-10-CM

## 2020-05-19 DIAGNOSIS — D72.823 LEUKEMOID REACTION: Primary | ICD-10-CM

## 2020-05-19 LAB
ALBUMIN SERPL-MCNC: 3.6 G/DL (ref 3.4–5)
ALP SERPL-CCNC: 103 U/L (ref 40–150)
ALT SERPL W P-5'-P-CCNC: 51 U/L (ref 0–70)
ANION GAP SERPL CALCULATED.3IONS-SCNC: 7 MMOL/L (ref 3–14)
AST SERPL W P-5'-P-CCNC: 29 U/L (ref 0–45)
BASOPHILS # BLD AUTO: 0.1 10E9/L (ref 0–0.2)
BASOPHILS NFR BLD AUTO: 0.7 %
BILIRUB SERPL-MCNC: 0.3 MG/DL (ref 0.2–1.3)
BUN SERPL-MCNC: 12 MG/DL (ref 7–30)
CALCIUM SERPL-MCNC: 8.1 MG/DL (ref 8.5–10.1)
CHLORIDE SERPL-SCNC: 108 MMOL/L (ref 94–109)
CO2 SERPL-SCNC: 23 MMOL/L (ref 20–32)
CREAT SERPL-MCNC: 0.79 MG/DL (ref 0.66–1.25)
DIFFERENTIAL METHOD BLD: ABNORMAL
EOSINOPHIL NFR BLD AUTO: 2.2 %
ERYTHROCYTE [DISTWIDTH] IN BLOOD BY AUTOMATED COUNT: 13.1 % (ref 10–15)
GFR SERPL CREATININE-BSD FRML MDRD: >90 ML/MIN/{1.73_M2}
GLUCOSE SERPL-MCNC: 106 MG/DL (ref 70–99)
HCT VFR BLD AUTO: 42.3 % (ref 40–53)
HGB BLD-MCNC: 14 G/DL (ref 13.3–17.7)
IMM GRANULOCYTES # BLD: 0.2 10E9/L (ref 0–0.4)
IMM GRANULOCYTES NFR BLD: 1.5 %
LYMPHOCYTES # BLD AUTO: 2.6 10E9/L (ref 0.8–5.3)
LYMPHOCYTES NFR BLD AUTO: 16.4 %
MCH RBC QN AUTO: 30.3 PG (ref 26.5–33)
MCHC RBC AUTO-ENTMCNC: 33.1 G/DL (ref 31.5–36.5)
MCV RBC AUTO: 92 FL (ref 78–100)
MONOCYTES # BLD AUTO: 1.6 10E9/L (ref 0–1.3)
MONOCYTES NFR BLD AUTO: 10 %
NEUTROPHILS # BLD AUTO: 11.1 10E9/L (ref 1.6–8.3)
NEUTROPHILS NFR BLD AUTO: 69.2 %
NRBC # BLD AUTO: 0 10*3/UL
NRBC BLD AUTO-RTO: 0 /100
PLATELET # BLD AUTO: 518 10E9/L (ref 150–450)
POTASSIUM SERPL-SCNC: 4.1 MMOL/L (ref 3.4–5.3)
PROT SERPL-MCNC: 7.1 G/DL (ref 6.8–8.8)
RBC # BLD AUTO: 4.62 10E12/L (ref 4.4–5.9)
SODIUM SERPL-SCNC: 138 MMOL/L (ref 133–144)
WBC # BLD AUTO: 16 10E9/L (ref 4–11)

## 2020-05-19 PROCEDURE — 36415 COLL VENOUS BLD VENIPUNCTURE: CPT | Performed by: PHYSICIAN ASSISTANT

## 2020-05-19 PROCEDURE — 85025 COMPLETE CBC W/AUTO DIFF WBC: CPT | Performed by: PHYSICIAN ASSISTANT

## 2020-05-19 PROCEDURE — 80053 COMPREHEN METABOLIC PANEL: CPT | Performed by: PHYSICIAN ASSISTANT

## 2020-05-19 NOTE — TELEPHONE ENCOUNTER
Patient's spouse calling stating patient had lab work drawn today ordered by patient's PCP MENDY Wilson. Patient's WBC was a little elevated and Noah recommended patient get in touch with his cardiologist. Patient is s/p mitral valve repair done on 5/4/2020.    Component      Latest Ref Rng & Units 5/19/2020   WBC      4.0 - 11.0 10e9/L 16.0 (H)   RBC Count      4.4 - 5.9 10e12/L 4.62   Hemoglobin      13.3 - 17.7 g/dL 14.0   Hematocrit      40.0 - 53.0 % 42.3   MCV      78 - 100 fl 92   MCH      26.5 - 33.0 pg 30.3   MCHC      31.5 - 36.5 g/dL 33.1   RDW      10.0 - 15.0 % 13.1   Platelet Count      150 - 450 10e9/L 518 (H)   Diff Method       Automated Method   % Neutrophils      % 69.2   % Lymphocytes      % 16.4   % Monocytes      % 10.0   % Eosinophils      % 2.2   % Basophils      % 0.7   % Immature Granulocytes      % 1.5   Nucleated RBCs      0 /100 0   Absolute Neutrophil      1.6 - 8.3 10e9/L 11.1 (H)   Absolute Lymphocytes      0.8 - 5.3 10e9/L 2.6   Absolute Monocytes      0.0 - 1.3 10e9/L 1.6 (H)   Absolute Basophils      0.0 - 0.2 10e9/L 0.1   Abs Immature Granulocytes      0 - 0.4 10e9/L 0.2   Absolute Nucleated RBC       0.0   Sodium      133 - 144 mmol/L 138   Potassium      3.4 - 5.3 mmol/L 4.1   Chloride      94 - 109 mmol/L 108   Carbon Dioxide      20 - 32 mmol/L 23   Anion Gap      3 - 14 mmol/L 7   Glucose      70 - 99 mg/dL 106 (H)   Urea Nitrogen      7 - 30 mg/dL 12   Creatinine      0.66 - 1.25 mg/dL 0.79   GFR Estimate      >60 mL/min/1.73:m2 >90   GFR Estimate If Black      >60 mL/min/1.73:m2 >90   Calcium      8.5 - 10.1 mg/dL 8.1 (L)   Bilirubin Total      0.2 - 1.3 mg/dL 0.3   Albumin      3.4 - 5.0 g/dL 3.6   Protein Total      6.8 - 8.8 g/dL 7.1   Alkaline Phosphatase      40 - 150 U/L 103   ALT      0 - 70 U/L 51   AST      0 - 45 U/L 29       Will route to patient's cardiology team.

## 2020-05-20 DIAGNOSIS — D72.823 LEUKEMOID REACTION: ICD-10-CM

## 2020-05-20 LAB
ALBUMIN UR-MCNC: NEGATIVE MG/DL
APPEARANCE UR: CLEAR
BILIRUB UR QL STRIP: NEGATIVE
COLOR UR AUTO: YELLOW
GLUCOSE UR STRIP-MCNC: NEGATIVE MG/DL
HGB UR QL STRIP: NEGATIVE
KETONES UR STRIP-MCNC: NEGATIVE MG/DL
LEUKOCYTE ESTERASE UR QL STRIP: NEGATIVE
NITRATE UR QL: NEGATIVE
PH UR STRIP: 5 PH (ref 5–7)
SOURCE: NORMAL
SP GR UR STRIP: 1.02 (ref 1–1.03)
UROBILINOGEN UR STRIP-MCNC: 0 MG/DL (ref 0–2)

## 2020-05-20 PROCEDURE — 81003 URINALYSIS AUTO W/O SCOPE: CPT | Performed by: PHYSICIAN ASSISTANT

## 2020-05-20 NOTE — ADDENDUM NOTE
Addendum  created 05/20/20 0611 by Brandon, Sharmaine Plunkett MD    Clinical Note Signed, Intraprocedure Blocks edited

## 2020-05-20 NOTE — ADDENDUM NOTE
Addendum  created 05/20/20 0617 by Brandon, Sharmaine Plunkett MD    Clinical Note Signed, Intraprocedure Blocks edited

## 2020-05-21 ENCOUNTER — TELEPHONE (OUTPATIENT)
Dept: FAMILY MEDICINE | Facility: OTHER | Age: 65
End: 2020-05-21

## 2020-05-21 NOTE — TELEPHONE ENCOUNTER
Left message for patient to return call to clinic. When call is returned please relay normal results.     Monster Lobato,       Notes recorded by Noah Rangel PA-C on 5/20/2020 at 1:14 PM CDT   Please advise the patient of negative findings with respect to the U/A.   Electronically signed:     Noah Rangel PA-C

## 2020-05-21 NOTE — LETTER
May 22, 2020      Kumar Payne  [address] [place] [id number]        Dear ,    We are writing to inform you of your test results.    Your urine lab results were negative.     If you have any questions or concerns, please call the clinic at the number listed above.       Sincerely,        Noah Michaud PA-C

## 2020-05-27 ENCOUNTER — HOSPITAL ENCOUNTER (OUTPATIENT)
Dept: CARDIAC REHAB | Facility: CLINIC | Age: 65
End: 2020-05-27
Attending: SURGERY
Payer: COMMERCIAL

## 2020-05-27 PROCEDURE — 93798 PHYS/QHP OP CAR RHAB W/ECG: CPT

## 2020-05-27 PROCEDURE — 40000116 ZZH STATISTIC OP CR VISIT

## 2020-06-03 ENCOUNTER — HOSPITAL ENCOUNTER (OUTPATIENT)
Dept: CARDIAC REHAB | Facility: CLINIC | Age: 65
End: 2020-06-03
Attending: SURGERY
Payer: COMMERCIAL

## 2020-06-03 PROCEDURE — 93798 PHYS/QHP OP CAR RHAB W/ECG: CPT | Performed by: REHABILITATION PRACTITIONER

## 2020-06-03 PROCEDURE — 40000116 ZZH STATISTIC OP CR VISIT: Performed by: REHABILITATION PRACTITIONER

## 2020-06-10 ENCOUNTER — HOSPITAL ENCOUNTER (OUTPATIENT)
Dept: CARDIAC REHAB | Facility: CLINIC | Age: 65
End: 2020-06-10
Attending: SURGERY
Payer: COMMERCIAL

## 2020-06-10 PROCEDURE — 40000116 ZZH STATISTIC OP CR VISIT

## 2020-06-10 PROCEDURE — 93798 PHYS/QHP OP CAR RHAB W/ECG: CPT

## 2020-06-17 ENCOUNTER — TELEPHONE (OUTPATIENT)
Dept: OTHER | Facility: CLINIC | Age: 65
End: 2020-06-17

## 2020-06-17 ENCOUNTER — HOSPITAL ENCOUNTER (OUTPATIENT)
Dept: CARDIAC REHAB | Facility: CLINIC | Age: 65
End: 2020-06-17
Attending: SURGERY
Payer: COMMERCIAL

## 2020-06-17 PROCEDURE — 40000116 ZZH STATISTIC OP CR VISIT: Performed by: REHABILITATION PRACTITIONER

## 2020-06-17 PROCEDURE — 93798 PHYS/QHP OP CAR RHAB W/ECG: CPT | Performed by: REHABILITATION PRACTITIONER

## 2020-06-17 NOTE — TELEPHONE ENCOUNTER
Cardiac Rehabilitation called stating patient is having orthostatic BP drop. Baseline BP  with HR 89. BP drops to 82/64 with no HR change. Currently on Metoprolol 25 mg po BID and ASA. Amiodarone started post op for Atrial fibrillation but has been stopped. Discussed with Michaela Cullen PA-C.Will decrease Metoprolol to 12.5 mg po BID and continue to monitor. Cardiology follow up 7/8.

## 2020-06-24 ENCOUNTER — HOSPITAL ENCOUNTER (OUTPATIENT)
Dept: CARDIAC REHAB | Facility: CLINIC | Age: 65
End: 2020-06-24
Attending: SURGERY
Payer: COMMERCIAL

## 2020-06-24 PROCEDURE — 93798 PHYS/QHP OP CAR RHAB W/ECG: CPT

## 2020-06-24 PROCEDURE — 40000116 ZZH STATISTIC OP CR VISIT

## 2020-07-01 ENCOUNTER — HOSPITAL ENCOUNTER (OUTPATIENT)
Dept: CARDIAC REHAB | Facility: CLINIC | Age: 65
End: 2020-07-01
Attending: SURGERY
Payer: COMMERCIAL

## 2020-07-01 PROCEDURE — 93798 PHYS/QHP OP CAR RHAB W/ECG: CPT | Performed by: REHABILITATION PRACTITIONER

## 2020-07-01 PROCEDURE — 40000116 ZZH STATISTIC OP CR VISIT: Performed by: REHABILITATION PRACTITIONER

## 2020-07-08 ENCOUNTER — VIRTUAL VISIT (OUTPATIENT)
Dept: CARDIOLOGY | Facility: CLINIC | Age: 65
End: 2020-07-08
Payer: COMMERCIAL

## 2020-07-08 ENCOUNTER — HOSPITAL ENCOUNTER (OUTPATIENT)
Dept: CARDIAC REHAB | Facility: CLINIC | Age: 65
End: 2020-07-08
Attending: SURGERY
Payer: COMMERCIAL

## 2020-07-08 DIAGNOSIS — Z98.890 S/P MITRAL VALVE REPAIR: Primary | ICD-10-CM

## 2020-07-08 DIAGNOSIS — Z98.890 S/P MVR (MITRAL VALVE REPAIR): ICD-10-CM

## 2020-07-08 PROCEDURE — 40000116 ZZH STATISTIC OP CR VISIT

## 2020-07-08 PROCEDURE — 99213 OFFICE O/P EST LOW 20 MIN: CPT | Mod: 95 | Performed by: INTERNAL MEDICINE

## 2020-07-08 PROCEDURE — 93798 PHYS/QHP OP CAR RHAB W/ECG: CPT

## 2020-07-08 RX ORDER — METOPROLOL SUCCINATE 25 MG/1
12.5 TABLET, EXTENDED RELEASE ORAL DAILY
Qty: 30 TABLET | Refills: 11 | Status: SHIPPED | OUTPATIENT
Start: 2020-07-08 | End: 2021-07-14

## 2020-07-08 NOTE — LETTER
7/8/2020    Noah Michaud PA-C  [address] [facility] [address]    RE: Kumar Rodneyurley       Dear Colleague,    I had the pleasure of seeing Kumar Payne in the [facility].    Cardiology Clinic Visit  Date of Service:  July 8, 2020    PRIMARY CARE PHYSICIAN:  Noah Michaud    HISTORY OF PRESENT ILLNESS:  Mr. Payne is a 65 y/o gentleman with PMH significant for severe mitral regurgitation s/p  mitral valve repair with [place] Manuel NeoChords to the flail P2 segment, implantation of a 34 mm Leung Physio II annuloplasty ring on 5/4/2020.  Due to the COVID 19 pandemic, this visit is conducted vial telephone with Mr. Payne's consent.  In brief review, he underwent mitral valve repair as outlined above on 5/4/2020.  Post operative course was notable for atrial fibrillation and was treated briefly with amiodarone.  He had mild volume overload and was diuresed.  Since discharge he has been feeling well overall.   He is attending cardiac rehab.  He has experienced symptomatic orthostasis and his metoprolol was reduced.  He states this has helped but symptoms are still present.  He denies chest pain, shortness of breath.  He denies orthopnea, PND or lower extremity edema.  He is otherwise without cardiovascular complaints.         PAST MEDICAL HISTORY:  Past Medical History:   Diagnosis Date     Bipolar 1 disorder (H)      Bipolar disorder (H)     on Depakote     Mitral valve regurgitation        MEDICATIONS:  Current Outpatient Medications   Medication     acetaminophen (TYLENOL) 325 MG tablet     aspirin (ASA) 325 MG EC tablet     divalproex sodium extended-release (DEPAKOTE ER) 500 MG 24 hr tablet     metoprolol succinate ER (TOPROL-XL) 25 MG 24 hr tablet     amiodarone (PACERONE) 200 MG tablet     No current facility-administered medications for this visit.        ALLERGIES:  No Known Allergies    SOCIAL HISTORY:  I have reviewed this patient's social history and updated it with  pertinent information if needed. Kumar Payne  reports that he has never smoked. He has never used smokeless tobacco. He reports current alcohol use. He reports that he does not use drugs.    FAMILY HISTORY:  I have reviewed this patient's family history and updated it with pertinent information if needed.   No family history on file.    REVIEW OF SYSTEMS:  A complete ROS was obtained and the pertinent positives are outlined in the history of present illness above.  The remainder of systems is negative.      PHYSICAL EXAM:  Blood pressure: Cardiac rehab on 7/1 104/66  Pulse: 84  Weight: 215 #          ASSESSMENT:  1.  S/P Mitral valve repair with 34 mm Leung annuloplasty ring  2.  Post operative atrial fibrillation:  Resolved  3.  Orthostatic symptoms    RECOMMENDATIONS:  1. Reduce metoprolol XL to 12.5 mg daily  2. Echocardiogram for baseline postoperative study  3. Plan for for follow up in 3 months or before than as necessary    Sonia Kim MD  Cardiology - [facility]  Pager:  [phone]  July 8, 2020      Total telephone time:  14 minutes Total e-provider time:  30 minutes      Thank you for allowing me to participate in the care of your patient.    Sincerely,     Sonia Kim MD     [facility]

## 2020-07-08 NOTE — PROGRESS NOTES
"  The patient has been notified of following:     \"This telephone visit will be conducted via a call between you and your physician/provider. We have found that certain health care needs can be provided without the need for a physical exam.  This service lets us provide the care you need with a short phone conversation.  If a prescription is necessary we can send it directly to your pharmacy.  If lab work is needed we can place an order for that and you can then stop by our lab to have the test done at a later time.    Telephone visits are billed at different rates depending on your insurance coverage. During this emergency period, for some insurers they may be billed the same as an in-person visit.  Please reach out to your insurance provider with any questions.    If during the course of the call the physician/provider feels a telephone visit is not appropriate, you will not be charged for this service.\"    Patient has given verbal consent for Telephone visit?  Yes    What phone number would you like to be contacted at? [phone]    How would you like to obtain your AVS? Mail a copy      Cardiology Clinic Visit  Date of Service:  July 8, 2020    PRIMARY CARE PHYSICIAN:  Noah Michaud    HISTORY OF PRESENT ILLNESS:  Mr. Payne is a 63 y/o gentleman with PMH significant for severe mitral regurgitation s/p  mitral valve repair with [place]-Juan Manuel NeoChords to the flail P2 segment, implantation of a 34 mm Leung Physio II annuloplasty ring on 5/4/2020.  Due to the COVID 19 pandemic, this visit is conducted vial telephone with Mr. Payne's consent.  In brief review, he underwent mitral valve repair as outlined above on 5/4/2020.  Post operative course was notable for atrial fibrillation and was treated briefly with amiodarone.  He had mild volume overload and was diuresed.  Since discharge he has been feeling well overall.   He is attending cardiac rehab.  He has experienced symptomatic orthostasis and his metoprolol " was reduced.  He states this has helped but symptoms are still present.  He denies chest pain, shortness of breath.  He denies orthopnea, PND or lower extremity edema.  He is otherwise without cardiovascular complaints.         PAST MEDICAL HISTORY:  Past Medical History:   Diagnosis Date     Bipolar 1 disorder (H)      Bipolar disorder (H)     on Depakote     Mitral valve regurgitation        MEDICATIONS:  Current Outpatient Medications   Medication     acetaminophen (TYLENOL) 325 MG tablet     aspirin (ASA) 325 MG EC tablet     divalproex sodium extended-release (DEPAKOTE ER) 500 MG 24 hr tablet     metoprolol succinate ER (TOPROL-XL) 25 MG 24 hr tablet     amiodarone (PACERONE) 200 MG tablet     No current facility-administered medications for this visit.        ALLERGIES:  No Known Allergies    SOCIAL HISTORY:  I have reviewed this patient's social history and updated it with pertinent information if needed. Kumar Payne  reports that he has never smoked. He has never used smokeless tobacco. He reports current alcohol use. He reports that he does not use drugs.    FAMILY HISTORY:  I have reviewed this patient's family history and updated it with pertinent information if needed.   No family history on file.    REVIEW OF SYSTEMS:  A complete ROS was obtained and the pertinent positives are outlined in the history of present illness above.  The remainder of systems is negative.      PHYSICAL EXAM:  Blood pressure: Cardiac rehab on 7/1 104/66  Pulse: 84  Weight: 215 #          ASSESSMENT:  1.  S/P Mitral valve repair with 34 mm Leung annuloplasty ring  2.  Post operative atrial fibrillation:  Resolved  3.  Orthostatic symptoms    RECOMMENDATIONS:  1. Reduce metoprolol XL to 12.5 mg daily  2. Echocardiogram for baseline postoperative study  3. Plan for for follow up in 3 months or before than as necessary    Sonia Kim MD  Cardiology - [facility]  Pager:  [phone]  July 8, 2020      Total telephone time:   14 minutes  Total e-provider time:  30 minutes

## 2020-07-09 ENCOUNTER — DOCUMENTATION ONLY (OUTPATIENT)
Dept: CARDIOLOGY | Facility: CLINIC | Age: 65
End: 2020-07-09

## 2020-07-10 ENCOUNTER — HOSPITAL ENCOUNTER (OUTPATIENT)
Dept: CARDIOLOGY | Facility: CLINIC | Age: 65
Discharge: HOME | End: 2020-07-10
Attending: INTERNAL MEDICINE | Admitting: INTERNAL MEDICINE
Payer: COMMERCIAL

## 2020-07-10 DIAGNOSIS — Z98.890 S/P MITRAL VALVE REPAIR: ICD-10-CM

## 2020-07-10 PROCEDURE — 93306 TTE W/DOPPLER COMPLETE: CPT

## 2020-07-10 PROCEDURE — 93306 TTE W/DOPPLER COMPLETE: CPT | Mod: 26 | Performed by: INTERNAL MEDICINE

## 2020-07-15 ENCOUNTER — HOSPITAL ENCOUNTER (OUTPATIENT)
Dept: CARDIAC REHAB | Facility: CLINIC | Age: 65
End: 2020-07-15
Attending: SURGERY
Payer: COMMERCIAL

## 2020-07-15 PROCEDURE — 93798 PHYS/QHP OP CAR RHAB W/ECG: CPT | Performed by: REHABILITATION PRACTITIONER

## 2020-07-15 PROCEDURE — 40000116 ZZH STATISTIC OP CR VISIT: Performed by: REHABILITATION PRACTITIONER

## 2020-07-20 ENCOUNTER — TELEPHONE (OUTPATIENT)
Dept: CARDIOLOGY | Facility: CLINIC | Age: 65
End: 2020-07-20

## 2020-07-20 NOTE — TELEPHONE ENCOUNTER
RN called patient and left detailed  with ECHO results and Dr. Kim's recommendations. RN advised patient in Vm to call clinic with any questions.     ----- Message from Sonia Kim MD sent at 7/16/2020 10:14 PM CDT -----  Please let Kumar know that his echocardiogram demonstrates normal heart function.  The mitral valve repair looks good with no significant regurgitation.    Sonia Kim MD

## 2020-07-22 ENCOUNTER — HOSPITAL ENCOUNTER (OUTPATIENT)
Dept: CARDIAC REHAB | Facility: CLINIC | Age: 65
End: 2020-07-22
Attending: SURGERY
Payer: COMMERCIAL

## 2020-07-22 PROCEDURE — 40000116 ZZH STATISTIC OP CR VISIT

## 2020-07-22 PROCEDURE — 93798 PHYS/QHP OP CAR RHAB W/ECG: CPT

## 2020-07-29 ENCOUNTER — HOSPITAL ENCOUNTER (OUTPATIENT)
Dept: CARDIAC REHAB | Facility: CLINIC | Age: 65
End: 2020-07-29
Attending: SURGERY
Payer: COMMERCIAL

## 2020-07-29 PROCEDURE — 40000116 ZZH STATISTIC OP CR VISIT: Performed by: REHABILITATION PRACTITIONER

## 2020-07-29 PROCEDURE — 93798 PHYS/QHP OP CAR RHAB W/ECG: CPT | Performed by: REHABILITATION PRACTITIONER

## 2020-09-09 NOTE — PROGRESS NOTES
Subjective     Kumar Payne is a 64 year old male who presents to clinic today for the following health issues:    HPI       Eye(s) Problem  Onset/Duration: 1 month   Description:   Location: YES- both eyes   Pain: no  Redness: no  Accompanying Signs & Symptoms:  Discharge/mattering: no  Swelling: no  Visual changes: YES  Fever: no  Nasal Congestion: no  Bothered by bright lights: no  History:  Trauma: no  Foreign body exposure: no  Wearing contacts: no  Precipitating or alleviating factors: None  Therapies tried and outcome: None      Review of Systems   Constitutional, HEENT, cardiovascular, pulmonary, GI, , musculoskeletal, neuro, skin, endocrine and psych systems are negative, except as otherwise noted.      Objective    /68   Pulse 81   Temp 96.5  F (35.8  C) (Temporal)   Resp 18   Wt 99.1 kg (218 lb 8 oz)   SpO2 99%   BMI 30.47 kg/m    Body mass index is 30.47 kg/m .  Physical Exam   GENERAL: healthy, alert and no distress  HEENT:    Head normocephalic, hair normal, skin normal.    Eyes: JOSE RAMON/EOM, early cataract suspected  Ears:  Bilateral EAC's clear, TM's gray with bony landmarks and light reflex normal, negative effusion.  Nose: Bilateral nares patent, tissue pink no discharge noted.  Throat:  oral pharynx with good hydration, negative for tonsillar hypertrophy, negative pitting, ulceration or exudates.  NECK: no adenopathy, no asymmetry, masses, or scars and thyroid normal to palpation  RESP: lungs clear to auscultation - no rales, rhonchi or wheezes  CV: regular rate and rhythm, normal S1 S2, no S3 or S4, no murmur, click or rub, no peripheral edema and peripheral pulses strong  ABDOMEN: soft, nontender, no hepatosplenomegaly, no masses and bowel sounds normal  MS: no gross musculoskeletal defects noted, no edema    No results found for this or any previous visit (from the past 24 hour(s)).        Assessment & Plan     1. Double vision with both eyes open  Etiology of this is uncertain at  "this point time.  Would have him start with an eye exam since he has not had one in quite some time.  May need to see neurology for this.  May need MRI of his brain as well.  We will leave that up to the consultants.  - Lipid panel reflex to direct LDL Fasting  - Comprehensive metabolic panel  - EYE ADULT REFERRAL; Future    2. Need for prophylactic vaccination and inoculation against influenza  3. S/P MVR (mitral valve repair)  4. Bipolar disorder in partial remission, most recent episode unspecified type (H)  5. Hyperlipidemia LDL goal <100  6. Encounter for screening for cardiovascular disorders   Further evaluation based on results.  - Lipid panel reflex to direct LDL Fasting       BMI:   Estimated body mass index is 30.47 kg/m  as calculated from the following:    Height as of 5/4/20: 1.803 m (5' 11\").    Weight as of this encounter: 99.1 kg (218 lb 8 oz).   Weight management plan: Patient was referred to their PCP to discuss a diet and exercise plan.    Return in about 4 weeks (around 10/8/2020) for recheck of current condition, if symptoms do not improve.    Noah Michaud PA-C  [facility]    "

## 2020-09-10 ENCOUNTER — OFFICE VISIT (OUTPATIENT)
Dept: FAMILY MEDICINE | Facility: OTHER | Age: 65
End: 2020-09-10
Payer: COMMERCIAL

## 2020-09-10 VITALS
OXYGEN SATURATION: 99 % | WEIGHT: 218.5 LBS | SYSTOLIC BLOOD PRESSURE: 110 MMHG | RESPIRATION RATE: 18 BRPM | HEART RATE: 81 BPM | BODY MASS INDEX: 30.47 KG/M2 | TEMPERATURE: 96.5 F | DIASTOLIC BLOOD PRESSURE: 68 MMHG

## 2020-09-10 DIAGNOSIS — H53.2 DOUBLE VISION WITH BOTH EYES OPEN: ICD-10-CM

## 2020-09-10 DIAGNOSIS — F31.70 BIPOLAR DISORDER IN PARTIAL REMISSION, MOST RECENT EPISODE UNSPECIFIED TYPE (H): ICD-10-CM

## 2020-09-10 DIAGNOSIS — Z23 NEED FOR PROPHYLACTIC VACCINATION AND INOCULATION AGAINST INFLUENZA: Primary | ICD-10-CM

## 2020-09-10 DIAGNOSIS — E78.5 HYPERLIPIDEMIA LDL GOAL <100: ICD-10-CM

## 2020-09-10 DIAGNOSIS — Z98.890 S/P MVR (MITRAL VALVE REPAIR): ICD-10-CM

## 2020-09-10 DIAGNOSIS — Z13.6 ENCOUNTER FOR SCREENING FOR CARDIOVASCULAR DISORDERS: ICD-10-CM

## 2020-09-10 LAB
ALBUMIN SERPL-MCNC: 3.6 G/DL (ref 3.4–5)
ALP SERPL-CCNC: 109 U/L (ref 40–150)
ALT SERPL W P-5'-P-CCNC: 41 U/L (ref 0–70)
ANION GAP SERPL CALCULATED.3IONS-SCNC: 5 MMOL/L (ref 3–14)
AST SERPL W P-5'-P-CCNC: 27 U/L (ref 0–45)
BILIRUB SERPL-MCNC: 0.4 MG/DL (ref 0.2–1.3)
BUN SERPL-MCNC: 14 MG/DL (ref 7–30)
CALCIUM SERPL-MCNC: 8.9 MG/DL (ref 8.5–10.1)
CHLORIDE SERPL-SCNC: 108 MMOL/L (ref 94–109)
CHOLEST SERPL-MCNC: 155 MG/DL
CO2 SERPL-SCNC: 28 MMOL/L (ref 20–32)
CREAT SERPL-MCNC: 0.82 MG/DL (ref 0.66–1.25)
GFR SERPL CREATININE-BSD FRML MDRD: >90 ML/MIN/{1.73_M2}
GLUCOSE SERPL-MCNC: 80 MG/DL (ref 70–99)
HDLC SERPL-MCNC: 42 MG/DL
LDLC SERPL CALC-MCNC: 78 MG/DL
NONHDLC SERPL-MCNC: 113 MG/DL
POTASSIUM SERPL-SCNC: 4.1 MMOL/L (ref 3.4–5.3)
PROT SERPL-MCNC: 7 G/DL (ref 6.8–8.8)
SODIUM SERPL-SCNC: 141 MMOL/L (ref 133–144)
TRIGL SERPL-MCNC: 176 MG/DL

## 2020-09-10 PROCEDURE — 99214 OFFICE O/P EST MOD 30 MIN: CPT | Performed by: PHYSICIAN ASSISTANT

## 2020-09-10 PROCEDURE — 36415 COLL VENOUS BLD VENIPUNCTURE: CPT | Performed by: PHYSICIAN ASSISTANT

## 2020-09-10 PROCEDURE — 80053 COMPREHEN METABOLIC PANEL: CPT | Performed by: PHYSICIAN ASSISTANT

## 2020-09-10 PROCEDURE — 80061 LIPID PANEL: CPT | Performed by: PHYSICIAN ASSISTANT

## 2020-09-10 ASSESSMENT — PAIN SCALES - GENERAL: PAINLEVEL: NO PAIN (0)

## 2020-10-07 ENCOUNTER — OFFICE VISIT (OUTPATIENT)
Dept: CARDIOLOGY | Facility: CLINIC | Age: 65
End: 2020-10-07
Attending: INTERNAL MEDICINE
Payer: COMMERCIAL

## 2020-10-07 VITALS
BODY MASS INDEX: 31.33 KG/M2 | DIASTOLIC BLOOD PRESSURE: 66 MMHG | OXYGEN SATURATION: 98 % | SYSTOLIC BLOOD PRESSURE: 102 MMHG | HEART RATE: 80 BPM | WEIGHT: 223.8 LBS | HEIGHT: 71 IN

## 2020-10-07 DIAGNOSIS — Z98.890 S/P MITRAL VALVE REPAIR: ICD-10-CM

## 2020-10-07 PROCEDURE — 99214 OFFICE O/P EST MOD 30 MIN: CPT | Performed by: INTERNAL MEDICINE

## 2020-10-07 ASSESSMENT — MIFFLIN-ST. JEOR: SCORE: 1812.28

## 2020-10-07 NOTE — PROGRESS NOTES
CARDIOLOGY CLINIC VISIT  DATE OF SERVICE:  October 7, 2020    PRIMARY CARE PHYSICIAN:  Noah Michaud    HISTORY OF PRESENT ILLNESS:  Mr. Payne is a 66 y/o gentleman with PMH significant for severe mitral regurgitation s/p  mitral valve repair with [place]-Juan Manuel NeoChords to the flail P2 segment, implantation of a 34 mm Leung Physio II annuloplasty ring on 5/4/2020.   In brief review, he underwent mitral valve repair as outlined above on 5/4/2020.  Post operative course was notable for atrial fibrillation and was treated briefly with amiodarone.  He had mild volume overload and was diuresed.  Since discharge he has been feeling well overall.   He is attending cardiac rehab.  He has experienced symptomatic orthostasis and his metoprolol was reduced.  He states this has helped but symptoms are still present.  He denies chest pain, shortness of breath.  He denies orthopnea, PND or lower extremity edema.  He is otherwise without cardiovascular complaints.     In follow up today, he reports feeling well.  Since cutting back further on the metoprolol his dizziness has resolved.  He is still working as a -an active job.  He denies exertional chest pain, chest discomfort, shortness of breath.   An echocardiogram was performed which demonstrates normal LV function, normal functioning mitral valve annuloplasty ring with no mitral regurgitation.      PAST MEDICAL HISTORY:  1.  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  2.  Bipolar disorder:  On depakote    MEDICATIONS:  Current Outpatient Medications   Medication     divalproex sodium extended-release (DEPAKOTE ER) 500 MG 24 hr tablet     metoprolol succinate ER (TOPROL-XL) 25 MG 24 hr tablet     aspirin (ASA) 325 MG EC tablet     No current facility-administered medications for this visit.        ALLERGIES:  No Known Allergies    REVIEW OF SYSTEMS:  A complete ROS was obtained and the pertinent positives are  outlined in the history of present illness above.  The remainder of systems is negative.    PHYSICAL EXAM:      BP: 102/66 Pulse: 80     SpO2: 98 %      Vital Signs with Ranges  Pulse:  [80] 80  BP: (102)/(66) 102/66  SpO2:  [98 %] 98 %  223 lbs 12.8 oz    Constitutional: awake, alert, no distress  Eyes: PERRL, sclera nonicteric  ENT: trachea midline  Respiratory: CTAB  Cardiovascular: RRR no m/r/g, no JVD  GI: nondistended, nontender, bowel sounds present  Lymph/Hematologic: no lymphadenopathy  Skin: dry, no rash  Musculoskeletal: good muscle tone, no edema bilaterally  Neurologic: no focal deficits  Neuropsychiatric: appropriate affact    DATA:  Reviewed in EPIC        ASSESSMENT:  1.  S/P Mitral valve repair with 34 mm Leung annuloplasty ring; normal LV function with no residual MR by post-operative TTE  2.  Post operative atrial fibrillation:  Resolved  3.  Orthostatic symptoms:  Resolved with reduction of metoprolol.      RECOMMENDATIONS:  1. Continue metoprolol XL to 12.5 mg daily  2. Plan for follow up in 12 months with an echocardiogram prior to that visit.        Sonia Kim MD  Cardiology - [facility] [email or web address]] Heart  October 7, 2020

## 2020-10-07 NOTE — LETTER
10/7/2020    Noah Michaud PA-C  [address] [address] [facility] [address]    RE: Kumar Payne       Dear Colleague,    I had the pleasure of seeing Kumar Payne in the [facility].    CARDIOLOGY CLINIC VISIT  DATE OF SERVICE:  October 7, 2020    PRIMARY CARE PHYSICIAN:  Noah Michaud    HISTORY OF PRESENT ILLNESS:  Mr. Payne is a 66 y/o gentleman with PMH significant for severe mitral regurgitation s/p  mitral valve repair with [place]-Juan Manuel NeoChords to the flail P2 segment, implantation of a 34 mm Leung Physio II annuloplasty ring on 5/4/2020.   In brief review, he underwent mitral valve repair as outlined above on 5/4/2020.  Post operative course was notable for atrial fibrillation and was treated briefly with amiodarone.  He had mild volume overload and was diuresed.  Since discharge he has been feeling well overall.   He is attending cardiac rehab.  He has experienced symptomatic orthostasis and his metoprolol was reduced.  He states this has helped but symptoms are still present.  He denies chest pain, shortness of breath.  He denies orthopnea, PND or lower extremity edema.  He is otherwise without cardiovascular complaints.     In follow up today, he reports feeling well.  Since cutting back further on the metoprolol his dizziness has resolved.  He is still working as a -an active job.  He denies exertional chest pain, chest discomfort, shortness of breath.   An echocardiogram was performed which demonstrates normal LV function, normal functioning mitral valve annuloplasty ring with no mitral regurgitation.      PAST MEDICAL HISTORY:  1.  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  2.  Bipolar disorder:  On depakote    MEDICATIONS:  Current Outpatient Medications   Medication     divalproex sodium extended-release (DEPAKOTE ER) 500 MG 24 hr tablet     metoprolol succinate ER (TOPROL-XL) 25 MG 24 hr tablet     aspirin  (ASA) 325 MG EC tablet     No current facility-administered medications for this visit.        ALLERGIES:  No Known Allergies    REVIEW OF SYSTEMS:  A complete ROS was obtained and the pertinent positives are outlined in the history of present illness above.  The remainder of systems is negative.    PHYSICAL EXAM:      BP: 102/66 Pulse: 80     SpO2: 98 %      Vital Signs with Ranges  Pulse:  [80] 80  BP: (102)/(66) 102/66  SpO2:  [98 %] 98 %  223 lbs 12.8 oz    Constitutional: awake, alert, no distress  Eyes: PERRL, sclera nonicteric  ENT: trachea midline  Respiratory: CTAB  Cardiovascular: RRR no m/r/g, no JVD  GI: nondistended, nontender, bowel sounds present  Lymph/Hematologic: no lymphadenopathy  Skin: dry, no rash  Musculoskeletal: good muscle tone, no edema bilaterally  Neurologic: no focal deficits  Neuropsychiatric: appropriate affact    DATA:  Reviewed in EPIC        ASSESSMENT:  1.  S/P Mitral valve repair with 34 mm Leung annuloplasty ring; normal LV function with no residual MR by post-operative TTE  2.  Post operative atrial fibrillation:  Resolved  3.  Orthostatic symptoms:  Resolved with reduction of metoprolol.      RECOMMENDATIONS:  1. Continue metoprolol XL to 12.5 mg daily  2. Plan for follow up in 12 months with an echocardiogram prior to that visit.        Sonia Kim MD  Cardiology - [facility]  October 7, 2020      Thank you for allowing me to participate in the care of your patient.    Sincerely,     Sonia Kim MD     [facility]

## 2021-07-14 DIAGNOSIS — Z98.890 S/P MVR (MITRAL VALVE REPAIR): ICD-10-CM

## 2021-07-14 RX ORDER — METOPROLOL SUCCINATE 25 MG/1
12.5 TABLET, EXTENDED RELEASE ORAL DAILY
Qty: 30 TABLET | Refills: 11 | Status: SHIPPED | OUTPATIENT
Start: 2021-07-14 | End: 2021-08-25

## 2021-07-30 ENCOUNTER — HOSPITAL ENCOUNTER (OUTPATIENT)
Dept: CARDIOLOGY | Facility: CLINIC | Age: 66
Discharge: HOME | End: 2021-07-30
Attending: INTERNAL MEDICINE | Admitting: INTERNAL MEDICINE
Payer: MEDICARE

## 2021-07-30 DIAGNOSIS — Z98.890 S/P MITRAL VALVE REPAIR: ICD-10-CM

## 2021-07-30 LAB — LVEF ECHO: NORMAL

## 2021-07-30 PROCEDURE — 93306 TTE W/DOPPLER COMPLETE: CPT | Mod: 26 | Performed by: INTERNAL MEDICINE

## 2021-07-30 PROCEDURE — 93306 TTE W/DOPPLER COMPLETE: CPT

## 2021-08-24 NOTE — PROGRESS NOTES
CARDIOLOGY CLINIC VISIT  DATE OF SERVICE: August 25, 201    PRIMARY CARE PHYSICIAN:  Noah Michaud    HISTORY OF PRESENT ILLNESS:  Mr. Payne is a 66 y/o gentleman with PMH significant for severe mitral regurgitation s/p  mitral valve repair with [place]-Juan Manuel NeoChords to the flail P2 segment, implantation of a 34 mm Leung Physio II annuloplasty ring on 5/4/2020. He was last seen by me in 10/2020.    In brief review, he underwent mitral valve repair as outlined above on 5/4/2020.  Post operative course was notable for atrial fibrillation and was treated briefly with amiodarone.  He had mild volume overload and was diuresed.    In follow up today, Kumar reports feeling well.  He denies any exertional chest pain, chest discomfort or shortness of breath.  He feels his orthostatic symptoms are better on a lower dose of metoprolol but wonders if he really needs to beo n it at all.  He feels well and is entirely without cardiovascular complaints.    PAST MEDICAL HISTORY:  1.  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  2.  Bipolar disorder:  On depakote    MEDICATIONS:  Current Outpatient Medications   Medication     aspirin (ASA) 325 MG EC tablet     divalproex sodium extended-release (DEPAKOTE ER) 500 MG 24 hr tablet     metoprolol succinate ER (TOPROL-XL) 25 MG 24 hr tablet     No current facility-administered medications for this visit.       ALLERGIES:  No Known Allergies    REVIEW OF SYSTEMS:  A complete ROS was obtained and the pertinent positives are outlined in the history of present illness above.  The remainder of systems is negative.    PHYSICAL EXAM:                     Vital Signs with Ranges     0 lbs 0 oz    Constitutional: awake, alert, no distress  Eyes: PERRL, sclera nonicteric  ENT: trachea midline  Respiratory: CTAB  Cardiovascular: RRR no m/r/g, no JVD  GI: nondistended, nontender, bowel sounds present  Lymph/Hematologic: no lymphadenopathy  Skin:  dry, no rash  Musculoskeletal: good muscle tone, no edema bilaterally  Neurologic: no focal deficits  Neuropsychiatric: appropriate affact    DATA:  Reviewed in EPIC      Echocardiogram dated 7/30/21 images reviewed personally  Left ventricular systolic function is normal.  The visual ejection fraction is 55-60%.  An annuloplasty ring is noted in the mitral position.  There is no mitral regurgitation noted.  There is significant calcification and small fibrinous strand like  echogenecity to chord in area of calcification. This is noted on previous  echoes as well (unchanged)  _______________________________________________________________      ASSESSMENT:  1.  S/P Mitral valve repair with 34 mm Leung annuloplasty ring; normal LV function with no residual MR by recent echocardiogram.    2.  Post operative atrial fibrillation:  Resolved.  No symptomatic recurrence  3.  Orthostatic symptoms:  Improved on low dose metoprolol.    RECOMMENDATIONS:  -will stop metoprolol.  There has been no afib recurrence and BP's remain somewhat soft  -plan for follow up in 12 months or before than as necessary      Sonia Kim MD  Cardiology - [facility] Heart  August 25, 2021

## 2021-08-25 ENCOUNTER — OFFICE VISIT (OUTPATIENT)
Dept: CARDIOLOGY | Facility: CLINIC | Age: 66
End: 2021-08-25
Attending: INTERNAL MEDICINE
Payer: COMMERCIAL

## 2021-08-25 VITALS
HEART RATE: 76 BPM | BODY MASS INDEX: 33.11 KG/M2 | SYSTOLIC BLOOD PRESSURE: 108 MMHG | DIASTOLIC BLOOD PRESSURE: 62 MMHG | HEIGHT: 71 IN | WEIGHT: 236.5 LBS | OXYGEN SATURATION: 100 %

## 2021-08-25 DIAGNOSIS — Z98.890 S/P MITRAL VALVE REPAIR: ICD-10-CM

## 2021-08-25 PROCEDURE — 99214 OFFICE O/P EST MOD 30 MIN: CPT | Performed by: INTERNAL MEDICINE

## 2021-08-25 RX ORDER — PRAMIPEXOLE DIHYDROCHLORIDE 0.12 MG/1
TABLET ORAL
COMMUNITY
Start: 2021-07-12 | End: 2022-10-05

## 2021-08-25 ASSESSMENT — MIFFLIN-ST. JEOR: SCORE: 1869.89

## 2021-08-25 NOTE — LETTER
8/25/2021    Noah Michaud PA-C  [address] [address] [facility] [address]    RE: Kumar Payne       Dear Colleague,    I had the pleasure of seeing Kumar Payne in the [facility].    CARDIOLOGY CLINIC VISIT  DATE OF SERVICE: August 25, 201    PRIMARY CARE PHYSICIAN:  Noah Michaud    HISTORY OF PRESENT ILLNESS:  Mr. Payne is a 66 y/o gentleman with PMH significant for severe mitral regurgitation s/p  mitral valve repair with [place]-Juan Manuel NeoChords to the flail P2 segment, implantation of a 34 mm Leung Physio II annuloplasty ring on 5/4/2020. He was last seen by me in 10/2020.    In brief review, he underwent mitral valve repair as outlined above on 5/4/2020.  Post operative course was notable for atrial fibrillation and was treated briefly with amiodarone.  He had mild volume overload and was diuresed.    In follow up today, Kumar reports feeling well.  He denies any exertional chest pain, chest discomfort or shortness of breath.  He feels his orthostatic symptoms are better on a lower dose of metoprolol but wonders if he really needs to beo n it at all.  He feels well and is entirely without cardiovascular complaints.    PAST MEDICAL HISTORY:  1.  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  2.  Bipolar disorder:  On depakote    MEDICATIONS:  Current Outpatient Medications   Medication     aspirin (ASA) 325 MG EC tablet     divalproex sodium extended-release (DEPAKOTE ER) 500 MG 24 hr tablet     metoprolol succinate ER (TOPROL-XL) 25 MG 24 hr tablet     No current facility-administered medications for this visit.       ALLERGIES:  No Known Allergies    REVIEW OF SYSTEMS:  A complete ROS was obtained and the pertinent positives are outlined in the history of present illness above.  The remainder of systems is negative.    PHYSICAL EXAM:                     Vital Signs with Ranges     0 lbs 0  oz    Constitutional: awake, alert, no distress  Eyes: PERRL, sclera nonicteric  ENT: trachea midline  Respiratory: CTAB  Cardiovascular: RRR no m/r/g, no JVD  GI: nondistended, nontender, bowel sounds present  Lymph/Hematologic: no lymphadenopathy  Skin: dry, no rash  Musculoskeletal: good muscle tone, no edema bilaterally  Neurologic: no focal deficits  Neuropsychiatric: appropriate affact    DATA:  Reviewed in EPIC      Echocardiogram dated 7/30/21 images reviewed personally  Left ventricular systolic function is normal.  The visual ejection fraction is 55-60%.  An annuloplasty ring is noted in the mitral position.  There is no mitral regurgitation noted.  There is significant calcification and small fibrinous strand like  echogenecity to chord in area of calcification. This is noted on previous  echoes as well (unchanged)  _______________________________________________________________      ASSESSMENT:  1.  S/P Mitral valve repair with 34 mm Leung annuloplasty ring; normal LV function with no residual MR by recent echocardiogram.    2.  Post operative atrial fibrillation:  Resolved.  No symptomatic recurrence  3.  Orthostatic symptoms:  Improved on low dose metoprolol.    RECOMMENDATIONS:  -will stop metoprolol.  There has been no afib recurrence and BP's remain somewhat soft  -plan for follow up in 12 months or before than as necessary      Sonia Kim MD  Cardiology - [facility]  August 25, 2021      Thank you for allowing me to participate in the care of your patient.      Sincerely,     Sonia Kim MD     [facility]  cc:   Sonia Kim MD  [facility] AT [place]  [address]  [place] [id number]

## 2022-10-05 ENCOUNTER — OFFICE VISIT (OUTPATIENT)
Dept: CARDIOLOGY | Facility: CLINIC | Age: 67
End: 2022-10-05
Payer: COMMERCIAL

## 2022-10-05 VITALS
SYSTOLIC BLOOD PRESSURE: 106 MMHG | DIASTOLIC BLOOD PRESSURE: 70 MMHG | WEIGHT: 239.8 LBS | HEART RATE: 74 BPM | BODY MASS INDEX: 33.57 KG/M2 | HEIGHT: 71 IN | OXYGEN SATURATION: 98 %

## 2022-10-05 DIAGNOSIS — Z98.890 S/P MITRAL VALVE REPAIR: Primary | ICD-10-CM

## 2022-10-05 PROCEDURE — 99213 OFFICE O/P EST LOW 20 MIN: CPT | Performed by: INTERNAL MEDICINE

## 2022-10-05 RX ORDER — DONEPEZIL HYDROCHLORIDE 10 MG/1
5 TABLET, FILM COATED ORAL
COMMUNITY
Start: 2022-06-27 | End: 2023-10-04

## 2022-10-05 NOTE — PROGRESS NOTES
CARDIOLOGY CLINIC VISIT  DATE OF SERVICE: October 5, 2022    PRIMARY CARE PHYSICIAN:  Noah Michaud    HISTORY OF PRESENT ILLNESS:  Mr. Payne is a 66 y/o gentleman with PMH significant for severe mitral regurgitation s/p  mitral valve repair with [place]-Juan Manuel NeoChords to the flail P2 segment, implantation of a 34 mm Leung Physio II annuloplasty ring on 5/4/2020. He was last seen by me in 8/2021.    In brief review, he underwent mitral valve repair as outlined above on 5/4/2020.  Post operative course was notable for atrial fibrillation and was treated briefly with amiodarone.  He had mild volume overload and was diuresed.    In follow up today, Kumar continues to feel well.  He is now off the BB and his orthostatic symptoms have improved significantly.  He denies any exertional chest pain, chest discomfort or shortness of breath.      PAST MEDICAL HISTORY:  1.  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  2.  Bipolar disorder:  On depakote    MEDICATIONS:  Current Outpatient Medications   Medication     donepezil (ARICEPT) 10 MG tablet     aspirin (ASA) 325 MG EC tablet     divalproex sodium extended-release (DEPAKOTE ER) 500 MG 24 hr tablet     No current facility-administered medications for this visit.       ALLERGIES:  No Known Allergies    REVIEW OF SYSTEMS:  A complete ROS was obtained and the pertinent positives are outlined in the history of present illness above.  The remainder of systems is negative.    PHYSICAL EXAM:                     Vital Signs with Ranges     239 lbs 12.8 oz    Constitutional: awake, alert, no distress  Eyes: PERRL, sclera nonicteric  ENT: trachea midline  Respiratory: CTAB  Cardiovascular: RRR no m/r/g, no JVD  GI: nondistended, nontender, bowel sounds present  Lymph/Hematologic: no lymphadenopathy  Skin: dry, no rash  Musculoskeletal: good muscle tone, no edema bilaterally  Neurologic: no focal deficits  Neuropsychiatric:  appropriate affact    DATA:  Reviewed in EPIC      Echocardiogram dated 7/30/21 images reviewed personally  Left ventricular systolic function is normal.  The visual ejection fraction is 55-60%.  An annuloplasty ring is noted in the mitral position.  There is no mitral regurgitation noted.  There is significant calcification and small fibrinous strand like  echogenecity to chord in area of calcification. This is noted on previous  echoes as well (unchanged)  _______________________________________________________________      ASSESSMENT:  1.  S/P Mitral valve repair with 34 mm Leung annuloplasty ring; normal LV function with no residual MR by  Echocardiogram in 2021.    2.  Post operative atrial fibrillation:  Resolved.  No symptomatic recurrence  3.  Orthostatic symptoms:  Improved off beta-blocker.      RECOMMENDATIONS:  -Doing well from a cardiac standpoint.   Plan for follow up in 12 months with an echocardiogram prior to that visit.      Sonia Kim MD [facility]

## 2022-10-05 NOTE — LETTER
10/5/2022    Noah Michaud PA-C  [address] [address] [facility] [address]    RE: Kumar Payne       Dear Colleague,     I had the pleasure of seeing Kumar Payne in the [facility].  CARDIOLOGY CLINIC VISIT  DATE OF SERVICE: October 5, 2022    PRIMARY CARE PHYSICIAN:  Noah Michaud    HISTORY OF PRESENT ILLNESS:  Mr. Payne is a 66 y/o gentleman with PMH significant for severe mitral regurgitation s/p  mitral valve repair with [place]-Juan Manuel NeoChords to the flail P2 segment, implantation of a 34 mm Leung Physio II annuloplasty ring on 5/4/2020. He was last seen by me in 8/2021.    In brief review, he underwent mitral valve repair as outlined above on 5/4/2020.  Post operative course was notable for atrial fibrillation and was treated briefly with amiodarone.  He had mild volume overload and was diuresed.    In follow up today, Kumar continues to feel well.  He is now off the BB and his orthostatic symptoms have improved significantly.  He denies any exertional chest pain, chest discomfort or shortness of breath.      PAST MEDICAL HISTORY:  1.  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  2.  Bipolar disorder:  On depakote    MEDICATIONS:  Current Outpatient Medications   Medication     donepezil (ARICEPT) 10 MG tablet     aspirin (ASA) 325 MG EC tablet     divalproex sodium extended-release (DEPAKOTE ER) 500 MG 24 hr tablet     No current facility-administered medications for this visit.       ALLERGIES:  No Known Allergies    REVIEW OF SYSTEMS:  A complete ROS was obtained and the pertinent positives are outlined in the history of present illness above.  The remainder of systems is negative.    PHYSICAL EXAM:                     Vital Signs with Ranges     239 lbs 12.8 oz    Constitutional: awake, alert, no distress  Eyes: PERRL, sclera nonicteric  ENT: trachea midline  Respiratory: CTAB  Cardiovascular: RRR no m/r/g, no JVD  GI: nondistended,  nontender, bowel sounds present  Lymph/Hematologic: no lymphadenopathy  Skin: dry, no rash  Musculoskeletal: good muscle tone, no edema bilaterally  Neurologic: no focal deficits  Neuropsychiatric: appropriate affact    DATA:  Reviewed in EPIC      Echocardiogram dated 7/30/21 images reviewed personally  Left ventricular systolic function is normal.  The visual ejection fraction is 55-60%.  An annuloplasty ring is noted in the mitral position.  There is no mitral regurgitation noted.  There is significant calcification and small fibrinous strand like  echogenecity to chord in area of calcification. This is noted on previous  echoes as well (unchanged)  _______________________________________________________________      ASSESSMENT:  1.  S/P Mitral valve repair with 34 mm Leung annuloplasty ring; normal LV function with no residual MR by  Echocardiogram in 2021.    2.  Post operative atrial fibrillation:  Resolved.  No symptomatic recurrence  3.  Orthostatic symptoms:  Improved off beta-blocker.      RECOMMENDATIONS:  -Doing well from a cardiac standpoint.   Plan for follow up in 12 months with an echocardiogram prior to that visit.      Sonia Kim MD [facility]      Thank you for allowing me to participate in the care of your patient.      Sincerely,     Sonia Kim MD     [facility]  cc:   No referring provider defined for this encounter.

## 2023-09-26 ENCOUNTER — HOSPITAL ENCOUNTER (OUTPATIENT)
Dept: CARDIOLOGY | Facility: CLINIC | Age: 68
Discharge: HOME | End: 2023-09-26
Attending: INTERNAL MEDICINE | Admitting: INTERNAL MEDICINE
Payer: MEDICARE

## 2023-09-26 DIAGNOSIS — Z98.890 S/P MITRAL VALVE REPAIR: ICD-10-CM

## 2023-09-26 LAB — LVEF ECHO: NORMAL

## 2023-09-26 PROCEDURE — 93306 TTE W/DOPPLER COMPLETE: CPT | Mod: 26 | Performed by: INTERNAL MEDICINE

## 2023-09-26 PROCEDURE — 93306 TTE W/DOPPLER COMPLETE: CPT

## 2023-10-04 ENCOUNTER — OFFICE VISIT (OUTPATIENT)
Dept: CARDIOLOGY | Facility: CLINIC | Age: 68
End: 2023-10-04
Payer: COMMERCIAL

## 2023-10-04 VITALS
SYSTOLIC BLOOD PRESSURE: 102 MMHG | BODY MASS INDEX: 32.76 KG/M2 | WEIGHT: 234 LBS | DIASTOLIC BLOOD PRESSURE: 68 MMHG | HEIGHT: 71 IN | HEART RATE: 86 BPM | OXYGEN SATURATION: 94 %

## 2023-10-04 DIAGNOSIS — Z98.890 S/P MITRAL VALVE REPAIR: ICD-10-CM

## 2023-10-04 PROCEDURE — 99214 OFFICE O/P EST MOD 30 MIN: CPT | Performed by: INTERNAL MEDICINE

## 2023-10-04 RX ORDER — SILDENAFIL 100 MG/1
100 TABLET, FILM COATED ORAL
COMMUNITY
Start: 2023-02-06

## 2023-10-04 NOTE — PROGRESS NOTES
CARDIOLOGY CLINIC VISIT  DATE OF SERVICE: October 4, 2023    PRIMARY CARE PHYSICIAN:  Noah Boland    HISTORY OF PRESENT ILLNESS:  Mr. Payne is a 67 y/o gentleman with PMH significant for severe mitral regurgitation s/p  mitral valve repair with [place]-Juan Manuel NeoChords to the flail P2 segment, implantation of a 34 mm Leung Physio II annuloplasty ring on 5/4/2020. He was last seen by me in 10/2022.  In brief review, he underwent mitral valve repair as outlined above on 5/4/2020.  Post operative course was notable for atrial fibrillation and was treated briefly with amiodarone.  He had mild volume overload and was diuresed.    Today, Kumar presents for follow up.  He retired last week as a .  He moved his mother into a memory care center just today and he has been supporting his daughter who is having some mental health challenges.  Despite these stressors, he is feeling well and is looking forward to group home.  He denies any chest pain, chest discomfort or shortness of breath.  He denies any orthopnea or PND.  He underwent an echocardiogram last week which I reviewed personally.  This demonstrates normal heart function with normal mitral valve repair and annuloplasty ring without significant regurgitation.      PAST MEDICAL HISTORY:  1.  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  2.  Bipolar disorder:  On depakote    MEDICATIONS:  Current Outpatient Medications   Medication    divalproex sodium extended-release (DEPAKOTE ER) 500 MG 24 hr tablet    aspirin (ASA) 325 MG EC tablet    sildenafil (VIAGRA) 100 MG tablet     No current facility-administered medications for this visit.       ALLERGIES:  No Known Allergies    REVIEW OF SYSTEMS:  A complete ROS was obtained and the pertinent positives are outlined in the history of present illness above.  The remainder of systems is negative.    PHYSICAL EXAM:      BP: 102/68 Pulse: 86     SpO2: 94 %      Vital  Signs with Ranges  Pulse:  [86] 86  BP: (102)/(68) 102/68  SpO2:  [94 %] 94 %  234 lbs 0 oz    Constitutional: awake, alert, no distress  Eyes: PERRL, sclera nonicteric  ENT: trachea midline  Respiratory: CTAB  Cardiovascular: RRR no m/r/g, no JVD  GI: nondistended, nontender, bowel sounds present  Lymph/Hematologic: no lymphadenopathy  Skin: dry, no rash  Musculoskeletal: good muscle tone, no edema bilaterally  Neurologic: no focal deficits  Neuropsychiatric: appropriate affact    DATA:  Reviewed in EPIC      Echocardiogram dated 9/26/23 mages reviewed personally  Left ventricular systolic function is normal.  The visual ejection fraction is 60-65%.  The right ventricle is normal in structure, function and size.  No significant valve dysfunction.  Mitral annuloplasty ring present. No significant MR.  Calcified mitral chord (unchanged from prior).  The inferior vena cava was normal in size with preserved respiratory  variability.  There is no pericardial effusion.  Aortic root is dilated at 4.0 cm. Normal sized ascending aorta.     Compared to prior study dated 7/30/2021, no major changes. Aortic root  previously measured 3.9 cm.      ASSESSMENT:  1.  S/P Mitral valve repair with 34 mm Leung annuloplasty ring; normal LV function with no residual MR by echocardiogram performed last week.    2.  Post operative atrial fibrillation:  Resolved.  No symptomatic recurrence  3.  Orthostatic symptoms:  Improved off beta-blocker.      RECOMMENDATIONS:  -Kumar is doing very well.  Now that he is retired, we discussed the importance of staying physically active each day (he was a ).  -Plan for follow up in one year with an echocardiogram prior to that visit.      Sonia Kim MD [facility]     spent a total of 30 minutes providing patient care.  This time includes chart review, reviewing echocardiogram images, time spent with the patient during the clinic visit and time spent afterwards  providing necessary documentation.

## 2023-10-04 NOTE — LETTER
10/4/2023    Noah Boland PA-C  [address] [facility] [address]    RE: Kumar Payne       Dear Colleague,     I had the pleasure of seeing Kumar Payne in the [facility].  CARDIOLOGY CLINIC VISIT  DATE OF SERVICE: October 4, 2023    PRIMARY CARE PHYSICIAN:  Noah Boland    HISTORY OF PRESENT ILLNESS:  Mr. Payne is a 69 y/o gentleman with PMH significant for severe mitral regurgitation s/p  mitral valve repair with [place] NeoChords to the flail P2 segment, implantation of a 34 mm Leung Physio II annuloplasty ring on 5/4/2020. He was last seen by me in 10/2022.  In brief review, he underwent mitral valve repair as outlined above on 5/4/2020.  Post operative course was notable for atrial fibrillation and was treated briefly with amiodarone.  He had mild volume overload and was diuresed.    Today, Kumar presents for follow up.  He retired last week as a .  He moved his mother into a memory care center just today and he has been supporting his daughter who is having some mental health challenges.  Despite these stressors, he is feeling well and is looking forward to [facility].  He denies any chest pain, chest discomfort or shortness of breath.  He denies any orthopnea or PND.  He underwent an echocardiogram last week which I reviewed personally.  This demonstrates normal heart function with normal mitral valve repair and annuloplasty ring without significant regurgitation.      PAST MEDICAL HISTORY:  1.  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  2.  Bipolar disorder:  On depakote    MEDICATIONS:  Current Outpatient Medications   Medication    divalproex sodium extended-release (DEPAKOTE ER) 500 MG 24 hr tablet    aspirin (ASA) 325 MG EC tablet    sildenafil (VIAGRA) 100 MG tablet     No current facility-administered medications for this visit.       ALLERGIES:  No Known Allergies    REVIEW OF SYSTEMS:  A complete ROS  was obtained and the pertinent positives are outlined in the history of present illness above.  The remainder of systems is negative.    PHYSICAL EXAM:      BP: 102/68 Pulse: 86     SpO2: 94 %      Vital Signs with Ranges  Pulse:  [86] 86  BP: (102)/(68) 102/68  SpO2:  [94 %] 94 %  234 lbs 0 oz    Constitutional: awake, alert, no distress  Eyes: PERRL, sclera nonicteric  ENT: trachea midline  Respiratory: CTAB  Cardiovascular: RRR no m/r/g, no JVD  GI: nondistended, nontender, bowel sounds present  Lymph/Hematologic: no lymphadenopathy  Skin: dry, no rash  Musculoskeletal: good muscle tone, no edema bilaterally  Neurologic: no focal deficits  Neuropsychiatric: appropriate affact    DATA:  Reviewed in EPIC      Echocardiogram dated 9/26/23 mages reviewed personally  Left ventricular systolic function is normal.  The visual ejection fraction is 60-65%.  The right ventricle is normal in structure, function and size.  No significant valve dysfunction.  Mitral annuloplasty ring present. No significant MR.  Calcified mitral chord (unchanged from prior).  The inferior vena cava was normal in size with preserved respiratory  variability.  There is no pericardial effusion.  Aortic root is dilated at 4.0 cm. Normal sized ascending aorta.     Compared to prior study dated 7/30/2021, no major changes. Aortic root  previously measured 3.9 cm.      ASSESSMENT:  1.  S/P Mitral valve repair with 34 mm Leung annuloplasty ring; normal LV function with no residual MR by echocardiogram performed last week.    2.  Post operative atrial fibrillation:  Resolved.  No symptomatic recurrence  3.  Orthostatic symptoms:  Improved off beta-blocker.      RECOMMENDATIONS:  -Kumar is doing very well.  Now that he is retired, we discussed the importance of staying physically active each day (he was a ).  -Plan for follow up in one year with an echocardiogram prior to that visit.      Sonia Kim MD [facility]    I  spent a total of 30 minutes providing patient care.  This time includes chart review, reviewing echocardiogram images, time spent with the patient during the clinic visit and time spent afterwards providing necessary documentation.        Thank you for allowing me to participate in the care of your patient.      Sincerely,     Sonia Kim MD     [facility]  cc:   Sonia Kim MD  [address] [address]

## 2024-05-08 ENCOUNTER — OFFICE VISIT (OUTPATIENT)
Dept: INTERNAL MEDICINE | Facility: CLINIC | Age: 69
End: 2024-05-08
Payer: COMMERCIAL

## 2024-05-08 ENCOUNTER — TELEPHONE (OUTPATIENT)
Dept: INTERNAL MEDICINE | Facility: CLINIC | Age: 69
End: 2024-05-08

## 2024-05-08 VITALS
TEMPERATURE: 96.9 F | OXYGEN SATURATION: 98 % | HEART RATE: 80 BPM | SYSTOLIC BLOOD PRESSURE: 118 MMHG | WEIGHT: 243 LBS | HEIGHT: 71 IN | BODY MASS INDEX: 34.02 KG/M2 | DIASTOLIC BLOOD PRESSURE: 80 MMHG | RESPIRATION RATE: 16 BRPM

## 2024-05-08 DIAGNOSIS — E78.5 HYPERLIPIDEMIA LDL GOAL <100: ICD-10-CM

## 2024-05-08 DIAGNOSIS — F31.70 BIPOLAR DISORDER IN PARTIAL REMISSION, MOST RECENT EPISODE UNSPECIFIED TYPE (H): ICD-10-CM

## 2024-05-08 DIAGNOSIS — Z98.890 S/P MVR (MITRAL VALVE REPAIR): ICD-10-CM

## 2024-05-08 DIAGNOSIS — Z00.00 MEDICARE ANNUAL WELLNESS VISIT, SUBSEQUENT: Primary | ICD-10-CM

## 2024-05-08 DIAGNOSIS — N52.9 ERECTILE DYSFUNCTION, UNSPECIFIED ERECTILE DYSFUNCTION TYPE: ICD-10-CM

## 2024-05-08 DIAGNOSIS — Z12.5 SCREENING FOR PROSTATE CANCER: ICD-10-CM

## 2024-05-08 PROBLEM — I48.91 A-FIB (H): Status: RESOLVED | Noted: 2020-05-09 | Resolved: 2024-05-08

## 2024-05-08 LAB
ALBUMIN SERPL BCG-MCNC: 4.2 G/DL (ref 3.5–5.2)
ALP SERPL-CCNC: 102 U/L (ref 40–150)
ALT SERPL W P-5'-P-CCNC: 36 U/L (ref 0–70)
ANION GAP SERPL CALCULATED.3IONS-SCNC: 12 MMOL/L (ref 7–15)
AST SERPL W P-5'-P-CCNC: 36 U/L (ref 0–45)
BILIRUB SERPL-MCNC: 0.5 MG/DL
BUN SERPL-MCNC: 11.3 MG/DL (ref 8–23)
CALCIUM SERPL-MCNC: 9.2 MG/DL (ref 8.8–10.2)
CHLORIDE SERPL-SCNC: 105 MMOL/L (ref 98–107)
CHOLEST SERPL-MCNC: 177 MG/DL
CREAT SERPL-MCNC: 0.92 MG/DL (ref 0.67–1.17)
DEPRECATED HCO3 PLAS-SCNC: 22 MMOL/L (ref 22–29)
EGFRCR SERPLBLD CKD-EPI 2021: 89 ML/MIN/1.73M2
ERYTHROCYTE [DISTWIDTH] IN BLOOD BY AUTOMATED COUNT: 12.6 % (ref 10–15)
FASTING STATUS PATIENT QL REPORTED: YES
FASTING STATUS PATIENT QL REPORTED: YES
GLUCOSE SERPL-MCNC: 100 MG/DL (ref 70–99)
HCT VFR BLD AUTO: 47.2 % (ref 40–53)
HDLC SERPL-MCNC: 39 MG/DL
HGB BLD-MCNC: 16.2 G/DL (ref 13.3–17.7)
LDLC SERPL CALC-MCNC: 106 MG/DL
MCH RBC QN AUTO: 30.1 PG (ref 26.5–33)
MCHC RBC AUTO-ENTMCNC: 34.3 G/DL (ref 31.5–36.5)
MCV RBC AUTO: 88 FL (ref 78–100)
NONHDLC SERPL-MCNC: 138 MG/DL
PLATELET # BLD AUTO: 270 10E3/UL (ref 150–450)
POTASSIUM SERPL-SCNC: 4.6 MMOL/L (ref 3.4–5.3)
PROT SERPL-MCNC: 7.2 G/DL (ref 6.4–8.3)
PSA SERPL DL<=0.01 NG/ML-MCNC: 0.76 NG/ML (ref 0–6.5)
RBC # BLD AUTO: 5.38 10E6/UL (ref 4.4–5.9)
SODIUM SERPL-SCNC: 139 MMOL/L (ref 135–145)
TRIGL SERPL-MCNC: 159 MG/DL
WBC # BLD AUTO: 10.2 10E3/UL (ref 4–11)

## 2024-05-08 PROCEDURE — G0103 PSA SCREENING: HCPCS | Performed by: INTERNAL MEDICINE

## 2024-05-08 PROCEDURE — G0438 PPPS, INITIAL VISIT: HCPCS | Performed by: INTERNAL MEDICINE

## 2024-05-08 PROCEDURE — 36415 COLL VENOUS BLD VENIPUNCTURE: CPT | Performed by: INTERNAL MEDICINE

## 2024-05-08 PROCEDURE — 99214 OFFICE O/P EST MOD 30 MIN: CPT | Mod: 25 | Performed by: INTERNAL MEDICINE

## 2024-05-08 PROCEDURE — 80061 LIPID PANEL: CPT | Performed by: INTERNAL MEDICINE

## 2024-05-08 PROCEDURE — 80053 COMPREHEN METABOLIC PANEL: CPT | Performed by: INTERNAL MEDICINE

## 2024-05-08 PROCEDURE — 85027 COMPLETE CBC AUTOMATED: CPT | Performed by: INTERNAL MEDICINE

## 2024-05-08 RX ORDER — SILDENAFIL 100 MG/1
100 TABLET, FILM COATED ORAL DAILY PRN
Qty: 10 TABLET | Refills: 3 | Status: SHIPPED | OUTPATIENT
Start: 2024-05-08 | End: 2024-10-02

## 2024-05-08 RX ORDER — RIVASTIGMINE 4.6 MG/24H
PATCH, EXTENDED RELEASE TRANSDERMAL
COMMUNITY
Start: 2023-12-11

## 2024-05-08 RX ORDER — AMOXICILLIN 500 MG/1
2000 CAPSULE ORAL ONCE
Qty: 12 CAPSULE | Refills: 1 | Status: SHIPPED | OUTPATIENT
Start: 2024-05-08 | End: 2024-05-08

## 2024-05-08 RX ORDER — RESPIRATORY SYNCYTIAL VIRUS VACCINE 120MCG/0.5
0.5 KIT INTRAMUSCULAR ONCE
Qty: 1 EACH | Refills: 0 | Status: CANCELLED | OUTPATIENT
Start: 2024-05-08 | End: 2024-05-08

## 2024-05-08 SDOH — HEALTH STABILITY: PHYSICAL HEALTH: ON AVERAGE, HOW MANY MINUTES DO YOU ENGAGE IN EXERCISE AT THIS LEVEL?: 20 MIN

## 2024-05-08 SDOH — HEALTH STABILITY: PHYSICAL HEALTH: ON AVERAGE, HOW MANY DAYS PER WEEK DO YOU ENGAGE IN MODERATE TO STRENUOUS EXERCISE (LIKE A BRISK WALK)?: 4 DAYS

## 2024-05-08 ASSESSMENT — SOCIAL DETERMINANTS OF HEALTH (SDOH): HOW OFTEN DO YOU GET TOGETHER WITH FRIENDS OR RELATIVES?: ONCE A WEEK

## 2024-05-08 NOTE — PROGRESS NOTES
Preventive Care Visit  [facility]  Gildardo Valle MD, Internal Medicine  May 8, 2024      Assessment & Plan   Problem List Items Addressed This Visit       Hyperlipidemia LDL goal <100    Relevant Orders    Lipid panel reflex to direct LDL Non-fasting    S/P MVR (mitral valve repair)    Relevant Medications    amoxicillin (AMOXIL) 500 MG capsule    RESOLVED: Bipolar disorder (H)     Other Visit Diagnoses       Medicare annual wellness visit, subsequent    -  Primary    Relevant Orders    Comprehensive metabolic panel (BMP + Alb, Alk Phos, ALT, AST, Total. Bili, TP)    CBC with platelets    Erectile dysfunction, unspecified erectile dysfunction type        Relevant Medications    sildenafil (VIAGRA) 100 MG tablet    Other Relevant Orders    CBC with platelets    Screening for prostate cancer        Relevant Orders    PSA, screen           Patient is here for Medicare wellness exam and to establish care [facility].  He is , retired as a .  [facility] after 12 [facility] diagnosis of bipolar.  Does not want any more immunizations today.  Colonoscopies up-to-date from 2020 good for 10 years  Memory is good  Exercises encouraged to increase but walks a mile a day with his dog.    Other issues addressed status post mitral valve repair sees cardiology gets an echocardiogram he will have some amoxicillin to use for dental prophylaxis with annuloplasty present.  Erectile dysfunction he has for some Viagra and we will refill that for him.  Bipolar is pretty well-controlled but needs to stay on his medication of Depakote.  Sleep issues he is on the Exelon patch which seems to work to limit some of his sleep activities walking and other activities.    Labs are done today, may eventually need statin therapy.               Patient has been advised of split billing requirements and indicates understanding: Yes  Ordering  "of each unique test  Prescription drug management       BMI  Estimated body mass index is 33.89 kg/m  as calculated from the following:    Height as of this encounter: 1.803 m (5' 11\").    Weight as of this encounter: 110.2 kg (243 lb).   Weight management plan: Discussed healthy diet and exercise guidelines    Counseling  Appropriate preventive services were discussed with this patient, including applicable screening as appropriate for fall prevention, nutrition, physical activity, Tobacco-use cessation, weight loss and cognition.  Checklist reviewing preventive services available has been given to the patient.  Reviewed patient's diet, addressing concerns and/or questions.   Discussed possible causes of fatigue.   FUTURE APPOINTMENTS:       - Follow-up for annual visit or as needed      Subjective   Kumar is a 68 year old, presenting for the following:  Physical        5/8/2024     7:43 AM   Additional Questions   Roomed by [facility] Care Directive  Patient does not have a Health Care Directive or Living Will: Discussed advance care planning with patient; information given to patient to review.    HPI    Patient here for Medicare wellness exam and to establish care.  [facility]  Wants a second opinion here to have something local.    Lives in town here,     [facility]    Has had mitral valve repair with annuloplasty,     Exelon patch for sleep activity     Colonoscopy in 2020 good for 10 years.           5/8/2024   General Health   How would you rate your overall physical health? (!) DECLINE   Feel stress (tense, anxious, or unable to sleep) To some extent   (!) STRESS CONCERN      5/8/2024   Nutrition   Diet: Regular (no restrictions)         5/8/2024   Exercise   Days per week of moderate/strenous exercise 4 days   Average minutes spent exercising at this level 20 min         5/8/2024   Social Factors   Frequency of gathering with friends or relatives Once " a week   Worry food won't last until get money to buy more No   Food not last or not have enough money for food? No   Do you have housing?  Yes   Are you worried about losing your housing? No   Lack of transportation? No   Unable to get utilities (heat,electricity)? No         5/8/2024   Fall Risk   Fallen 2 or more times in the past year? No   Trouble with walking or balance? Yes   Gait Speed Test Interpretation Less than or equal to 5.00 seconds - PASS   Lightheadedness,       5/8/2024   Activities of Daily Living- Home Safety   Needs help with the following daily activites None of the above   Safety concerns in the home None of the above         5/8/2024   Dental   Dentist two times every year? Yes         5/8/2024   Hearing Screening   Hearing concerns? None of the above         5/8/2024   Driving Risk Screening   Patient/family members have concerns about driving No         5/8/2024   General Alertness/Fatigue Screening   Have you been more tired than usual lately? (!) YES         5/8/2024   Urinary Incontinence Screening   Bothered by leaking urine in past 6 months No         5/8/2024   TB Screening   Were you born outside of the [place]? No         Today's PHQ-2 Score:       5/8/2024     7:43 AM   PHQ-2 ( 1999 [employer])   Q1: Little interest or pleasure in doing things 1   Q2: Feeling down, depressed or hopeless 1   PHQ-2 Score 2   Q1: Little interest or pleasure in doing things Several days   Q2: Feeling down, depressed or hopeless Several days   PHQ-2 Score 2           5/8/2024   Substance Use   Alcohol more than 3/day or more than 7/wk Not Applicable   Do you have a current opioid prescription? No   How severe/bad is pain from 1 to 10? 2/10   Do you use any other substances recreationally? No     Social History     Tobacco Use    Smoking status: Never    Smokeless tobacco: Never   Substance Use Topics    Alcohol use: Yes     Alcohol/week: 0.0 standard drinks of alcohol     Comment: 1-2 times a year    Drug use:  No         5/8/2024   AAA Screening   Family history of Abdominal Aortic Aneurysm (AAA)? No   ASCVD Risk   The 10-year ASCVD risk score (Dequan CARRERA, et al., 2019) is: 15.1%    Values used to calculate the score:      Age: 68 years      Sex: Male      Is Non- : No      Diabetic: No      Tobacco smoker: No      Systolic Blood Pressure: 118 mmHg      Is BP treated: No      HDL Cholesterol: 42 mg/dL      Total Cholesterol: 155 mg/dL  Reviewed and updated as needed this visit by Provider                    Lab work is in process  Current providers sharing in care for this patient include:  Patient Care Team:  Noah Boland PA-C as PCP - General (Physician Assistant)  Sonia iKm MD as Assigned Heart and Vascular Provider    The following health maintenance items are reviewed in Epic and correct as of today:  Health Maintenance   Topic Date Due    ANNUAL REVIEW OF  ORDERS  Never done    ADVANCE CARE PLANNING  Never done    HEPATITIS C SCREENING  Never done    ZOSTER IMMUNIZATION (1 of 2) Never done    RSV VACCINE (Pregnancy & 60+) (1 - 1-dose 60+ series) Never done    MEDICARE ANNUAL WELLNESS VISIT  Never done    Pneumococcal Vaccine: 65+ Years (1 of 1 - PCV) Never done    DTAP/TDAP/TD IMMUNIZATION (2 - Td or Tdap) 06/10/2019    LIPID  09/10/2021    COVID-19 Vaccine (3 - 2023-24 season) 09/01/2023    GLUCOSE  09/10/2023    INFLUENZA VACCINE (Season Ended) 09/01/2024    FALL RISK ASSESSMENT  05/08/2025    COLORECTAL CANCER SCREENING  04/01/2030    PHQ-2 (once per calendar year)  Completed    IPV IMMUNIZATION  Aged Out    HPV IMMUNIZATION  Aged Out    MENINGITIS IMMUNIZATION  Aged Out    RSV MONOCLONAL ANTIBODY  Aged Out         Review of Systems  CONSTITUTIONAL: NEGATIVE for fever, chills, change in weight  INTEGUMENTARY/SKIN: NEGATIVE for worrisome rashes, moles or lesions  EYES: NEGATIVE for vision changes or irritation  ENT/MOUTH: NEGATIVE for ear, mouth and throat  "problems  RESP: NEGATIVE for significant cough or SOB  CV: NEGATIVE for chest pain, palpitations or peripheral edema  GI: NEGATIVE for nausea, abdominal pain, heartburn, or change in bowel habits  : NEGATIVE for frequency, dysuria, or hematuria  MUSCULOSKELETAL: NEGATIVE for significant arthralgias or myalgia  NEURO: NEGATIVE for weakness, dizziness or paresthesias  ENDOCRINE: NEGATIVE for temperature intolerance, skin/hair changes  HEME: NEGATIVE for bleeding problems  PSYCHIATRIC: NEGATIVE for changes in mood or affect  Fungal nail disease but trimming it and doing okay     Objective    Exam  /80   Pulse 80   Temp 96.9  F (36.1  C) (Temporal)   Resp 16   Ht 1.803 m (5' 11\")   Wt 110.2 kg (243 lb)   SpO2 98%   BMI 33.89 kg/m     Estimated body mass index is 33.89 kg/m  as calculated from the following:    Height as of this encounter: 1.803 m (5' 11\").    Weight as of this encounter: 110.2 kg (243 lb).    Physical Exam  GENERAL: alert and no distress  EYES: Eyes grossly normal to inspection, PERRL and conjunctivae and sclerae normal  HENT: ear canals and TM's normal, nose and mouth without ulcers or lesions  NECK: no adenopathy, no asymmetry, masses, or scars--no carotid bruits  RESP: lungs clear to auscultation - no rales, rhonchi or wheezes  CV: regular rate and rhythm, normal S1 S2, no S3 or S4, no murmur, click or rub, no peripheral edema  ABDOMEN: soft, nontender, no hepatosplenomegaly, no masses and bowel sounds normal  MS: 1+ pitting edema  SKIN: no suspicious lesions or rashes  NEURO: Normal strength and tone, mentation intact and speech normal  PSYCH: mentation appears normal, affect normal/bright        5/8/2024   Mini Cog   Clock Draw Score 2 Normal   3 Item Recall 3 objects recalled   Mini Cog Total Score 5        Signed Electronically by: Gildardo Valle MD  "

## 2024-05-08 NOTE — LETTER
May 9, 2024      Kumar Payne  [address] [facility] [address]        Dear ,    We are writing to inform you of your test results.    Your labs are good. Normal cholesterol, liver kidneys glucose and PSA was okay. No changes needed.     Resulted Orders   Lipid panel reflex to direct LDL Non-fasting   Result Value Ref Range    Cholesterol 177 <200 mg/dL    Triglycerides 159 (H) <150 mg/dL    Direct Measure HDL 39 (L) >=40 mg/dL    LDL Cholesterol Calculated 106 (H) <=100 mg/dL    Non HDL Cholesterol 138 (H) <130 mg/dL    Patient Fasting > 8hrs? Yes     Narrative    Cholesterol  Desirable:  <200 mg/dL    Triglycerides  Normal:  Less than 150 mg/dL  Borderline High:  150-199 mg/dL  High:  200-499 mg/dL  Very High:  Greater than or equal to 500 mg/dL    Direct Measure HDL  Female:  Greater than or equal to 50 mg/dL   Male:  Greater than or equal to 40 mg/dL    LDL Cholesterol  Desirable:  <100mg/dL  Above Desirable:  100-129 mg/dL   Borderline High:  130-159 mg/dL   High:  160-189 mg/dL   Very High:  >= 190 mg/dL    Non HDL Cholesterol  Desirable:  130 mg/dL  Above Desirable:  130-159 mg/dL  Borderline High:  160-189 mg/dL  High:  190-219 mg/dL  Very High:  Greater than or equal to 220 mg/dL   Comprehensive metabolic panel (BMP + Alb, Alk Phos, ALT, AST, Total. Bili, TP)   Result Value Ref Range    Sodium 139 135 - 145 mmol/L      Comment:      Reference intervals for this test were updated on 09/26/2023 to more accurately reflect our healthy population. There may be differences in the flagging of prior results with similar values performed with this method. Interpretation of those prior results can be made in the context of the updated reference intervals.     Potassium 4.6 3.4 - 5.3 mmol/L    Carbon Dioxide (CO2) 22 22 - 29 mmol/L    Anion Gap 12 7 - 15 mmol/L    Urea Nitrogen 11.3 8.0 - 23.0 mg/dL    Creatinine 0.92 0.67 - 1.17 mg/dL    GFR Estimate 89 >60 mL/min/1.73m2    Calcium 9.2 8.8 - 10.2  mg/dL    Chloride 105 98 - 107 mmol/L    Glucose 100 (H) 70 - 99 mg/dL    Alkaline Phosphatase 102 40 - 150 U/L      Comment:      Reference intervals for this test were updated on 11/14/2023 to more accurately reflect our healthy population. There may be differences in the flagging of prior results with similar values performed with this method. Interpretation of those prior results can be made in the context of the updated reference intervals.    AST 36 0 - 45 U/L      Comment:      Reference intervals for this test were updated on 6/12/2023 to more accurately reflect our healthy population. There may be differences in the flagging of prior results with similar values performed with this method. Interpretation of those prior results can be made in the context of the updated reference intervals.    ALT 36 0 - 70 U/L      Comment:      Reference intervals for this test were updated on 6/12/2023 to more accurately reflect our healthy population. There may be differences in the flagging of prior results with similar values performed with this method. Interpretation of those prior results can be made in the context of the updated reference intervals.      Protein Total 7.2 6.4 - 8.3 g/dL    Albumin 4.2 3.5 - 5.2 g/dL    Bilirubin Total 0.5 <=1.2 mg/dL    Patient Fasting > 8hrs? Yes    CBC with platelets   Result Value Ref Range    WBC Count 10.2 4.0 - 11.0 10e3/uL    RBC Count 5.38 4.40 - 5.90 10e6/uL    Hemoglobin 16.2 13.3 - 17.7 g/dL    Hematocrit 47.2 40.0 - 53.0 %    MCV 88 78 - 100 fL    MCH 30.1 26.5 - 33.0 pg    MCHC 34.3 31.5 - 36.5 g/dL    RDW 12.6 10.0 - 15.0 %    Platelet Count 270 150 - 450 10e3/uL   PSA, screen   Result Value Ref Range    Prostate Specific Antigen Screen 0.76 0.00 - 6.50 ng/mL    Narrative    This result is obtained using the Roche Elecsys total PSA method on the ho [id number] immunoassay analyzer. Results obtained with different assay methods or kits cannot be used interchangeably.       If  you have any questions or concerns, please call the clinic at the number listed above.       Sincerely,  Gildardo Valle MD/ Care Team

## 2024-05-08 NOTE — TELEPHONE ENCOUNTER
----- Message from Gildardo Valle MD sent at 5/8/2024  9:55 AM CDT -----  Please call him and let him know his labs are good.  Normal cholesterol, liver kidneys glucose and PSA was okay.  No changes needed.

## 2024-05-09 NOTE — TELEPHONE ENCOUNTER
3rd attempt, called and LM for patient to call back. Please relay below. Letter sent as well since unable to reach by phone.   Cristel Cespedes MA

## 2024-05-09 NOTE — TELEPHONE ENCOUNTER
2nd attempt, called and LM for patient to call back. Please relay results below.   Cristel Cespedes MA

## 2024-07-29 ENCOUNTER — OFFICE VISIT (OUTPATIENT)
Dept: FAMILY MEDICINE | Facility: CLINIC | Age: 69
End: 2024-07-29
Payer: COMMERCIAL

## 2024-07-29 ENCOUNTER — NURSE TRIAGE (OUTPATIENT)
Dept: INTERNAL MEDICINE | Facility: CLINIC | Age: 69
End: 2024-07-29

## 2024-07-29 VITALS
WEIGHT: 242.8 LBS | BODY MASS INDEX: 33.99 KG/M2 | RESPIRATION RATE: 18 BRPM | DIASTOLIC BLOOD PRESSURE: 83 MMHG | SYSTOLIC BLOOD PRESSURE: 121 MMHG | HEIGHT: 71 IN | OXYGEN SATURATION: 97 % | HEART RATE: 88 BPM | TEMPERATURE: 97.4 F

## 2024-07-29 DIAGNOSIS — B02.7 DISSEMINATED HERPES ZOSTER: Primary | ICD-10-CM

## 2024-07-29 PROCEDURE — 99214 OFFICE O/P EST MOD 30 MIN: CPT | Performed by: NURSE PRACTITIONER

## 2024-07-29 PROCEDURE — G2211 COMPLEX E/M VISIT ADD ON: HCPCS | Performed by: NURSE PRACTITIONER

## 2024-07-29 RX ORDER — VALACYCLOVIR HYDROCHLORIDE 1 G/1
1000 TABLET, FILM COATED ORAL 3 TIMES DAILY
Qty: 21 TABLET | Refills: 0 | Status: SHIPPED | OUTPATIENT
Start: 2024-07-29 | End: 2024-08-05

## 2024-07-29 ASSESSMENT — PAIN SCALES - GENERAL: PAINLEVEL: NO PAIN (0)

## 2024-07-29 NOTE — PROGRESS NOTES
Assessment & Plan     Disseminated herpes zoster  - valACYclovir (VALTREX) 1000 mg tablet; Take 1 tablet (1,000 mg) by mouth 3 times daily for 7 days    Presentation consistent with herpes zoster   Symptom onset less than 72 hours ago, start Valacyclovir as above  Discussed risk/benefit of anti-viral therapy he will start this today  Symptom management discussed, tylenol and ibuprofen as needed for pain  Notify the clinic if severe pain develops, fever, chills, nausea, vomiting etc.   Encouraged consideration of shingles vaccination     Follow-up with new or worsening symptoms, questions or concerns. I explained my diagnostic considerations and recommendations to the patient, who voiced understanding and agreement with the assessment and treatment plan. All questions were answered to patient's apparent satisfaction. We discussed potential side effects of any prescribed or recommended therapies, as well as expectations for response to treatments and importance of lifestyle measures that may improve symptoms. Patient was advised to contact our office if there are new symptoms or no improvement or worsening of conditions or symptoms.    The longitudinal plan of care for the diagnosis(es)/condition(s) as documented were addressed during this visit. Due to the added complexity in care, I will continue to support Kumar in the subsequent management and with ongoing continuity of care.            Subjective   Kumar is a 68 year old, presenting for the following health issues:  Shingles      7/29/2024     3:36 PM   Additional Questions   Roomed by Kayley   Accompanied by wife - brea         7/29/2024     3:36 PM   Patient Reported Additional Medications   Patient reports taking the following new medications none     History of Present Illness       Reason for visit:  Shingles  Symptom onset:  1-3 days ago  Symptoms include:  Red bumps, itchy, raised, started above elbow and moved up to the shoulder, painful  Symptom  intensity:  Mild  Symptom progression:  Worsening  Had these symptoms before:  Yes  Has tried/received treatment for these symptoms:  Yes  Previous treatment was successful:  Yes  Prior treatment description:  Medication  What makes it worse:  None  What makes it better:  AC    He eats 0-1 servings of fruits and vegetables daily.He consumes 2 sweetened beverage(s) daily.He exercises with enough effort to increase his heart rate 20 to 29 minutes per day.  He exercises with enough effort to increase his heart rate 5 days per week.   He is taking medications regularly.     Kumar presents today with concerns of shingles. He has developed a rash on the outer aspect of his right upper arm, spreading upwards towards his right shoulder. He started developing the rash, itching and pain starting yesterday morning. He denies any fever, chills, chest pain or difficulty breathing. He has had a history of shingles previously, last about 5 years ago. His previous episodes were on his back and then on his face. He feels his symptoms today are consistent with this. He has been treated with Valacyclovir previously which worked well for him in terms of preventing worsening of his symptoms. He has not been vaccinated for shingles. He is planning on traveling to [place] later this week.     Patient Active Problem List   Diagnosis    Hyperlipidemia LDL goal <100    Heart valve disease    Status post coronary angiogram    Mitral valve prolapse    Chronic diarrhea    S/P MVR (mitral valve repair)    Fluid overload    Transient hyperglycemia post procedure    Chest wall pain    Double vision with both eyes open     Current Outpatient Medications   Medication Sig Dispense Refill    aspirin (ASA) 325 MG EC tablet Take 325 mg by mouth daily as needed for moderate pain (headache)      divalproex sodium extended-release (DEPAKOTE ER) 500 MG 24 hr tablet Take 1,000 mg by mouth every evening (takes 2 x 500mg)      rivastigmine (EXELON) 4.6 MG/24HR  "24 hr patch APPLY 1 PATCH TOPICALLY EVERY DAY      sildenafil (VIAGRA) 100 MG tablet Take 1 tablet (100 mg) by mouth daily as needed 10 tablet 3    sildenafil (VIAGRA) 100 MG tablet 100 mg      valACYclovir (VALTREX) 1000 mg tablet Take 1 tablet (1,000 mg) by mouth 3 times daily for 7 days 21 tablet 0     No current facility-administered medications for this visit.         Review of Systems  Constitutional, HEENT, cardiovascular, pulmonary, gi and gu systems are negative, except as otherwise noted.      Objective    /83 (BP Location: Left arm, Patient Position: Sitting, Cuff Size: Adult Large)   Pulse 88   Temp 97.4  F (36.3  C) (Oral)   Resp 18   Ht 1.803 m (5' 11\")   Wt 110.1 kg (242 lb 12.8 oz)   SpO2 97%   BMI 33.86 kg/m    Body mass index is 33.86 kg/m .  Physical Exam  Vitals reviewed.   Constitutional:       General: He is not in acute distress.     Appearance: Normal appearance.   HENT:      Head: Normocephalic and atraumatic.      Mouth/Throat:      Mouth: Mucous membranes are moist.      Pharynx: Oropharynx is clear.   Eyes:      Extraocular Movements: Extraocular movements intact.      Conjunctiva/sclera: Conjunctivae normal.      Pupils: Pupils are equal, round, and reactive to light.   Cardiovascular:      Rate and Rhythm: Normal rate and regular rhythm.      Heart sounds: Normal heart sounds.   Pulmonary:      Effort: Pulmonary effort is normal.      Breath sounds: Normal breath sounds.   Musculoskeletal:      Cervical back: Neck supple.   Lymphadenopathy:      Cervical: No cervical adenopathy.   Skin:     General: Skin is warm and dry.      Comments: Papular rash on posterior right upper arm, wrapping around and slightly distal to the right anterior upper arm. No blistering or crusting present. No open lesions.    Neurological:      Mental Status: He is alert and oriented to person, place, and time. Mental status is at baseline.   Psychiatric:         Mood and Affect: Mood normal.         " Behavior: Behavior normal.                    Signed Electronically by: Aylin Jones NP

## 2024-07-29 NOTE — TELEPHONE ENCOUNTER
Nurse Triage SBAR    Is this a 2nd Level Triage? YES, LICENSED PRACTITIONER REVIEW IS REQUIRED    Situation: Patient and spouse calling to report patient has a shingles rash to his R arm. He reports the rash appeared yesterday morning. He states he has had this before and was treated with acyclovir. He said it is starting to burn and hurt. Areas are not yet blistered but are raised and red at this time.    Background: Has a history of Shingles     Assessment: Patient is not running a fever, and reports there is no spreading redness to the rash.     Protocol Recommended Disposition:   See Today In Office    Recommendation: Patient scheduled with same day provider this afternoon.         Does the patient meet one of the following criteria for ADS visit consideration? No    LYUDMILA Haider, RN        Reason for Disposition   Shingles rash (matches SYMPTOMS) and onset within past 72 hours    Additional Information   Negative: Difficult to awaken or acting confused (e.g., disoriented, slurred speech)   Negative: Sounds like a life-threatening emergency to the triager   Negative: Localized rash and doesn't match the SYMPTOMS of shingles   Negative: Back pain and doesn't match the SYMPTOMS of shingles   Negative: Shingles Vaccine (Recombinant Zoster Vaccine; RZV; Shingrix), questions about   Negative: Shingles rash of face and eye pain or blurred vision   Negative: Shingles rash on the eyelid or tip of the nose   Negative: Shingles rash and spots start appearing other places on body   Negative: Patient sounds very sick or weak to the triager   Negative: Shingles rash (matches SYMPTOMS) and weak immune system (e.g., HIV positive,  cancer chemotherapy, chronic steroid treatment, splenectomy) and NOT taking antiviral medication   Negative: Shingles rash of face and facial weakness   Negative: Shingles rash of face or ear and earache or ringing in the ear   Negative: Fever > 100.4 F (38.0 C)   Negative: SEVERE pain (e.g.,  excruciating)    Protocols used: Shingles-A-OH

## 2024-09-25 ENCOUNTER — HOSPITAL ENCOUNTER (OUTPATIENT)
Dept: CARDIOLOGY | Facility: CLINIC | Age: 69
Discharge: HOME | End: 2024-09-25
Attending: INTERNAL MEDICINE | Admitting: INTERNAL MEDICINE
Payer: COMMERCIAL

## 2024-09-25 DIAGNOSIS — Z98.890 S/P MITRAL VALVE REPAIR: ICD-10-CM

## 2024-09-25 LAB — BI-PLANE LVEF ECHO: NORMAL

## 2024-09-25 PROCEDURE — 93306 TTE W/DOPPLER COMPLETE: CPT | Mod: 26 | Performed by: INTERNAL MEDICINE

## 2024-09-25 PROCEDURE — 93306 TTE W/DOPPLER COMPLETE: CPT

## 2024-10-02 ENCOUNTER — OFFICE VISIT (OUTPATIENT)
Dept: CARDIOLOGY | Facility: CLINIC | Age: 69
End: 2024-10-02
Attending: INTERNAL MEDICINE
Payer: COMMERCIAL

## 2024-10-02 VITALS
OXYGEN SATURATION: 98 % | DIASTOLIC BLOOD PRESSURE: 84 MMHG | SYSTOLIC BLOOD PRESSURE: 112 MMHG | HEIGHT: 71 IN | RESPIRATION RATE: 18 BRPM | BODY MASS INDEX: 33.74 KG/M2 | HEART RATE: 68 BPM | WEIGHT: 241 LBS

## 2024-10-02 DIAGNOSIS — Z98.890 S/P MITRAL VALVE REPAIR: ICD-10-CM

## 2024-10-02 PROCEDURE — 99214 OFFICE O/P EST MOD 30 MIN: CPT | Performed by: INTERNAL MEDICINE

## 2024-10-02 RX ORDER — ATORVASTATIN CALCIUM 10 MG/1
10 TABLET, FILM COATED ORAL DAILY
COMMUNITY
Start: 2024-09-19

## 2024-10-02 ASSESSMENT — PAIN SCALES - GENERAL: PAINLEVEL: NO PAIN (0)

## 2024-10-02 NOTE — PROGRESS NOTES
HISTORY:    Kumar Payne is a pleasant 69-year-old patient previously cared for by Dr. Kim, who recently left our practice.  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  He had a brief episode of atrial fibrillation postoperatively and was placed on amiodarone for a short while.  He has had no recognized recurrence of his atrial fibrillation.  Kumar is here today for routine follow-up visit.    Today Kumar reports that he feels that he is generally doing well.  His energy and stamina are normal.  He has not had any sense of palpitations and he denies any exertional chest arm neck shoulder or jaw discomfort.  He also denies symptoms of PND/orthopnea, syncope or near syncope, peripheral edema, or claudication.  He does note some occasional mild orthostatic blood pressure drop but this is not interfering with his lifestyle.    An echocardiogram was recently repeated and I reviewed it with him today.  It shows that his valve is functioning normally without detectable mitral regurgitation.  His mean gradient is 5 mmHg.  The echo is unchanged from his last echo done a year ago.      ASSESSMENT/PLAN:    1.  History of severe mitral regurgitation due to a torn cord with mitral valve repair in May 2020.  Echocardiogram shows that the valve continues to function normally and the patient remains asymptomatic from a cardiac standpoint.  2.  Brief postoperative atrial fibrillation, no recurrence.  I warned him that his postoperative atrial fibrillation puts him at a slightly higher long-term risk of developing atrial fibrillation and that he should contact us if he has any sensation of irregular heart beating.    Thank you for inviting me to participate in the care of your patient.  Please don't hesitate to call if I can be of further assistance.  30 minutes were spent  today reviewing the chart and other records, interviewing and examining the patient, and documenting our visit.  2-year follow-up with echocardiogram will be planned.    Chart documentation was completed, in part, with [employer] voice-recognition software. Even though reviewed, some grammatical, spelling, and word errors may remain.       Orders Placed This Encounter   Procedures    Follow-Up with Cardiologist     Orders Placed This Encounter   Medications    atorvastatin (LIPITOR) 10 MG tablet     Sig: Take 10 mg by mouth daily.     Medications Discontinued During This Encounter   Medication Reason    sildenafil (VIAGRA) 100 MG tablet Duplicate Therapy (No AVS / No eCancel)       10 year ASCVD risk: The 10-year ASCVD risk score (Dequan CARRERA, et al., 2019) is: 16.3%    Values used to calculate the score:      Age: 69 years      Sex: Male      Is Non- : No      Diabetic: No      Tobacco smoker: No      Systolic Blood Pressure: 112 mmHg      Is BP treated: No      HDL Cholesterol: 39 mg/dL      Total Cholesterol: 177 mg/dL    Encounter Diagnosis   Name Primary?    S/P mitral valve repair        CURRENT MEDICATIONS:  Current Outpatient Medications   Medication Sig Dispense Refill    aspirin (ASA) 325 MG EC tablet Take 325 mg by mouth daily as needed for moderate pain (headache)      atorvastatin (LIPITOR) 10 MG tablet Take 10 mg by mouth daily.      divalproex sodium extended-release (DEPAKOTE ER) 500 MG 24 hr tablet Take 1,000 mg by mouth every evening (takes 2 x 500mg)      rivastigmine (EXELON) 4.6 MG/24HR 24 hr patch APPLY 1 PATCH TOPICALLY EVERY DAY      sildenafil (VIAGRA) 100 MG tablet 100 mg      valACYclovir (VALTREX) 1000 mg tablet Take 1 tablet (1,000 mg) by mouth 3 times daily for 7 days 21 tablet 0       ALLERGIES   No Known Allergies    PAST MEDICAL HISTORY:  Past Medical History:   Diagnosis Date    Bipolar 1 disorder (H)     Bipolar disorder (H)     on Depakote    Mitral valve  regurgitation        PAST SURGICAL HISTORY:  Past Surgical History:   Procedure Laterality Date    COLONOSCOPY N/A 4/1/2020    Procedure: Colonoscopy with biopsy;  Surgeon: Sebastián Nam MD;  Location:  GI    CV CORONARY ANGIOGRAM N/A 3/16/2020    Procedure: Coronary Angiogram;  Surgeon: Tyrone Fournier MD;  Location:  [facility]    REPAIR VALVE MITRAL MINIMALLY INVASIVE N/A 5/4/2020    Procedure: MINIMALLY INVASIVE MITRAL VALVE REPAIR WITH PHYSIO RING SIZE 34 MM, ON PUMP WITH PHILLIP READ BY DR MOISE (CARDIOLOGY).;  Surgeon: Evelin Coronel MD;  Location:  OR       FAMILY HISTORY:  No family history on file.    SOCIAL HISTORY:  Social History     Socioeconomic History    Marital status:      Spouse name: None    Number of children: None    Years of education: None    Highest education level: None   Tobacco Use    Smoking status: Never     Passive exposure: Never    Smokeless tobacco: Never   Vaping Use    Vaping status: Never Used   Substance and Sexual Activity    Alcohol use: Yes     Alcohol/week: 0.0 standard drinks of alcohol     Comment: 1-2 times a year    Drug use: No    Sexual activity: Yes     Partners: Female     Social Determinants of Health     Financial Resource Strain: Low Risk  (5/8/2024)    Financial Resource Strain     Within the past 12 months, have you or your family members you live with been unable to get utilities (heat, electricity) when it was really needed?: No   Food Insecurity: Low Risk  (5/8/2024)    Food Insecurity     Within the past 12 months, did you worry that your food would run out before you got money to buy more?: No     Within the past 12 months, did the food you bought just not last and you didn t have money to get more?: No   Transportation Needs: Low Risk  (5/8/2024)    Transportation Needs     Within the past 12 months, has lack of transportation kept you from medical appointments, getting your medicines, non-medical meetings or  "appointments, work, or from getting things that you need?: No   Physical Activity: Insufficiently Active (5/8/2024)    Exercise Vital Sign     Days of Exercise per Week: 4 days     Minutes of Exercise per Session: 20 min   Stress: Stress Concern Present (5/8/2024)    [employer] [place] [employer] - Occupational Stress Questionnaire     Feeling of Stress : To some extent   Social Connections: Unknown (5/8/2024)    Social Connection and Isolation Panel [NHANES]     Frequency of Social Gatherings with Friends and Family: Once a week   Interpersonal Safety: Low Risk  (5/8/2024)    Interpersonal Safety     Do you feel physically and emotionally safe where you currently live?: Yes     Within the past 12 months, have you been hit, slapped, kicked or otherwise physically hurt by someone?: No     Within the past 12 months, have you been humiliated or emotionally abused in other ways by your partner or ex-partner?: No   Housing Stability: Low Risk  (5/8/2024)    Housing Stability     Do you have housing? : Yes     Are you worried about losing your housing?: No       Review of Systems:  Skin:        Eyes:  Positive for glasses  ENT:       Respiratory:  Negative shortness of breath;cough;wheezing  Cardiovascular:  Negative;palpitations;chest pain;edema Positive for;lightheadedness;dizziness;syncope or near-syncope  Gastroenterology:      Genitourinary:       Musculoskeletal:       Neurologic:  Negative numbness or tingling of hands;numbness or tingling of feet  Psychiatric:       Heme/Lymph/Imm:       Endocrine:         Physical Exam:  Vitals: /84 (BP Location: Right arm, Patient Position: Sitting, Cuff Size: Adult Large)   Pulse 68   Resp 18   Ht 1.803 m (5' 11\")   Wt 109.3 kg (241 lb)   SpO2 98%   BMI 33.61 kg/m      Constitutional:  cooperative, alert and oriented, well developed, well nourished, in no acute distress overweight      Skin:  warm and dry to the touch, no apparent skin lesions or masses " noted        Head:  normocephalic, no masses or lesions        Eyes:  pupils equal and round, conjunctivae and lids unremarkable, sclera white, no xanthalasma, EOMS intact, no nystagmus        ENT:  no pallor or cyanosis, dentition good        Neck:  carotid pulses are full and equal bilaterally, JVP normal, no carotid bruit        Chest:  normal breath sounds, clear to auscultation, normal A-P diameter, normal symmetry, normal respiratory excursion, no use of accessory muscles        Cardiac: regular rhythm, normal S1/S2, no S3 or S4, apical impulse not displaced, no murmurs, gallops or rubs                  Abdomen:  abdomen soft;BS normoactive        Vascular: pulses full and equal                                      Extremities and Muscular Skeletal:  no edema           Neurological:  no gross motor deficits        Psych:  affect appropriate, oriented to time, person and place     Recent Lab Results:  LIPID RESULTS:  Lab Results   Component Value Date    CHOL 177 05/08/2024    CHOL 155 09/10/2020    HDL 39 (L) 05/08/2024    HDL 42 09/10/2020     (H) 05/08/2024    LDL 78 09/10/2020    TRIG 159 (H) 05/08/2024    TRIG 176 (H) 09/10/2020       LIVER ENZYME RESULTS:  Lab Results   Component Value Date    AST 36 05/08/2024    AST 27 09/10/2020    ALT 36 05/08/2024    ALT 41 09/10/2020       CBC RESULTS:  Lab Results   Component Value Date    WBC 10.2 05/08/2024    WBC 16.0 (H) 05/19/2020    RBC 5.38 05/08/2024    RBC 4.62 05/19/2020    HGB 16.2 05/08/2024    HGB 14.0 05/19/2020    HCT 47.2 05/08/2024    HCT 42.3 05/19/2020    MCV 88 05/08/2024    MCV 92 05/19/2020    MCH 30.1 05/08/2024    MCH 30.3 05/19/2020    MCHC 34.3 05/08/2024    MCHC 33.1 05/19/2020    RDW 12.6 05/08/2024    RDW 13.1 05/19/2020     05/08/2024     (H) 05/19/2020       BMP RESULTS:  Lab Results   Component Value Date     05/08/2024     09/10/2020    POTASSIUM 4.6 05/08/2024    POTASSIUM 4.1 09/10/2020    CHLORIDE  105 05/08/2024    CHLORIDE 108 09/10/2020    CO2 22 05/08/2024    CO2 28 09/10/2020    ANIONGAP 12 05/08/2024    ANIONGAP 5 09/10/2020     (H) 05/08/2024    GLC 80 09/10/2020    BUN 11.3 05/08/2024    BUN 14 09/10/2020    CR 0.92 05/08/2024    CR 0.82 09/10/2020    GFRESTIMATED 89 05/08/2024    GFRESTIMATED >90 09/10/2020    GFRESTBLACK >90 09/10/2020    RUEL 9.2 05/08/2024    RUEL 8.9 09/10/2020        A1C RESULTS:  Lab Results   Component Value Date    A1C 5.5 04/28/2020       INR RESULTS:  Lab Results   Component Value Date    INR 1.31 (H) 05/04/2020    INR 1.48 (H) 05/04/2020         Tyrone Irving MD, FACC    CC  Sonia Kim MD  No address on file

## 2024-10-02 NOTE — LETTER
10/2/2024    Gildardo Valle MD  [address] [facility] [address]    RE: Kumar Payne       Dear Colleague,     I had the pleasure of seeing Kumar Payne in the [facility].  HISTORY:    Kumar Payne is a pleasant 69-year-old patient previously cared for by Dr. Kim, who recently left our practice.  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  He had a brief episode of atrial fibrillation postoperatively and was placed on amiodarone for a short while.  He has had no recognized recurrence of his atrial fibrillation.  Kumar is here today for routine follow-up visit.    Today Kumar reports that he feels that he is generally doing well.  His energy and stamina are normal.  He has not had any sense of palpitations and he denies any exertional chest arm neck shoulder or jaw discomfort.  He also denies symptoms of PND/orthopnea, syncope or near syncope, peripheral edema, or claudication.  He does note some occasional mild orthostatic blood pressure drop but this is not interfering with his lifestyle.    An echocardiogram was recently repeated and I reviewed it with him today.  It shows that his valve is functioning normally without detectable mitral regurgitation.  His mean gradient is 5 mmHg.  The echo is unchanged from his last echo done a year ago.      ASSESSMENT/PLAN:    1.  History of severe mitral regurgitation due to a torn cord with mitral valve repair in May 2020.  Echocardiogram shows that the valve continues to function normally and the patient remains asymptomatic from a cardiac standpoint.  2.  Brief postoperative atrial fibrillation, no recurrence.  I warned him that his postoperative atrial fibrillation puts him at a slightly higher long-term risk of developing atrial fibrillation and that he should contact us if he has  any sensation of irregular heart beating.    Thank you for inviting me to participate in the care of your patient.  Please don't hesitate to call if I can be of further assistance.  30 minutes were spent today reviewing the chart and other records, interviewing and examining the patient, and documenting our visit.    Chart documentation was completed, in part, with [employer] voice-recognition software. Even though reviewed, some grammatical, spelling, and word errors may remain.       Orders Placed This Encounter   Procedures     Follow-Up with Cardiologist     Orders Placed This Encounter   Medications     atorvastatin (LIPITOR) 10 MG tablet     Sig: Take 10 mg by mouth daily.     Medications Discontinued During This Encounter   Medication Reason     sildenafil (VIAGRA) 100 MG tablet Duplicate Therapy (No AVS / No eCancel)       10 year ASCVD risk: The 10-year ASCVD risk score (Dequan CARRERA, et al., 2019) is: 16.3%    Values used to calculate the score:      Age: 69 years      Sex: Male      Is Non- : No      Diabetic: No      Tobacco smoker: No      Systolic Blood Pressure: 112 mmHg      Is BP treated: No      HDL Cholesterol: 39 mg/dL      Total Cholesterol: 177 mg/dL    Encounter Diagnosis   Name Primary?     S/P mitral valve repair        CURRENT MEDICATIONS:  Current Outpatient Medications   Medication Sig Dispense Refill     aspirin (ASA) 325 MG EC tablet Take 325 mg by mouth daily as needed for moderate pain (headache)       atorvastatin (LIPITOR) 10 MG tablet Take 10 mg by mouth daily.       divalproex sodium extended-release (DEPAKOTE ER) 500 MG 24 hr tablet Take 1,000 mg by mouth every evening (takes 2 x 500mg)       rivastigmine (EXELON) 4.6 MG/24HR 24 hr patch APPLY 1 PATCH TOPICALLY EVERY DAY       sildenafil (VIAGRA) 100 MG tablet 100 mg       valACYclovir (VALTREX) 1000 mg tablet Take 1 tablet (1,000 mg) by mouth 3 times daily for 7 days 21 tablet 0       ALLERGIES   No Known  Allergies    PAST MEDICAL HISTORY:  Past Medical History:   Diagnosis Date     Bipolar 1 disorder (H)      Bipolar disorder (H)     on Depakote     Mitral valve regurgitation        PAST SURGICAL HISTORY:  Past Surgical History:   Procedure Laterality Date     COLONOSCOPY N/A 4/1/2020    Procedure: Colonoscopy with biopsy;  Surgeon: Sebastián Nam MD;  Location:  GI     CV CORONARY ANGIOGRAM N/A 3/16/2020    Procedure: Coronary Angiogram;  Surgeon: Tyrone Fournier MD;  Location:  HEART CARDIAC CATH LAB     REPAIR VALVE MITRAL MINIMALLY INVASIVE N/A 5/4/2020    Procedure: MINIMALLY INVASIVE MITRAL VALVE REPAIR WITH PHYSIO RING SIZE 34 MM, ON PUMP WITH PHILLIP READ BY DR MOISE (CARDIOLOGY).;  Surgeon: Evelin Coronel MD;  Location:  OR       FAMILY HISTORY:  No family history on file.    SOCIAL HISTORY:  Social History     Socioeconomic History     Marital status:      Spouse name: None     Number of children: None     Years of education: None     Highest education level: None   Tobacco Use     Smoking status: Never     Passive exposure: Never     Smokeless tobacco: Never   Vaping Use     Vaping status: Never Used   Substance and Sexual Activity     Alcohol use: Yes     Alcohol/week: 0.0 standard drinks of alcohol     Comment: 1-2 times a year     Drug use: No     Sexual activity: Yes     Partners: Female     Social Determinants of Health     Financial Resource Strain: Low Risk  (5/8/2024)    Financial Resource Strain      Within the past 12 months, have you or your family members you live with been unable to get utilities (heat, electricity) when it was really needed?: No   Food Insecurity: Low Risk  (5/8/2024)    Food Insecurity      Within the past 12 months, did you worry that your food would run out before you got money to buy more?: No      Within the past 12 months, did the food you bought just not last and you didn t have money to get more?: No   Transportation Needs: Low Risk   "(5/8/2024)    Transportation Needs      Within the past 12 months, has lack of transportation kept you from medical appointments, getting your medicines, non-medical meetings or appointments, work, or from getting things that you need?: No   Physical Activity: Insufficiently Active (5/8/2024)    Exercise Vital Sign      Days of Exercise per Week: 4 days      Minutes of Exercise per Session: 20 min   Stress: Stress Concern Present (5/8/2024)    [place] - Occupational Stress Questionnaire      Feeling of Stress : To some extent   Social Connections: Unknown (5/8/2024)    Social Connection and Isolation Panel [NHANES]      Frequency of Social Gatherings with Friends and Family: Once a week   Interpersonal Safety: Low Risk  (5/8/2024)    Interpersonal Safety      Do you feel physically and emotionally safe where you currently live?: Yes      Within the past 12 months, have you been hit, slapped, kicked or otherwise physically hurt by someone?: No      Within the past 12 months, have you been humiliated or emotionally abused in other ways by your partner or ex-partner?: No   Housing Stability: Low Risk  (5/8/2024)    Housing Stability      Do you have housing? : Yes      Are you worried about losing your housing?: No       Review of Systems:  Skin:        Eyes:  Positive for glasses  ENT:       Respiratory:  Negative shortness of breath;cough;wheezing  Cardiovascular:  Negative;palpitations;chest pain;edema Positive for;lightheadedness;dizziness;syncope or near-syncope  Gastroenterology:      Genitourinary:       Musculoskeletal:       Neurologic:  Negative numbness or tingling of hands;numbness or tingling of feet  Psychiatric:       Heme/Lymph/Imm:       Endocrine:         Physical Exam:  Vitals: /84 (BP Location: Right arm, Patient Position: Sitting, Cuff Size: Adult Large)   Pulse 68   Resp 18   Ht 1.803 m (5' 11\")   Wt 109.3 kg (241 lb)   SpO2 98%   BMI 33.61 kg/m  "     Constitutional:  cooperative, alert and oriented, well developed, well nourished, in no acute distress overweight      Skin:  warm and dry to the touch, no apparent skin lesions or masses noted        Head:  normocephalic, no masses or lesions        Eyes:  pupils equal and round, conjunctivae and lids unremarkable, sclera white, no xanthalasma, EOMS intact, no nystagmus        ENT:  no pallor or cyanosis, dentition good        Neck:  carotid pulses are full and equal bilaterally, JVP normal, no carotid bruit        Chest:  normal breath sounds, clear to auscultation, normal A-P diameter, normal symmetry, normal respiratory excursion, no use of accessory muscles        Cardiac: regular rhythm, normal S1/S2, no S3 or S4, apical impulse not displaced, no murmurs, gallops or rubs                  Abdomen:  abdomen soft;BS normoactive        Vascular: pulses full and equal                                      Extremities and Muscular Skeletal:  no edema           Neurological:  no gross motor deficits        Psych:  affect appropriate, oriented to time, person and place     Recent Lab Results:  LIPID RESULTS:  Lab Results   Component Value Date    CHOL 177 05/08/2024    CHOL 155 09/10/2020    HDL 39 (L) 05/08/2024    HDL 42 09/10/2020     (H) 05/08/2024    LDL 78 09/10/2020    TRIG 159 (H) 05/08/2024    TRIG 176 (H) 09/10/2020       LIVER ENZYME RESULTS:  Lab Results   Component Value Date    AST 36 05/08/2024    AST 27 09/10/2020    ALT 36 05/08/2024    ALT 41 09/10/2020       CBC RESULTS:  Lab Results   Component Value Date    WBC 10.2 05/08/2024    WBC 16.0 (H) 05/19/2020    RBC 5.38 05/08/2024    RBC 4.62 05/19/2020    HGB 16.2 05/08/2024    HGB 14.0 05/19/2020    HCT 47.2 05/08/2024    HCT 42.3 05/19/2020    MCV 88 05/08/2024    MCV 92 05/19/2020    MCH 30.1 05/08/2024    MCH 30.3 05/19/2020    MCHC 34.3 05/08/2024    MCHC 33.1 05/19/2020    RDW 12.6 05/08/2024    RDW 13.1 05/19/2020      05/08/2024     (H) 05/19/2020       BMP RESULTS:  Lab Results   Component Value Date     05/08/2024     09/10/2020    POTASSIUM 4.6 05/08/2024    POTASSIUM 4.1 09/10/2020    CHLORIDE 105 05/08/2024    CHLORIDE 108 09/10/2020    CO2 22 05/08/2024    CO2 28 09/10/2020    ANIONGAP 12 05/08/2024    ANIONGAP 5 09/10/2020     (H) 05/08/2024    GLC 80 09/10/2020    BUN 11.3 05/08/2024    BUN 14 09/10/2020    CR 0.92 05/08/2024    CR 0.82 09/10/2020    GFRESTIMATED 89 05/08/2024    GFRESTIMATED >90 09/10/2020    GFRESTBLACK >90 09/10/2020    RUEL 9.2 05/08/2024    RUEL 8.9 09/10/2020        A1C RESULTS:  Lab Results   Component Value Date    A1C 5.5 04/28/2020       INR RESULTS:  Lab Results   Component Value Date    INR 1.31 (H) 05/04/2020    INR 1.48 (H) 05/04/2020         Tyrone Irving MD, FACC    CC  Sonia Kim MD  No address on file                    Thank you for allowing me to participate in the care of your patient.      Sincerely,     Tyrone Irving MD     [facility]  cc:   Sonia Kim MD  No address on file

## 2025-03-20 ENCOUNTER — DOCUMENTATION ONLY (OUTPATIENT)
Dept: OTHER | Facility: CLINIC | Age: 70
End: 2025-03-20
Payer: COMMERCIAL

## 2025-04-08 ENCOUNTER — PATIENT OUTREACH (OUTPATIENT)
Dept: CARE COORDINATION | Facility: CLINIC | Age: 70
End: 2025-04-08
Payer: COMMERCIAL

## 2025-04-22 ENCOUNTER — PATIENT OUTREACH (OUTPATIENT)
Dept: CARE COORDINATION | Facility: CLINIC | Age: 70
End: 2025-04-22
Payer: COMMERCIAL

## 2025-05-22 DIAGNOSIS — N52.9 ERECTILE DYSFUNCTION, UNSPECIFIED ERECTILE DYSFUNCTION TYPE: Primary | ICD-10-CM

## 2025-05-22 RX ORDER — SILDENAFIL 100 MG/1
100 TABLET, FILM COATED ORAL
Qty: 10 TABLET | Refills: 3 | Status: SHIPPED | OUTPATIENT
Start: 2025-05-22

## 2025-05-22 NOTE — TELEPHONE ENCOUNTER
"Patient has had the Viagra prescribed by PCP 5/8/24. It was discontinued from his med list 10/2/24 with a reason of \"duplicate therapy.\" Pending sig matches previous sig. Please review and approve or deny request.     Marycarmen Catalan, DANNAN, RN    "

## (undated) DEVICE — SU VICRYL 0 CTX 36" J370H

## (undated) DEVICE — SU PROLENE 4-0 SHDA 36" 8521H

## (undated) DEVICE — SUCTION MINISQUAIR SMOKE EVAC CAPTURE DEVICE SQ20012-01

## (undated) DEVICE — LEAD ELEC MYOCARDIO PACING TEMPORARY MEDTRONIC

## (undated) DEVICE — ENDO DISSECTOR BLUNT 10MM CHERRY BCD10

## (undated) DEVICE — TUBING SUCTION 12"X1/4" N612

## (undated) DEVICE — Device

## (undated) DEVICE — CONNECTOR BLAKE DRAIN SGL BCC1

## (undated) DEVICE — ENDO TROCAR FIRST ENTRY KII FIOS Z-THRD 05X100MM CTF03

## (undated) DEVICE — ENDO DISSECTOR BLUNT 05MM  BTD05

## (undated) DEVICE — SOMASENSOR CEREBRAL OXIMETER ADULT SAFB-SM

## (undated) DEVICE — LINEN TOWEL PACK X30 5481

## (undated) DEVICE — SUCTION CATH AIRLIFE TRI-FLO W/CONTROL PORT 14FR  T60C

## (undated) DEVICE — DEFIB PRO-PADZ LVP LQD GEL ADULT 8900-2105-01

## (undated) DEVICE — KIT WASH CELL SAVING ATL2001

## (undated) DEVICE — SOL NACL 0.9% IRRIG 1000ML BOTTLE 2F7124

## (undated) DEVICE — MANIFOLD KIT ANGIO AUTOMATED 014613

## (undated) DEVICE — KIT VASC DILATOR ESTECH 200-120

## (undated) DEVICE — SU ETHIBOND 0 CT-1 CR 8X18" CX21D

## (undated) DEVICE — SU CHORD-X 2-0 L16MM PLEDGETS18MM CXL-20-1812-16

## (undated) DEVICE — TOTE ANGIO CORP PC15AT SAN32CC83O

## (undated) DEVICE — SU DEVICE COR KNOT KIT STD SHORT 2/KIT 030884

## (undated) DEVICE — ADAPTER CARDIOPLG CLAMP 7.5" DLP .25" CON 10005

## (undated) DEVICE — SU ETHIBOND 5 V-37 4X30" MB66G

## (undated) DEVICE — KIT HAND CONTROL ANGIOTOUCH ACIST 65CM AT-P65

## (undated) DEVICE — CATH JACKY 5FR 3.5 CURVE 40-5023

## (undated) DEVICE — SU VICRYL 3-0 FS-1 27" J442H

## (undated) DEVICE — PACK TUBING MINI VAC CUSTOM 1/2X3/8T BB9J78R4

## (undated) DEVICE — DRAIN CHEST TUBE 28FR STR 8028

## (undated) DEVICE — ORGANIZER SUTURE CIRCUMFERENTIAL BELT MI-ISBA-001

## (undated) DEVICE — SU ETHIBOND 3-0 BBDA 36" X588H

## (undated) DEVICE — PROTECTOR ARM ONE-STEP TRENDELENBURG 40418

## (undated) DEVICE — CUP MEDICINE METAL 2OZ STERILE

## (undated) DEVICE — CABLE MYO/LEAD PACING WHITE DISP 019-530

## (undated) DEVICE — GLOVE PROTEXIS W/NEU-THERA 7.5  2D73TE75

## (undated) DEVICE — SU SILK 1 TIE 10X30" SA87G

## (undated) DEVICE — PACK OPEN HEART PV12OH524

## (undated) DEVICE — DRAIN SUMP INTRACARDIAC 20FR 12" POOL TIP 12112

## (undated) DEVICE — SU ETHIBOND 2-0 V-5 EXC 30" PXX82

## (undated) DEVICE — ESU ELEC BLADE 6" COATED/INSULATED E1455-6

## (undated) DEVICE — SLEEVE TR BAND RADIAL COMPRESSION DEVICE 29CM XX-RF06L

## (undated) DEVICE — CANNULA AORTIC ROOT 12GA 11012L

## (undated) DEVICE — TUBING PERFUSION 1/4X1/16X8FT

## (undated) DEVICE — SU PLEDGET SOFT TFE 3/8"X3/26"X1/16" PCP40

## (undated) DEVICE — LINEN TOWEL PACK X5 5464

## (undated) DEVICE — INSERT INTRAC 66MM XT CYGNET N-10174

## (undated) DEVICE — VALVE VRV 9156R2

## (undated) DEVICE — DRAPE MAYO STAND 23X54 8337

## (undated) DEVICE — SU ETHIBOND 2-0 SH-2 DA 30" PXX80

## (undated) DEVICE — INTRO GLIDESHEATH SLENDER 6FR 10X45CM 60-1060

## (undated) DEVICE — DEVICE ASSEMBLY SUCTION/ANTI COAG BTC93

## (undated) DEVICE — DRAPE IOBAN INCISE 23X17" 6650EZ

## (undated) DEVICE — PACK TUBING MINI VAC CUSTOM 3/8X3/8T BB7V94R1

## (undated) DEVICE — RESERVOIR CELL SAVING BLOOD COLLECTION EL2120

## (undated) DEVICE — COVER TABLE POLY 65X90" 8186

## (undated) DEVICE — SU PROLENE 4-0 RB-1DA 36" 8557H

## (undated) DEVICE — PACK MINI VAC CUSTOM CARDOPULMONARY BB5Z97R15

## (undated) DEVICE — SUCTION CANISTER MEDIVAC LINER 3000ML W/LID 65651-530

## (undated) DEVICE — SU VICRYL 2-0 CT-1 27" UND J259H

## (undated) DEVICE — SU PROLENE 5-0 RB-2DA 30" 8710H

## (undated) DEVICE — LEAD PACER MYOCARDIAL BIPOLAR TEMPORARY 53CM 6495F

## (undated) DEVICE — SYR 10ML FINGER CONTROL W/O NDL 309695

## (undated) RX ORDER — GLYCOPYRROLATE 0.2 MG/ML
INJECTION, SOLUTION INTRAMUSCULAR; INTRAVENOUS
Status: DISPENSED
Start: 2020-05-04

## (undated) RX ORDER — CEFAZOLIN SODIUM 2 G/100ML
INJECTION, SOLUTION INTRAVENOUS
Status: DISPENSED
Start: 2020-05-04

## (undated) RX ORDER — HEPARIN SODIUM 1000 [USP'U]/ML
INJECTION, SOLUTION INTRAVENOUS; SUBCUTANEOUS
Status: DISPENSED
Start: 2020-05-04

## (undated) RX ORDER — NALOXONE HYDROCHLORIDE 0.4 MG/ML
INJECTION, SOLUTION INTRAMUSCULAR; INTRAVENOUS; SUBCUTANEOUS
Status: DISPENSED
Start: 2020-02-28

## (undated) RX ORDER — ALBUMIN, HUMAN INJ 5% 5 %
SOLUTION INTRAVENOUS
Status: DISPENSED
Start: 2020-05-04

## (undated) RX ORDER — FENTANYL CITRATE 50 UG/ML
INJECTION, SOLUTION INTRAMUSCULAR; INTRAVENOUS
Status: DISPENSED
Start: 2020-04-01

## (undated) RX ORDER — MUPIROCIN 20 MG/G
OINTMENT TOPICAL
Status: DISPENSED
Start: 2020-05-04

## (undated) RX ORDER — PROPOFOL 10 MG/ML
INJECTION, EMULSION INTRAVENOUS
Status: DISPENSED
Start: 2020-05-04

## (undated) RX ORDER — FENTANYL CITRATE 0.05 MG/ML
INJECTION, SOLUTION INTRAMUSCULAR; INTRAVENOUS
Status: DISPENSED
Start: 2020-05-04

## (undated) RX ORDER — VECURONIUM BROMIDE 1 MG/ML
INJECTION, POWDER, LYOPHILIZED, FOR SOLUTION INTRAVENOUS
Status: DISPENSED
Start: 2020-05-04

## (undated) RX ORDER — NEOSTIGMINE METHYLSULFATE 1 MG/ML
VIAL (ML) INJECTION
Status: DISPENSED
Start: 2020-05-04

## (undated) RX ORDER — LIDOCAINE HYDROCHLORIDE 40 MG/ML
SOLUTION TOPICAL
Status: DISPENSED
Start: 2020-02-28

## (undated) RX ORDER — CEFAZOLIN SODIUM 1 G/3ML
INJECTION, POWDER, FOR SOLUTION INTRAMUSCULAR; INTRAVENOUS
Status: DISPENSED
Start: 2020-05-04

## (undated) RX ORDER — FLUMAZENIL 0.1 MG/ML
INJECTION, SOLUTION INTRAVENOUS
Status: DISPENSED
Start: 2020-02-28

## (undated) RX ORDER — BUPIVACAINE HYDROCHLORIDE 5 MG/ML
INJECTION, SOLUTION EPIDURAL; INTRACAUDAL
Status: DISPENSED
Start: 2020-05-04

## (undated) RX ORDER — FAMOTIDINE 20 MG/1
TABLET, FILM COATED ORAL
Status: DISPENSED
Start: 2020-05-04

## (undated) RX ORDER — HEPARIN SODIUM 200 [USP'U]/100ML
INJECTION, SOLUTION INTRAVENOUS
Status: DISPENSED
Start: 2020-03-16

## (undated) RX ORDER — LIDOCAINE HYDROCHLORIDE 20 MG/ML
INJECTION, SOLUTION EPIDURAL; INFILTRATION; INTRACAUDAL; PERINEURAL
Status: DISPENSED
Start: 2020-05-04

## (undated) RX ORDER — HEPARIN SODIUM 1000 [USP'U]/ML
INJECTION, SOLUTION INTRAVENOUS; SUBCUTANEOUS
Status: DISPENSED
Start: 2020-03-16

## (undated) RX ORDER — VERAPAMIL HYDROCHLORIDE 2.5 MG/ML
INJECTION, SOLUTION INTRAVENOUS
Status: DISPENSED
Start: 2020-03-16

## (undated) RX ORDER — FENTANYL CITRATE 50 UG/ML
INJECTION, SOLUTION INTRAMUSCULAR; INTRAVENOUS
Status: DISPENSED
Start: 2020-03-16

## (undated) RX ORDER — NITROGLYCERIN 5 MG/ML
VIAL (ML) INTRAVENOUS
Status: DISPENSED
Start: 2020-03-16

## (undated) RX ORDER — LIDOCAINE HYDROCHLORIDE 10 MG/ML
INJECTION, SOLUTION EPIDURAL; INFILTRATION; INTRACAUDAL; PERINEURAL
Status: DISPENSED
Start: 2020-03-16

## (undated) RX ORDER — GLYCOPYRROLATE 0.2 MG/ML
INJECTION, SOLUTION INTRAMUSCULAR; INTRAVENOUS
Status: DISPENSED
Start: 2020-02-28

## (undated) RX ORDER — METOPROLOL TARTRATE 25 MG/1
TABLET, FILM COATED ORAL
Status: DISPENSED
Start: 2020-05-04

## (undated) RX ORDER — PROTAMINE SULFATE 10 MG/ML
INJECTION, SOLUTION INTRAVENOUS
Status: DISPENSED
Start: 2020-05-04

## (undated) RX ORDER — FENTANYL CITRATE 50 UG/ML
INJECTION, SOLUTION INTRAMUSCULAR; INTRAVENOUS
Status: DISPENSED
Start: 2020-02-28

## (undated) RX ORDER — FENTANYL CITRATE 50 UG/ML
INJECTION, SOLUTION INTRAMUSCULAR; INTRAVENOUS
Status: DISPENSED
Start: 2020-05-04